# Patient Record
Sex: FEMALE | Race: WHITE | Employment: OTHER | URBAN - METROPOLITAN AREA
[De-identification: names, ages, dates, MRNs, and addresses within clinical notes are randomized per-mention and may not be internally consistent; named-entity substitution may affect disease eponyms.]

---

## 2018-05-30 ENCOUNTER — OFFICE VISIT (OUTPATIENT)
Dept: SURGERY | Facility: CLINIC | Age: 73
End: 2018-05-30

## 2018-05-30 VITALS
BODY MASS INDEX: 21.34 KG/M2 | HEIGHT: 64.25 IN | WEIGHT: 125 LBS | DIASTOLIC BLOOD PRESSURE: 83 MMHG | TEMPERATURE: 99 F | HEART RATE: 80 BPM | SYSTOLIC BLOOD PRESSURE: 156 MMHG

## 2018-05-30 DIAGNOSIS — K40.90 RIGHT INGUINAL HERNIA: Primary | ICD-10-CM

## 2018-05-30 PROCEDURE — 99204 OFFICE O/P NEW MOD 45 MIN: CPT | Performed by: SURGERY

## 2018-05-30 RX ORDER — CHLORAL HYDRATE 500 MG
1000 CAPSULE ORAL
COMMUNITY

## 2018-05-30 NOTE — PROGRESS NOTES
Follow Up Visit Note       Active Problems      No diagnosis found. Chief Complaint   No chief complaint on file.   Patient presents as consultation from Dr. Jairo Magallanes      History of Present Illness  Patient presents with right inguinal hernia which is bee bleeding, blood in stool, constipation, diarrhea, nausea and vomiting. Genitourinary: Negative for difficulty urinating, dysuria, frequency and urgency. Musculoskeletal: Negative for arthralgias and myalgias. Skin: Negative for color change and rash.

## 2018-07-25 ENCOUNTER — OFFICE VISIT (OUTPATIENT)
Dept: SURGERY | Facility: CLINIC | Age: 73
End: 2018-07-25
Payer: MEDICARE

## 2018-07-25 VITALS
SYSTOLIC BLOOD PRESSURE: 135 MMHG | HEART RATE: 97 BPM | BODY MASS INDEX: 19.16 KG/M2 | WEIGHT: 115 LBS | DIASTOLIC BLOOD PRESSURE: 81 MMHG | HEIGHT: 65 IN

## 2018-07-25 DIAGNOSIS — C19 COLORECTAL CANCER, STAGE IV (HCC): Primary | ICD-10-CM

## 2018-07-25 PROCEDURE — 99214 OFFICE O/P EST MOD 30 MIN: CPT | Performed by: SURGERY

## 2018-07-25 NOTE — PROGRESS NOTES
Follow Up Visit Note       Active Problems      No diagnosis found. Chief Complaint   Patient presents with:  Cancer: Estblished patient, new diagnosis, Colon cancer.  brought paperwork. 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 Disp:  Rfl:    omega-3 fatty acids 1000 MG Oral Cap by Does not apply route. Disp:  Rfl:         Review of Systems  The Review of Systems has been reviewed by me during today.   Review of Systems   Constitutional: Negat Normal skin turgor. No dominant lesions noted. Lymphatic. No cervical, supraclavicular, or inguinal lymphadenopathy.               Assessment   Colorectal adenocarcinoma stage IV with metastasis to the liver no obstructive symptoms other than frequent b

## 2018-07-31 ENCOUNTER — OFFICE VISIT (OUTPATIENT)
Dept: HEMATOLOGY/ONCOLOGY | Facility: HOSPITAL | Age: 73
End: 2018-07-31
Attending: INTERNAL MEDICINE
Payer: MEDICARE

## 2018-07-31 VITALS
HEIGHT: 65 IN | TEMPERATURE: 99 F | WEIGHT: 111 LBS | RESPIRATION RATE: 18 BRPM | BODY MASS INDEX: 18.49 KG/M2 | OXYGEN SATURATION: 100 % | HEART RATE: 98 BPM | DIASTOLIC BLOOD PRESSURE: 73 MMHG | SYSTOLIC BLOOD PRESSURE: 120 MMHG

## 2018-07-31 DIAGNOSIS — Z19.2 MALIGNANT NEOPLASM WITH HORMONE RESISTANT STATUS: ICD-10-CM

## 2018-07-31 DIAGNOSIS — C20 RECTAL CANCER (HCC): Primary | ICD-10-CM

## 2018-07-31 DIAGNOSIS — C78.7 SECONDARY CARCINOMA OF LIVER (HCC): ICD-10-CM

## 2018-07-31 PROBLEM — D50.9 IRON DEFICIENCY ANEMIA: Status: ACTIVE | Noted: 2018-07-31

## 2018-07-31 LAB
ALBUMIN SERPL-MCNC: 2.2 G/DL (ref 3.5–4.8)
ALBUMIN/GLOB SERPL: 0.4 {RATIO} (ref 1–2)
ALP LIVER SERPL-CCNC: 368 U/L (ref 55–142)
ALT SERPL-CCNC: 33 U/L (ref 14–54)
ANION GAP SERPL CALC-SCNC: 9 MMOL/L (ref 0–18)
AST SERPL-CCNC: 67 U/L (ref 15–41)
BASOPHILS # BLD AUTO: 0.03 X10(3) UL (ref 0–0.1)
BASOPHILS NFR BLD AUTO: 0.3 %
BILIRUB SERPL-MCNC: 0.2 MG/DL (ref 0.1–2)
BUN BLD-MCNC: 14 MG/DL (ref 8–20)
BUN/CREAT SERPL: 25 (ref 10–20)
CALCIUM BLD-MCNC: 8.9 MG/DL (ref 8.3–10.3)
CEA SERPL-MCNC: 85.5 NG/ML (ref 0.5–5)
CHLORIDE SERPL-SCNC: 98 MMOL/L (ref 101–111)
CO2 SERPL-SCNC: 28 MMOL/L (ref 22–32)
CREAT BLD-MCNC: 0.56 MG/DL (ref 0.55–1.02)
EOSINOPHIL # BLD AUTO: 0.11 X10(3) UL (ref 0–0.3)
EOSINOPHIL NFR BLD AUTO: 1.2 %
ERYTHROCYTE [DISTWIDTH] IN BLOOD BY AUTOMATED COUNT: 23.2 % (ref 11.5–16)
GLOBULIN PLAS-MCNC: 5.9 G/DL (ref 2.5–3.7)
GLUCOSE BLD-MCNC: 95 MG/DL (ref 70–99)
HCT VFR BLD AUTO: 29.9 % (ref 34–50)
HGB BLD-MCNC: 8.7 G/DL (ref 12–16)
IMMATURE GRANULOCYTE COUNT: 0.03 X10(3) UL (ref 0–1)
IMMATURE GRANULOCYTE RATIO %: 0.3 %
INR BLD: 1.05 (ref 0.9–1.1)
LARGE PLATELETS: PRESENT
LYMPHOCYTES # BLD AUTO: 1.11 X10(3) UL (ref 0.9–4)
LYMPHOCYTES NFR BLD AUTO: 11.8 %
M PROTEIN MFR SERPL ELPH: 8.1 G/DL (ref 6.1–8.3)
MCH RBC QN AUTO: 20.3 PG (ref 27–33.2)
MCHC RBC AUTO-ENTMCNC: 29.1 G/DL (ref 31–37)
MCV RBC AUTO: 69.7 FL (ref 81–100)
MONOCYTES # BLD AUTO: 1 X10(3) UL (ref 0.1–1)
MONOCYTES NFR BLD AUTO: 10.6 %
NEUTROPHIL ABS PRELIM: 7.11 X10 (3) UL (ref 1.3–6.7)
NEUTROPHILS # BLD AUTO: 7.11 X10(3) UL (ref 1.3–6.7)
NEUTROPHILS NFR BLD AUTO: 75.8 %
OSMOLALITY SERPL CALC.SUM OF ELEC: 280 MOSM/KG (ref 275–295)
PLATELET # BLD AUTO: 562 10(3)UL (ref 150–450)
POTASSIUM SERPL-SCNC: 4.3 MMOL/L (ref 3.6–5.1)
PSA SERPL DL<=0.01 NG/ML-MCNC: 14.1 SECONDS (ref 12.4–14.7)
RBC # BLD AUTO: 4.29 X10(6)UL (ref 3.8–5.1)
RED CELL DISTRIBUTION WIDTH-SD: 54.5 FL (ref 35.1–46.3)
SODIUM SERPL-SCNC: 135 MMOL/L (ref 136–144)
WBC # BLD AUTO: 9.4 X10(3) UL (ref 4–13)

## 2018-07-31 PROCEDURE — 99205 OFFICE O/P NEW HI 60 MIN: CPT | Performed by: INTERNAL MEDICINE

## 2018-07-31 NOTE — PROGRESS NOTES
Patient is here for MD consult for colon cancer. Patient was vacationing in [place] with family. Originally from there. Saw a family physician to discuss frequent stools after eating. She was sent for labs, an ultrasound and Ct scan.  Patient was admitted t

## 2018-08-01 ENCOUNTER — TELEPHONE (OUTPATIENT)
Dept: HEMATOLOGY/ONCOLOGY | Facility: HOSPITAL | Age: 73
End: 2018-08-01

## 2018-08-01 PROBLEM — C78.7 SECONDARY CARCINOMA OF LIVER (HCC): Status: ACTIVE | Noted: 2018-08-01

## 2018-08-01 NOTE — PROGRESS NOTES
Patient scheduled for chemo education in [facility] on 8/7 @ 1 pm, FOLFOX/Avastin 8/9 @ 10 am in [facility]. IR called for port placement to be scheduled the week of chemo. Patient will get a dose of Feraheme tomorrow in [facility].

## 2018-08-01 NOTE — TELEPHONE ENCOUNTER
Patient was informed that hgb was 8.7 and is better and to continue iron infusion per MD Hernandez.  Patient verbalizes understanding

## 2018-08-02 ENCOUNTER — OFFICE VISIT (OUTPATIENT)
Dept: HEMATOLOGY/ONCOLOGY | Age: 73
End: 2018-08-02
Attending: INTERNAL MEDICINE
Payer: MEDICARE

## 2018-08-02 VITALS
RESPIRATION RATE: 16 BRPM | HEART RATE: 93 BPM | SYSTOLIC BLOOD PRESSURE: 121 MMHG | TEMPERATURE: 101 F | OXYGEN SATURATION: 98 % | DIASTOLIC BLOOD PRESSURE: 68 MMHG

## 2018-08-02 DIAGNOSIS — D50.9 IRON DEFICIENCY ANEMIA, UNSPECIFIED IRON DEFICIENCY ANEMIA TYPE: Primary | ICD-10-CM

## 2018-08-02 DIAGNOSIS — C20 RECTAL CANCER (HCC): ICD-10-CM

## 2018-08-02 PROCEDURE — 96376 TX/PRO/DX INJ SAME DRUG ADON: CPT

## 2018-08-02 PROCEDURE — 96365 THER/PROPH/DIAG IV INF INIT: CPT

## 2018-08-02 PROCEDURE — 96375 TX/PRO/DX INJ NEW DRUG ADDON: CPT

## 2018-08-02 RX ORDER — METHYLPREDNISOLONE SODIUM SUCCINATE 125 MG/2ML
40 INJECTION, POWDER, LYOPHILIZED, FOR SOLUTION INTRAMUSCULAR; INTRAVENOUS ONCE
Status: COMPLETED | OUTPATIENT
Start: 2018-08-02 | End: 2018-08-02

## 2018-08-02 RX ADMIN — METHYLPREDNISOLONE SODIUM SUCCINATE 40 MG: 125 INJECTION, POWDER, LYOPHILIZED, FOR SOLUTION INTRAMUSCULAR; INTRAVENOUS at 13:58:00

## 2018-08-02 NOTE — PATIENT INSTRUCTIONS
Ferumoxytol injection  Brand Name: Shady Magallanes  What is this medicine? FERUMOXYTOL is an iron complex. Iron is used to make healthy red blood cells, which carry oxygen and nutrients throughout the body.  This medicine is used to treat iron deficiency anemia What should I watch for while using this medicine? Visit your doctor or healthcare professional regularly. Tell your doctor or healthcare professional if your symptoms do not start to get better or if they get worse.  You may need blood work done while you

## 2018-08-02 NOTE — PROGRESS NOTES
After receiving about 45ml of 120ml dose of faraheme, patient felt stabbing pain to back. She felt short of breath and like she was going to faint. Faraheme stopped. Placed on 2 liters of oxygen and began infusing normal saline.  Gloria Armendariz APN in to treatme

## 2018-08-02 NOTE — CONSULTS
[facility]    PATIENT'S NAME: Erik Gold   CONSULTING PHYSICIAN: Kavya Ryan M.D.    PATIENT ACCOUNT #: [de-identified] LOCATION: [address] [place] [address] RECORD #: [id number] YOB: 1945   CONSULTATION DATE: 07/31/2018       CANCER CE thickening of the distal rectum but they could not define the presence of the mass. Her CT of the chest showed no clearcut evidence of lung metastases. She had labs drawn there, which included a high CRP and a hemoglobin was 7.9.   She received an iron in glasses. She has no cough, asthma. She has mild allergies. She has no symptoms of GERD. She denies any urinary tract infections now; she has had them in the past.  She denies any renal calculi. She has no thyroid or endocrine disorders.   She has no ar condition. I explained that in general, unless the person has an isolated metastasis, we do not consider stage IV disease as curable and our primary goal of treatment should be extension of life and maintenance of quality of life.   At present, she does no choice of therapy in the meantime. Her questions were answered. An hour was spent with the patient and her daughter reviewing the above.     Dictated By Nida Avila M.D.  d: 08/02/2018 07:07:48  t: 08/02/2018 07:34:42  Job [id number]  NB/

## 2018-08-03 ENCOUNTER — DIETICIAN VISIT (OUTPATIENT)
Dept: HEMATOLOGY/ONCOLOGY | Facility: HOSPITAL | Age: 73
End: 2018-08-03

## 2018-08-03 ENCOUNTER — MED REC SCAN ONLY (OUTPATIENT)
Dept: HEMATOLOGY/ONCOLOGY | Facility: HOSPITAL | Age: 73
End: 2018-08-03

## 2018-08-07 ENCOUNTER — OFFICE VISIT (OUTPATIENT)
Dept: HEMATOLOGY/ONCOLOGY | Age: 73
End: 2018-08-07
Attending: INTERNAL MEDICINE
Payer: MEDICARE

## 2018-08-07 DIAGNOSIS — Z71.9 ENCOUNTER FOR HEALTH EDUCATION: ICD-10-CM

## 2018-08-07 DIAGNOSIS — C20 RECTAL CANCER (HCC): Primary | ICD-10-CM

## 2018-08-07 DIAGNOSIS — C78.7 SECONDARY CARCINOMA OF LIVER (HCC): ICD-10-CM

## 2018-08-07 PROCEDURE — 99215 OFFICE O/P EST HI 40 MIN: CPT | Performed by: CLINICAL NURSE SPECIALIST

## 2018-08-07 NOTE — PROGRESS NOTES
Chemotherapy Education    Learner:  Patient and boyfriend     Barriers / Limitations:  None    Chemotherapy education goals:  · Learn the drug names  · Administration schedule  · Routes of administration  · Treatment setting    Drug names:  Fluorouracil, L irregularity and vaginal dryness:  Achieved    Notify MD/RN of any changes or problems:  Achieved    Comments:    Treatment Effects on Emotional Status:    Potential mood changes, depression, nervousness, difficulty sleeping:  Achieved    Importance of sup

## 2018-08-08 ENCOUNTER — HOSPITAL ENCOUNTER (OUTPATIENT)
Dept: INTERVENTIONAL RADIOLOGY/VASCULAR | Facility: HOSPITAL | Age: 73
Discharge: HOME | End: 2018-08-08
Attending: INTERNAL MEDICINE | Admitting: INTERNAL MEDICINE
Payer: MEDICARE

## 2018-08-08 VITALS
SYSTOLIC BLOOD PRESSURE: 122 MMHG | HEART RATE: 81 BPM | DIASTOLIC BLOOD PRESSURE: 68 MMHG | OXYGEN SATURATION: 97 % | RESPIRATION RATE: 22 BRPM | TEMPERATURE: 98 F

## 2018-08-08 DIAGNOSIS — C20 RECTAL CANCER (HCC): ICD-10-CM

## 2018-08-08 LAB — INR: 1 (ref 0.8–1.3)

## 2018-08-08 PROCEDURE — 99152 MOD SED SAME PHYS/QHP 5/>YRS: CPT

## 2018-08-08 PROCEDURE — 0JH60WZ INSERTION OF TOTALLY IMPLANTABLE VASCULAR ACCESS DEVICE INTO CHEST SUBCUTANEOUS TISSUE AND FASCIA, OPEN APPROACH: ICD-10-PCS | Performed by: RADIOLOGY

## 2018-08-08 PROCEDURE — 02HV33Z INSERTION OF INFUSION DEVICE INTO SUPERIOR VENA CAVA, PERCUTANEOUS APPROACH: ICD-10-PCS | Performed by: RADIOLOGY

## 2018-08-08 PROCEDURE — 76937 US GUIDE VASCULAR ACCESS: CPT

## 2018-08-08 PROCEDURE — 77001 FLUOROGUIDE FOR VEIN DEVICE: CPT

## 2018-08-08 PROCEDURE — 85610 PROTHROMBIN TIME: CPT

## 2018-08-08 PROCEDURE — 36561 INSERT TUNNELED CV CATH: CPT

## 2018-08-08 RX ORDER — BACITRACIN 50000 [USP'U]/1
INJECTION, POWDER, LYOPHILIZED, FOR SOLUTION INTRAMUSCULAR
Status: COMPLETED
Start: 2018-08-08 | End: 2018-08-08

## 2018-08-08 RX ORDER — HEPARIN SODIUM 5000 [USP'U]/ML
INJECTION, SOLUTION INTRAVENOUS; SUBCUTANEOUS
Status: COMPLETED
Start: 2018-08-08 | End: 2018-08-08

## 2018-08-08 RX ORDER — LIDOCAINE HYDROCHLORIDE 10 MG/ML
INJECTION, SOLUTION INFILTRATION; PERINEURAL
Status: COMPLETED
Start: 2018-08-08 | End: 2018-08-08

## 2018-08-08 RX ORDER — SODIUM CHLORIDE 9 MG/ML
INJECTION, SOLUTION INTRAVENOUS CONTINUOUS
Status: DISCONTINUED | OUTPATIENT
Start: 2018-08-08 | End: 2018-08-08

## 2018-08-08 RX ORDER — PROCHLORPERAZINE MALEATE 10 MG
10 TABLET ORAL EVERY 6 HOURS PRN
Qty: 30 TABLET | Refills: 3 | Status: SHIPPED | OUTPATIENT
Start: 2018-08-08 | End: 2019-02-28

## 2018-08-08 RX ORDER — MIDAZOLAM HYDROCHLORIDE 1 MG/ML
INJECTION INTRAMUSCULAR; INTRAVENOUS
Status: COMPLETED
Start: 2018-08-08 | End: 2018-08-08

## 2018-08-08 RX ORDER — ONDANSETRON HYDROCHLORIDE 8 MG/1
8 TABLET, FILM COATED ORAL EVERY 8 HOURS PRN
Qty: 30 TABLET | Refills: 3 | Status: SHIPPED | OUTPATIENT
Start: 2018-08-08 | End: 2019-02-28

## 2018-08-08 RX ORDER — CEFAZOLIN SODIUM 1 G/3ML
INJECTION, POWDER, FOR SOLUTION INTRAMUSCULAR; INTRAVENOUS
Status: COMPLETED
Start: 2018-08-08 | End: 2018-08-08

## 2018-08-08 RX ORDER — LIDOCAINE HYDROCHLORIDE AND EPINEPHRINE 15; 5 MG/ML; UG/ML
INJECTION, SOLUTION EPIDURAL
Status: COMPLETED
Start: 2018-08-08 | End: 2018-08-08

## 2018-08-08 RX ADMIN — SODIUM CHLORIDE: 9 INJECTION, SOLUTION INTRAVENOUS at 10:30:00

## 2018-08-08 NOTE — PROGRESS NOTES
Rc'd pt from cath lab in stable condition. VSS. Dressings to right neck are soft, clean and dry. No bleeding or hematoma. Pt denies c/o pain or discomfort. Pt sleepy, easily arousable. 14:30: Pt fully awake.  Reviewed d/c instructions with pt and daught

## 2018-08-09 ENCOUNTER — OFFICE VISIT (OUTPATIENT)
Dept: HEMATOLOGY/ONCOLOGY | Age: 73
End: 2018-08-09
Attending: INTERNAL MEDICINE
Payer: MEDICARE

## 2018-08-09 ENCOUNTER — NURSE ONLY (OUTPATIENT)
Dept: HEMATOLOGY/ONCOLOGY | Age: 73
End: 2018-08-09
Attending: INTERNAL MEDICINE
Payer: MEDICARE

## 2018-08-09 VITALS
OXYGEN SATURATION: 98 % | HEART RATE: 61 BPM | BODY MASS INDEX: 18 KG/M2 | RESPIRATION RATE: 16 BRPM | WEIGHT: 111 LBS | SYSTOLIC BLOOD PRESSURE: 124 MMHG | DIASTOLIC BLOOD PRESSURE: 72 MMHG | TEMPERATURE: 99 F

## 2018-08-09 DIAGNOSIS — C20 RECTAL CANCER (HCC): ICD-10-CM

## 2018-08-09 DIAGNOSIS — C78.7 SECONDARY CARCINOMA OF LIVER (HCC): Primary | ICD-10-CM

## 2018-08-09 LAB
ALBUMIN SERPL-MCNC: 2 G/DL (ref 3.5–4.8)
ALBUMIN/GLOB SERPL: 0.4 {RATIO} (ref 1–2)
ALP LIVER SERPL-CCNC: 318 U/L (ref 55–142)
ALT SERPL-CCNC: 30 U/L (ref 14–54)
ANION GAP SERPL CALC-SCNC: 10 MMOL/L (ref 0–18)
AST SERPL-CCNC: 82 U/L (ref 15–41)
BASOPHILS # BLD AUTO: 0.03 X10(3) UL (ref 0–0.1)
BASOPHILS NFR BLD AUTO: 0.3 %
BILIRUB SERPL-MCNC: 0.5 MG/DL (ref 0.1–2)
BUN BLD-MCNC: 14 MG/DL (ref 8–20)
BUN/CREAT SERPL: 18.2 (ref 10–20)
CALCIUM BLD-MCNC: 8.7 MG/DL (ref 8.3–10.3)
CEA SERPL-MCNC: 86.5 NG/ML (ref 0.5–5)
CHLORIDE SERPL-SCNC: 98 MMOL/L (ref 101–111)
CO2 SERPL-SCNC: 24 MMOL/L (ref 22–32)
CREAT BLD-MCNC: 0.77 MG/DL (ref 0.55–1.02)
EOSINOPHIL # BLD AUTO: 0.05 X10(3) UL (ref 0–0.3)
EOSINOPHIL NFR BLD AUTO: 0.4 %
ERYTHROCYTE [DISTWIDTH] IN BLOOD BY AUTOMATED COUNT: 25.1 % (ref 11.5–16)
GLOBULIN PLAS-MCNC: 5.6 G/DL (ref 2.5–3.7)
GLUCOSE BLD-MCNC: 185 MG/DL (ref 70–99)
HCT VFR BLD AUTO: 30.5 % (ref 34–50)
HGB BLD-MCNC: 8.9 G/DL (ref 12–16)
IMMATURE GRANULOCYTE COUNT: 0.04 X10(3) UL (ref 0–1)
IMMATURE GRANULOCYTE RATIO %: 0.4 %
LYMPHOCYTES # BLD AUTO: 0.91 X10(3) UL (ref 0.9–4)
LYMPHOCYTES NFR BLD AUTO: 8.1 %
M PROTEIN MFR SERPL ELPH: 7.6 G/DL (ref 6.1–8.3)
MCH RBC QN AUTO: 20.7 PG (ref 27–33.2)
MCHC RBC AUTO-ENTMCNC: 29.2 G/DL (ref 31–37)
MCV RBC AUTO: 71.1 FL (ref 81–100)
MONOCYTES # BLD AUTO: 1.17 X10(3) UL (ref 0.1–1)
MONOCYTES NFR BLD AUTO: 10.4 %
NEUTROPHIL ABS PRELIM: 9.02 X10 (3) UL (ref 1.3–6.7)
NEUTROPHILS # BLD AUTO: 9.02 X10(3) UL (ref 1.3–6.7)
NEUTROPHILS NFR BLD AUTO: 80.4 %
OSMOLALITY SERPL CALC.SUM OF ELEC: 279 MOSM/KG (ref 275–295)
PLATELET # BLD AUTO: 401 10(3)UL (ref 150–450)
PLATELET MORPHOLOGY: NORMAL
POTASSIUM SERPL-SCNC: 3.7 MMOL/L (ref 3.6–5.1)
RBC # BLD AUTO: 4.29 X10(6)UL (ref 3.8–5.1)
RED CELL DISTRIBUTION WIDTH-SD: 62.4 FL (ref 35.1–46.3)
ROULEAUX: PRESENT
SODIUM SERPL-SCNC: 132 MMOL/L (ref 136–144)
WBC # BLD AUTO: 11.2 X10(3) UL (ref 4–13)

## 2018-08-09 PROCEDURE — 96411 CHEMO IV PUSH ADDL DRUG: CPT

## 2018-08-09 PROCEDURE — 96413 CHEMO IV INFUSION 1 HR: CPT

## 2018-08-09 PROCEDURE — 96417 CHEMO IV INFUS EACH ADDL SEQ: CPT

## 2018-08-09 PROCEDURE — 96368 THER/DIAG CONCURRENT INF: CPT

## 2018-08-09 PROCEDURE — 99214 OFFICE O/P EST MOD 30 MIN: CPT | Performed by: INTERNAL MEDICINE

## 2018-08-09 PROCEDURE — 85025 COMPLETE CBC W/AUTO DIFF WBC: CPT

## 2018-08-09 PROCEDURE — 96416 CHEMO PROLONG INFUSE W/PUMP: CPT

## 2018-08-09 PROCEDURE — 82378 CARCINOEMBRYONIC ANTIGEN: CPT

## 2018-08-09 PROCEDURE — 80053 COMPREHEN METABOLIC PANEL: CPT

## 2018-08-09 PROCEDURE — 96415 CHEMO IV INFUSION ADDL HR: CPT

## 2018-08-09 PROCEDURE — 96375 TX/PRO/DX INJ NEW DRUG ADDON: CPT

## 2018-08-09 RX ORDER — MULTIVIT-MIN/IRON FUM/FOLIC AC 7.5 MG-4
1 TABLET ORAL DAILY
COMMUNITY
End: 2019-01-01

## 2018-08-09 RX ORDER — METOCLOPRAMIDE HYDROCHLORIDE 5 MG/ML
10 INJECTION INTRAMUSCULAR; INTRAVENOUS EVERY 6 HOURS PRN
Status: CANCELLED | OUTPATIENT
Start: 2018-08-09

## 2018-08-09 RX ORDER — LORAZEPAM 2 MG/ML
INJECTION INTRAMUSCULAR EVERY 4 HOURS PRN
Status: CANCELLED | OUTPATIENT
Start: 2018-08-09

## 2018-08-09 RX ORDER — FLUOROURACIL 50 MG/ML
2400 INJECTION, SOLUTION INTRAVENOUS CONTINUOUS
Status: CANCELLED | OUTPATIENT
Start: 2018-08-09

## 2018-08-09 RX ORDER — PROCHLORPERAZINE MALEATE 10 MG
10 TABLET ORAL EVERY 6 HOURS PRN
Status: CANCELLED | OUTPATIENT
Start: 2018-08-09

## 2018-08-09 RX ORDER — FLUOROURACIL 50 MG/ML
400 INJECTION, SOLUTION INTRAVENOUS ONCE
Status: COMPLETED | OUTPATIENT
Start: 2018-08-09 | End: 2018-08-09

## 2018-08-09 RX ORDER — FLUOROURACIL 50 MG/ML
2400 INJECTION, SOLUTION INTRAVENOUS CONTINUOUS
Status: DISCONTINUED | OUTPATIENT
Start: 2018-08-09 | End: 2018-08-09

## 2018-08-09 RX ORDER — LORAZEPAM 0.5 MG/1
TABLET ORAL EVERY 4 HOURS PRN
Status: CANCELLED | OUTPATIENT
Start: 2018-08-09

## 2018-08-09 RX ORDER — ONDANSETRON 2 MG/ML
8 INJECTION INTRAMUSCULAR; INTRAVENOUS EVERY 6 HOURS PRN
Status: CANCELLED | OUTPATIENT
Start: 2018-08-09

## 2018-08-09 RX ORDER — FLUOROURACIL 50 MG/ML
400 INJECTION, SOLUTION INTRAVENOUS ONCE
Status: CANCELLED | OUTPATIENT
Start: 2018-08-09

## 2018-08-09 RX ADMIN — FLUOROURACIL 3700 MG: 50 INJECTION, SOLUTION INTRAVENOUS at 15:18:00

## 2018-08-09 RX ADMIN — FLUOROURACIL 600 MG: 50 INJECTION, SOLUTION INTRAVENOUS at 15:12:00

## 2018-08-09 NOTE — PATIENT INSTRUCTIONS
To reach Dr Teresa Ferreira nurse during business hours, please call [phone]. After hours, including weekends, evenings, and holidays, please call the main number [phone] for emergent needs.

## 2018-08-09 NOTE — PROGRESS NOTES
Nutrition Consultation    Patient Name: Johnathan Lima  YOB: 1945  Medical Record Number: [id number]   Account Number: [de-identified]  Dietitian: Tarah Youssef RD    Date of visit: 8/9/2018    Diet Rx: high protein, calorie as tolerated    P Britton who was nominated for [employer]" for her work in oncology; this particular physician touts the benefits of a vegetarian diet w/ additional protein-like substance.  Pt states she's trying to follow for \"6 weeks\" to see if her testing im

## 2018-08-10 ENCOUNTER — TELEPHONE (OUTPATIENT)
Dept: HEMATOLOGY/ONCOLOGY | Facility: HOSPITAL | Age: 73
End: 2018-08-10

## 2018-08-10 NOTE — PROGRESS NOTES
[facility]    PATIENT'S NAME: Lizette Bull   ATTENDING PHYSICIAN: Effie Blanton M.D.    PATIENT ACCOUNT #: [de-identified] LOCATION: [address] [place] [address] RECORD #: [id number] YOB: 1945   DATE OF SERVICE: 08/09/2018       CANCER TAHIR when we jovanni it previously. She has been able to maintain her weight. She states that she is having a little bit of discomfort in her lower abdomen occasionally. She is eating a relatively high-fiber diet. She has no fevers or chills.   She does have a stop.  I cautioned her against taking too many extra supplements while on chemotherapy due to uncertain interaction between these agents and the chemotherapy. She is unlikely to experience major hair loss. I will see her again in 2 weeks.   She knows to c

## 2018-08-11 ENCOUNTER — NURSE ONLY (OUTPATIENT)
Dept: HEMATOLOGY/ONCOLOGY | Facility: HOSPITAL | Age: 73
End: 2018-08-11
Attending: INTERNAL MEDICINE
Payer: MEDICARE

## 2018-08-11 PROCEDURE — 96523 IRRIG DRUG DELIVERY DEVICE: CPT

## 2018-08-13 ENCOUNTER — TELEPHONE (OUTPATIENT)
Dept: HEMATOLOGY/ONCOLOGY | Facility: HOSPITAL | Age: 73
End: 2018-08-13

## 2018-08-13 NOTE — TELEPHONE ENCOUNTER
Date of Treatment: 8/9/18                               Type of Chemo: FOLFOX/AVASTIN    Comments: LM for patient on listed cell number. Recommendations: general message for patient to call with any questions or concerns after treatment.  Verified next a

## 2018-08-15 NOTE — PROGRESS NOTES
[facility]  Progress Note      Gianfranco Dean Patient Status:  Outpatient in a Bed    1945 MRN [id number]   [facility] No att[facility] providers found   [facility] Day # 0 PCP Roann Schirmer     Patient came to I

## 2018-08-16 ENCOUNTER — OFFICE VISIT (OUTPATIENT)
Dept: HEMATOLOGY/ONCOLOGY | Age: 73
End: 2018-08-16
Attending: CLINICAL NURSE SPECIALIST
Payer: MEDICARE

## 2018-08-16 VITALS
DIASTOLIC BLOOD PRESSURE: 78 MMHG | BODY MASS INDEX: 19 KG/M2 | SYSTOLIC BLOOD PRESSURE: 122 MMHG | HEART RATE: 93 BPM | OXYGEN SATURATION: 100 % | TEMPERATURE: 96 F | WEIGHT: 108 LBS | RESPIRATION RATE: 16 BRPM

## 2018-08-16 DIAGNOSIS — C20 RECTAL CANCER (HCC): ICD-10-CM

## 2018-08-16 DIAGNOSIS — D50.9 IRON DEFICIENCY ANEMIA, UNSPECIFIED IRON DEFICIENCY ANEMIA TYPE: ICD-10-CM

## 2018-08-16 DIAGNOSIS — Z51.11 ENCOUNTER FOR CHEMOTHERAPY MANAGEMENT: ICD-10-CM

## 2018-08-16 DIAGNOSIS — K13.79 MOUTH SORE SECONDARY TO CHEMOTHERAPY: ICD-10-CM

## 2018-08-16 DIAGNOSIS — C78.7 SECONDARY CARCINOMA OF LIVER (HCC): Primary | ICD-10-CM

## 2018-08-16 DIAGNOSIS — T45.1X5A MOUTH SORE SECONDARY TO CHEMOTHERAPY: ICD-10-CM

## 2018-08-16 DIAGNOSIS — R53.0 NEOPLASTIC MALIGNANT RELATED FATIGUE: ICD-10-CM

## 2018-08-16 DIAGNOSIS — R63.4 WEIGHT LOSS: ICD-10-CM

## 2018-08-16 PROCEDURE — 99214 OFFICE O/P EST MOD 30 MIN: CPT | Performed by: CLINICAL NURSE SPECIALIST

## 2018-08-16 NOTE — PROGRESS NOTES
ANP Visit Note    Patient Name: Roberto Minaya   YOB: 1945   Medical Record Number: [id number]   CSN: [id number]   Date of visit: 8/16/2018       Chief Complaint/Reason for Visit:  Metastatic Rectal Cancer, Cycle 1 Day 8 evaluation    Oncolog Years of education: N/A  Number of children: N/A     Occupational History  None on file     Social History Main Topics   Smoking status: Former Smoker  0.50 Packs/day  For 18.00 Years     Quit date: 8/1/1970    Smokeless tobacco: Never Used    Alcohol use Grossly intact. Lymphatics: There is no palpable lymphadenopathy throughout in the cervical,supraclavicular, axillary, or inguinal regions. Psych/Depression: mood and affect are appropriate. Impression/Plan:  1.  Metastatic Rectal Cancer - liver mets

## 2018-08-17 PROCEDURE — 81210 BRAF GENE: CPT | Performed by: INTERNAL MEDICINE

## 2018-08-17 PROCEDURE — 81404 MOPATH PROCEDURE LEVEL 5: CPT | Performed by: INTERNAL MEDICINE

## 2018-08-17 PROCEDURE — 81301 MICROSATELLITE INSTABILITY: CPT | Performed by: INTERNAL MEDICINE

## 2018-08-17 PROCEDURE — 81276 KRAS GENE ADDL VARIANTS: CPT | Performed by: INTERNAL MEDICINE

## 2018-08-17 PROCEDURE — 88305 TISSUE EXAM BY PATHOLOGIST: CPT | Performed by: INTERNAL MEDICINE

## 2018-08-17 PROCEDURE — 81275 KRAS GENE VARIANTS EXON 2: CPT | Performed by: INTERNAL MEDICINE

## 2018-08-17 PROCEDURE — 88381 MICRODISSECTION MANUAL: CPT | Performed by: INTERNAL MEDICINE

## 2018-08-17 PROCEDURE — 88360 TUMOR IMMUNOHISTOCHEM/MANUAL: CPT | Performed by: INTERNAL MEDICINE

## 2018-08-17 PROCEDURE — 81311 NRAS GENE VARIANTS EXON 2&3: CPT | Performed by: INTERNAL MEDICINE

## 2018-08-18 ENCOUNTER — LAB REQUISITION (OUTPATIENT)
Dept: LAB | Facility: HOSPITAL | Age: 73
End: 2018-08-18
Payer: MEDICARE

## 2018-08-18 DIAGNOSIS — Z85.048 PERSONAL HISTORY OF OTHER MALIGNANT NEOPLASM OF RECTUM, RECTOSIGMOID JUNCTION, AND ANUS: ICD-10-CM

## 2018-08-21 ENCOUNTER — NURSE NAVIGATOR ENCOUNTER (OUTPATIENT)
Dept: HEMATOLOGY/ONCOLOGY | Facility: HOSPITAL | Age: 73
End: 2018-08-21

## 2018-08-23 ENCOUNTER — OFFICE VISIT (OUTPATIENT)
Dept: HEMATOLOGY/ONCOLOGY | Age: 73
End: 2018-08-23
Attending: INTERNAL MEDICINE
Payer: MEDICARE

## 2018-08-23 ENCOUNTER — NURSE ONLY (OUTPATIENT)
Dept: HEMATOLOGY/ONCOLOGY | Age: 73
End: 2018-08-23
Attending: INTERNAL MEDICINE
Payer: MEDICARE

## 2018-08-23 VITALS
RESPIRATION RATE: 16 BRPM | HEART RATE: 76 BPM | SYSTOLIC BLOOD PRESSURE: 151 MMHG | WEIGHT: 112.13 LBS | OXYGEN SATURATION: 100 % | TEMPERATURE: 99 F | BODY MASS INDEX: 19 KG/M2 | DIASTOLIC BLOOD PRESSURE: 90 MMHG

## 2018-08-23 DIAGNOSIS — C20 RECTAL CANCER (HCC): Primary | ICD-10-CM

## 2018-08-23 DIAGNOSIS — C78.7 SECONDARY CARCINOMA OF LIVER (HCC): Primary | ICD-10-CM

## 2018-08-23 DIAGNOSIS — C20 RECTAL CANCER (HCC): ICD-10-CM

## 2018-08-23 DIAGNOSIS — C78.7 SECONDARY CARCINOMA OF LIVER (HCC): ICD-10-CM

## 2018-08-23 DIAGNOSIS — D50.9 IRON DEFICIENCY ANEMIA, UNSPECIFIED IRON DEFICIENCY ANEMIA TYPE: ICD-10-CM

## 2018-08-23 LAB
ALBUMIN SERPL-MCNC: 2.3 G/DL (ref 3.5–4.8)
ALBUMIN/GLOB SERPL: 0.5 {RATIO} (ref 1–2)
ALP LIVER SERPL-CCNC: 254 U/L (ref 55–142)
ALT SERPL-CCNC: 29 U/L (ref 14–54)
ANION GAP SERPL CALC-SCNC: 9 MMOL/L (ref 0–18)
AST SERPL-CCNC: 42 U/L (ref 15–41)
BASOPHILS # BLD AUTO: 0.03 X10(3) UL (ref 0–0.1)
BASOPHILS NFR BLD AUTO: 0.5 %
BILIRUB SERPL-MCNC: 0.2 MG/DL (ref 0.1–2)
BUN BLD-MCNC: 11 MG/DL (ref 8–20)
BUN/CREAT SERPL: 18.6 (ref 10–20)
CALCIUM BLD-MCNC: 8.9 MG/DL (ref 8.3–10.3)
CEA SERPL-MCNC: 107.7 NG/ML (ref 0.5–5)
CHLORIDE SERPL-SCNC: 103 MMOL/L (ref 101–111)
CO2 SERPL-SCNC: 26 MMOL/L (ref 22–32)
CONTROL RUN WITHIN 24 HOURS?: YES
CREAT BLD-MCNC: 0.59 MG/DL (ref 0.55–1.02)
EOSINOPHIL # BLD AUTO: 0.12 X10(3) UL (ref 0–0.3)
EOSINOPHIL NFR BLD AUTO: 2 %
ERYTHROCYTE [DISTWIDTH] IN BLOOD BY AUTOMATED COUNT: 27.6 % (ref 11.5–16)
GLOBULIN PLAS-MCNC: 4.9 G/DL (ref 2.5–4)
GLUCOSE BLD-MCNC: 104 MG/DL (ref 70–99)
GLUCOSE URINE: NEGATIVE
HCT VFR BLD AUTO: 34.2 % (ref 34–50)
HGB BLD-MCNC: 9.9 G/DL (ref 12–16)
IMMATURE GRANULOCYTE COUNT: 0.02 X10(3) UL (ref 0–1)
IMMATURE GRANULOCYTE RATIO %: 0.3 %
LEUKOCYTE ESTERASE URINE: NEGATIVE
LYMPHOCYTES # BLD AUTO: 1.04 X10(3) UL (ref 0.9–4)
LYMPHOCYTES NFR BLD AUTO: 17.2 %
M PROTEIN MFR SERPL ELPH: 7.2 G/DL (ref 6.1–8.3)
MCH RBC QN AUTO: 21.9 PG (ref 27–33.2)
MCHC RBC AUTO-ENTMCNC: 28.9 G/DL (ref 31–37)
MCV RBC AUTO: 75.7 FL (ref 81–100)
MONOCYTES # BLD AUTO: 0.69 X10(3) UL (ref 0.1–1)
MONOCYTES NFR BLD AUTO: 11.4 %
NEUTROPHIL ABS PRELIM: 4.13 X10 (3) UL (ref 1.3–6.7)
NEUTROPHILS # BLD AUTO: 4.13 X10(3) UL (ref 1.3–6.7)
NEUTROPHILS NFR BLD AUTO: 68.6 %
NITRITE URINE: NEGATIVE
OSMOLALITY SERPL CALC.SUM OF ELEC: 286 MOSM/KG (ref 275–295)
PLATELET # BLD AUTO: 345 10(3)UL (ref 150–450)
PLATELET MORPHOLOGY: NORMAL
POTASSIUM SERPL-SCNC: 4.3 MMOL/L (ref 3.6–5.1)
PROTEIN URINE: NEGATIVE
RBC # BLD AUTO: 4.52 X10(6)UL (ref 3.8–5.1)
RED CELL DISTRIBUTION WIDTH-SD: 72.6 FL (ref 35.1–46.3)
SODIUM SERPL-SCNC: 138 MMOL/L (ref 136–144)
WBC # BLD AUTO: 6 X10(3) UL (ref 4–13)

## 2018-08-23 PROCEDURE — 85025 COMPLETE CBC W/AUTO DIFF WBC: CPT

## 2018-08-23 PROCEDURE — 96417 CHEMO IV INFUS EACH ADDL SEQ: CPT

## 2018-08-23 PROCEDURE — 82378 CARCINOEMBRYONIC ANTIGEN: CPT

## 2018-08-23 PROCEDURE — 96413 CHEMO IV INFUSION 1 HR: CPT

## 2018-08-23 PROCEDURE — 96368 THER/DIAG CONCURRENT INF: CPT

## 2018-08-23 PROCEDURE — 96375 TX/PRO/DX INJ NEW DRUG ADDON: CPT

## 2018-08-23 PROCEDURE — 80053 COMPREHEN METABOLIC PANEL: CPT

## 2018-08-23 PROCEDURE — 96415 CHEMO IV INFUSION ADDL HR: CPT

## 2018-08-23 PROCEDURE — 96411 CHEMO IV PUSH ADDL DRUG: CPT

## 2018-08-23 PROCEDURE — 99214 OFFICE O/P EST MOD 30 MIN: CPT | Performed by: INTERNAL MEDICINE

## 2018-08-23 RX ORDER — FLUOROURACIL 50 MG/ML
400 INJECTION, SOLUTION INTRAVENOUS ONCE
Status: CANCELLED | OUTPATIENT
Start: 2018-08-23

## 2018-08-23 RX ORDER — LORAZEPAM 2 MG/ML
INJECTION INTRAMUSCULAR EVERY 4 HOURS PRN
Status: CANCELLED | OUTPATIENT
Start: 2018-08-23

## 2018-08-23 RX ORDER — ONDANSETRON 2 MG/ML
8 INJECTION INTRAMUSCULAR; INTRAVENOUS EVERY 6 HOURS PRN
Status: CANCELLED | OUTPATIENT
Start: 2018-08-23

## 2018-08-23 RX ORDER — LORAZEPAM 0.5 MG/1
TABLET ORAL EVERY 4 HOURS PRN
Status: CANCELLED | OUTPATIENT
Start: 2018-08-23

## 2018-08-23 RX ORDER — METOCLOPRAMIDE HYDROCHLORIDE 5 MG/ML
10 INJECTION INTRAMUSCULAR; INTRAVENOUS EVERY 6 HOURS PRN
Status: CANCELLED | OUTPATIENT
Start: 2018-08-23

## 2018-08-23 RX ORDER — FLUOROURACIL 50 MG/ML
400 INJECTION, SOLUTION INTRAVENOUS ONCE
Status: COMPLETED | OUTPATIENT
Start: 2018-08-23 | End: 2018-08-23

## 2018-08-23 RX ORDER — FLUOROURACIL 50 MG/ML
2400 INJECTION, SOLUTION INTRAVENOUS CONTINUOUS
Status: DISCONTINUED | OUTPATIENT
Start: 2018-08-23 | End: 2018-08-23

## 2018-08-23 RX ORDER — PROCHLORPERAZINE MALEATE 10 MG
10 TABLET ORAL EVERY 6 HOURS PRN
Status: CANCELLED | OUTPATIENT
Start: 2018-08-23

## 2018-08-23 RX ORDER — FLUOROURACIL 50 MG/ML
2400 INJECTION, SOLUTION INTRAVENOUS CONTINUOUS
Status: CANCELLED | OUTPATIENT
Start: 2018-08-23

## 2018-08-23 RX ADMIN — FLUOROURACIL 600 MG: 50 INJECTION, SOLUTION INTRAVENOUS at 14:42:00

## 2018-08-23 RX ADMIN — FLUOROURACIL 3700 MG: 50 INJECTION, SOLUTION INTRAVENOUS at 14:34:00

## 2018-08-23 NOTE — PROGRESS NOTES
Pt here for C2D1.   Arrives Ambulating independently, accompanied by Other self           Modifications in dose or schedule: No     Frequency of blood return and site check throughout administration: Prior to administration and Every 2-3 ml IVP   Discharged

## 2018-08-25 ENCOUNTER — NURSE ONLY (OUTPATIENT)
Dept: HEMATOLOGY/ONCOLOGY | Facility: HOSPITAL | Age: 73
End: 2018-08-25
Attending: INTERNAL MEDICINE
Payer: MEDICARE

## 2018-08-25 PROCEDURE — 96523 IRRIG DRUG DELIVERY DEVICE: CPT

## 2018-08-27 NOTE — PROGRESS NOTES
Nutrition Consultation     Patient Name: Shaan Zazueta  YOB: 1945  Medical Record Number: [id number]      Account Number: [de-identified]  Dietitian: Lay Ayon RD     Date of visit: 8/23/2018     Diet Rx: high protein, calorie as tolerated

## 2018-08-28 NOTE — PROGRESS NOTES
[facility]    PATIENT'S NAME: Makayla Boss   ATTENDING PHYSICIAN: Akhil Ball M.D.    PATIENT ACCOUNT #: [de-identified] LOCATION: [address] [place] [address] RECORD #: [id number] YOB: 1945   DATE OF SERVICE: 08/23/2018       CANCER CENT on.    PHYSICAL EXAMINATION:    GENERAL:  She is well-developed, well-nourished female in no acute distress. VITAL SIGNS:  Her performance status is 0. Her weight is 112 pounds, which is up.   Blood pressure is 151/90, pulse 76, respiratory rate is 20, te

## 2018-08-29 LAB
BRAF CRC: NOT DETECTED
KRAS CRC: NOT DETECTED
NRAS CRC: NOT DETECTED
PIK3CA CRC: NOT DETECTED

## 2018-09-06 ENCOUNTER — OFFICE VISIT (OUTPATIENT)
Dept: HEMATOLOGY/ONCOLOGY | Age: 73
End: 2018-09-06
Attending: INTERNAL MEDICINE
Payer: MEDICARE

## 2018-09-06 VITALS
BODY MASS INDEX: 20 KG/M2 | TEMPERATURE: 98 F | DIASTOLIC BLOOD PRESSURE: 88 MMHG | WEIGHT: 114.5 LBS | RESPIRATION RATE: 16 BRPM | HEART RATE: 62 BPM | SYSTOLIC BLOOD PRESSURE: 160 MMHG | OXYGEN SATURATION: 100 %

## 2018-09-06 DIAGNOSIS — C20 RECTAL CANCER (HCC): Primary | ICD-10-CM

## 2018-09-06 DIAGNOSIS — D50.9 IRON DEFICIENCY ANEMIA, UNSPECIFIED IRON DEFICIENCY ANEMIA TYPE: ICD-10-CM

## 2018-09-06 DIAGNOSIS — C20 RECTAL CANCER (HCC): ICD-10-CM

## 2018-09-06 DIAGNOSIS — C78.7 SECONDARY CARCINOMA OF LIVER (HCC): ICD-10-CM

## 2018-09-06 DIAGNOSIS — C78.7 SECONDARY CARCINOMA OF LIVER (HCC): Primary | ICD-10-CM

## 2018-09-06 LAB
ALBUMIN SERPL-MCNC: 2.6 G/DL (ref 3.5–4.8)
ALBUMIN/GLOB SERPL: 0.6 {RATIO} (ref 1–2)
ALP LIVER SERPL-CCNC: 199 U/L (ref 55–142)
ALT SERPL-CCNC: 31 U/L (ref 14–54)
ANION GAP SERPL CALC-SCNC: 6 MMOL/L (ref 0–18)
AST SERPL-CCNC: 38 U/L (ref 15–41)
BASOPHILS # BLD AUTO: 0.02 X10(3) UL (ref 0–0.1)
BASOPHILS NFR BLD AUTO: 0.4 %
BILIRUB SERPL-MCNC: 0.2 MG/DL (ref 0.1–2)
BUN BLD-MCNC: 11 MG/DL (ref 8–20)
BUN/CREAT SERPL: 20.4 (ref 10–20)
CALCIUM BLD-MCNC: 9 MG/DL (ref 8.3–10.3)
CEA SERPL-MCNC: 122 NG/ML (ref 0.5–5)
CHLORIDE SERPL-SCNC: 103 MMOL/L (ref 101–111)
CO2 SERPL-SCNC: 27 MMOL/L (ref 22–32)
CREAT BLD-MCNC: 0.54 MG/DL (ref 0.55–1.02)
EOSINOPHIL # BLD AUTO: 0.1 X10(3) UL (ref 0–0.3)
EOSINOPHIL NFR BLD AUTO: 2.2 %
ERYTHROCYTE [DISTWIDTH] IN BLOOD BY AUTOMATED COUNT: 29.4 % (ref 11.5–16)
GLOBULIN PLAS-MCNC: 4.3 G/DL (ref 2.5–4)
GLUCOSE BLD-MCNC: 95 MG/DL (ref 70–99)
HCT VFR BLD AUTO: 35.7 % (ref 34–50)
HGB BLD-MCNC: 10.6 G/DL (ref 12–16)
IMMATURE GRANULOCYTE COUNT: 0.01 X10(3) UL (ref 0–1)
IMMATURE GRANULOCYTE RATIO %: 0.2 %
LYMPHOCYTES # BLD AUTO: 1.05 X10(3) UL (ref 0.9–4)
LYMPHOCYTES NFR BLD AUTO: 23.4 %
M PROTEIN MFR SERPL ELPH: 6.9 G/DL (ref 6.1–8.3)
MCH RBC QN AUTO: 23.1 PG (ref 27–33.2)
MCHC RBC AUTO-ENTMCNC: 29.7 G/DL (ref 31–37)
MCV RBC AUTO: 77.9 FL (ref 81–100)
MONOCYTES # BLD AUTO: 0.75 X10(3) UL (ref 0.1–1)
MONOCYTES NFR BLD AUTO: 16.7 %
NEUTROPHIL ABS PRELIM: 2.56 X10 (3) UL (ref 1.3–6.7)
NEUTROPHILS # BLD AUTO: 2.56 X10(3) UL (ref 1.3–6.7)
NEUTROPHILS NFR BLD AUTO: 57.1 %
OSMOLALITY SERPL CALC.SUM OF ELEC: 281 MOSM/KG (ref 275–295)
PLATELET # BLD AUTO: 184 10(3)UL (ref 150–450)
PLATELET MORPHOLOGY: NORMAL
POTASSIUM SERPL-SCNC: 4.1 MMOL/L (ref 3.6–5.1)
RBC # BLD AUTO: 4.58 X10(6)UL (ref 3.8–5.1)
RED CELL DISTRIBUTION WIDTH-SD: 80.5 FL (ref 35.1–46.3)
SODIUM SERPL-SCNC: 136 MMOL/L (ref 136–144)
WBC # BLD AUTO: 4.5 X10(3) UL (ref 4–13)

## 2018-09-06 PROCEDURE — 96415 CHEMO IV INFUSION ADDL HR: CPT

## 2018-09-06 PROCEDURE — 96411 CHEMO IV PUSH ADDL DRUG: CPT

## 2018-09-06 PROCEDURE — 82378 CARCINOEMBRYONIC ANTIGEN: CPT

## 2018-09-06 PROCEDURE — 80053 COMPREHEN METABOLIC PANEL: CPT

## 2018-09-06 PROCEDURE — 99214 OFFICE O/P EST MOD 30 MIN: CPT | Performed by: INTERNAL MEDICINE

## 2018-09-06 PROCEDURE — 85025 COMPLETE CBC W/AUTO DIFF WBC: CPT

## 2018-09-06 PROCEDURE — 96413 CHEMO IV INFUSION 1 HR: CPT

## 2018-09-06 PROCEDURE — 96375 TX/PRO/DX INJ NEW DRUG ADDON: CPT

## 2018-09-06 PROCEDURE — 96368 THER/DIAG CONCURRENT INF: CPT

## 2018-09-06 PROCEDURE — 96417 CHEMO IV INFUS EACH ADDL SEQ: CPT

## 2018-09-06 RX ORDER — PROCHLORPERAZINE MALEATE 10 MG
10 TABLET ORAL EVERY 6 HOURS PRN
Status: CANCELLED | OUTPATIENT
Start: 2018-09-06

## 2018-09-06 RX ORDER — LORAZEPAM 0.5 MG/1
TABLET ORAL EVERY 4 HOURS PRN
Status: CANCELLED | OUTPATIENT
Start: 2018-09-06

## 2018-09-06 RX ORDER — ONDANSETRON 2 MG/ML
8 INJECTION INTRAMUSCULAR; INTRAVENOUS EVERY 6 HOURS PRN
Status: CANCELLED | OUTPATIENT
Start: 2018-09-06

## 2018-09-06 RX ORDER — FLUOROURACIL 50 MG/ML
400 INJECTION, SOLUTION INTRAVENOUS ONCE
Status: CANCELLED | OUTPATIENT
Start: 2018-09-06

## 2018-09-06 RX ORDER — METOCLOPRAMIDE HYDROCHLORIDE 5 MG/ML
10 INJECTION INTRAMUSCULAR; INTRAVENOUS EVERY 6 HOURS PRN
Status: CANCELLED | OUTPATIENT
Start: 2018-09-06

## 2018-09-06 RX ORDER — FLUOROURACIL 50 MG/ML
2400 INJECTION, SOLUTION INTRAVENOUS CONTINUOUS
Status: DISCONTINUED | OUTPATIENT
Start: 2018-09-06 | End: 2018-09-06

## 2018-09-06 RX ORDER — FLUOROURACIL 50 MG/ML
400 INJECTION, SOLUTION INTRAVENOUS ONCE
Status: COMPLETED | OUTPATIENT
Start: 2018-09-06 | End: 2018-09-06

## 2018-09-06 RX ORDER — LORAZEPAM 2 MG/ML
INJECTION INTRAMUSCULAR EVERY 4 HOURS PRN
Status: CANCELLED | OUTPATIENT
Start: 2018-09-06

## 2018-09-06 RX ORDER — FLUOROURACIL 50 MG/ML
2400 INJECTION, SOLUTION INTRAVENOUS CONTINUOUS
Status: CANCELLED | OUTPATIENT
Start: 2018-09-06

## 2018-09-06 RX ADMIN — FLUOROURACIL 3700 MG: 50 INJECTION, SOLUTION INTRAVENOUS at 14:05:00

## 2018-09-06 RX ADMIN — FLUOROURACIL 600 MG: 50 INJECTION, SOLUTION INTRAVENOUS at 14:00:00

## 2018-09-06 NOTE — PROGRESS NOTES
Pt here for C3D1.   Arrives Ambulating independently, accompanied by Other self           Modifications in dose or schedule: No     Frequency of blood return and site check throughout administration: Prior to administration, Prior to each drug and Every 2-3

## 2018-09-06 NOTE — PROGRESS NOTES
Patient is here for MD f/u and cycle 3 of chemo. Patient reports she has a lot of energy and feels great. Appetite is good. Pt did have cold sensitivity in fingertips and it lasted 5 days. No other complaints.

## 2018-09-07 RX ORDER — SODIUM CHLORIDE 0.9 % (FLUSH) 0.9 %
10 SYRINGE (ML) INJECTION ONCE
Status: CANCELLED | OUTPATIENT
Start: 2018-09-07

## 2018-09-07 RX ORDER — SODIUM CHLORIDE 0.9 % (FLUSH) 0.9 %
10 SYRINGE (ML) INJECTION ONCE
Status: DISCONTINUED | OUTPATIENT
Start: 2018-09-08 | End: 2018-09-08

## 2018-09-08 ENCOUNTER — NURSE ONLY (OUTPATIENT)
Dept: HEMATOLOGY/ONCOLOGY | Facility: HOSPITAL | Age: 73
End: 2018-09-08
Attending: INTERNAL MEDICINE
Payer: MEDICARE

## 2018-09-08 DIAGNOSIS — C20 RECTAL CANCER (HCC): Primary | ICD-10-CM

## 2018-09-08 DIAGNOSIS — D50.9 IRON DEFICIENCY ANEMIA, UNSPECIFIED IRON DEFICIENCY ANEMIA TYPE: ICD-10-CM

## 2018-09-08 PROCEDURE — 96523 IRRIG DRUG DELIVERY DEVICE: CPT

## 2018-09-10 PROBLEM — I15.9 SECONDARY HYPERTENSION: Status: ACTIVE | Noted: 2018-09-10

## 2018-09-11 NOTE — PROGRESS NOTES
[facility]    PATIENT'S NAME: Gonzalo Costello   ATTENDING PHYSICIAN: Richard Aburto M.D.    PATIENT ACCOUNT #: [de-identified] LOCATION: [address] [place] [address] RECORD #: [id number] YOB: 1945   DATE OF SERVICE: 09/06/2018       CANCER [facility] well-developed, thin female in no acute distress. VITAL SIGNS:  Her performance status is 0. Her weight is 114 pounds which is up 2 more pounds. Blood pressure is 160/88, pulse 62, respiratory rate is 20, temperature is 98. 3. HEENT:  Unremarkable.   She

## 2018-09-20 ENCOUNTER — OFFICE VISIT (OUTPATIENT)
Dept: HEMATOLOGY/ONCOLOGY | Age: 73
End: 2018-09-20
Attending: INTERNAL MEDICINE
Payer: MEDICARE

## 2018-09-20 ENCOUNTER — NURSE ONLY (OUTPATIENT)
Dept: HEMATOLOGY/ONCOLOGY | Age: 73
End: 2018-09-20
Attending: INTERNAL MEDICINE
Payer: MEDICARE

## 2018-09-20 VITALS
WEIGHT: 115 LBS | OXYGEN SATURATION: 100 % | TEMPERATURE: 98 F | BODY MASS INDEX: 20 KG/M2 | SYSTOLIC BLOOD PRESSURE: 155 MMHG | HEART RATE: 64 BPM | DIASTOLIC BLOOD PRESSURE: 92 MMHG

## 2018-09-20 DIAGNOSIS — C78.7 SECONDARY CARCINOMA OF LIVER (HCC): ICD-10-CM

## 2018-09-20 DIAGNOSIS — D50.9 IRON DEFICIENCY ANEMIA, UNSPECIFIED IRON DEFICIENCY ANEMIA TYPE: ICD-10-CM

## 2018-09-20 DIAGNOSIS — C20 RECTAL CANCER (HCC): Primary | ICD-10-CM

## 2018-09-20 DIAGNOSIS — C78.7 SECONDARY CARCINOMA OF LIVER (HCC): Primary | ICD-10-CM

## 2018-09-20 DIAGNOSIS — C20 RECTAL CANCER (HCC): ICD-10-CM

## 2018-09-20 LAB
ALBUMIN SERPL-MCNC: 2.5 G/DL (ref 3.5–4.8)
ALBUMIN/GLOB SERPL: 0.6 {RATIO} (ref 1–2)
ALP LIVER SERPL-CCNC: 241 U/L (ref 55–142)
ALT SERPL-CCNC: 59 U/L (ref 14–54)
ANION GAP SERPL CALC-SCNC: 7 MMOL/L (ref 0–18)
AST SERPL-CCNC: 55 U/L (ref 15–41)
BASOPHILS # BLD AUTO: 0.01 X10(3) UL (ref 0–0.1)
BASOPHILS NFR BLD AUTO: 0.2 %
BILIRUB SERPL-MCNC: 0.4 MG/DL (ref 0.1–2)
BUN BLD-MCNC: 11 MG/DL (ref 8–20)
BUN/CREAT SERPL: 19.3 (ref 10–20)
CALCIUM BLD-MCNC: 8.8 MG/DL (ref 8.3–10.3)
CEA SERPL-MCNC: 98.7 NG/ML (ref 0.5–5)
CHLORIDE SERPL-SCNC: 103 MMOL/L (ref 101–111)
CO2 SERPL-SCNC: 27 MMOL/L (ref 22–32)
CREAT BLD-MCNC: 0.57 MG/DL (ref 0.55–1.02)
EOSINOPHIL # BLD AUTO: 0.1 X10(3) UL (ref 0–0.3)
EOSINOPHIL NFR BLD AUTO: 2.5 %
ERYTHROCYTE [DISTWIDTH] IN BLOOD BY AUTOMATED COUNT: 28.9 % (ref 11.5–16)
GLOBULIN PLAS-MCNC: 4.3 G/DL (ref 2.5–4)
GLUCOSE BLD-MCNC: 110 MG/DL (ref 70–99)
HCT VFR BLD AUTO: 38.2 % (ref 34–50)
HGB BLD-MCNC: 11.7 G/DL (ref 12–16)
IMMATURE GRANULOCYTE COUNT: 0.01 X10(3) UL (ref 0–1)
IMMATURE GRANULOCYTE RATIO %: 0.2 %
LYMPHOCYTES # BLD AUTO: 0.93 X10(3) UL (ref 0.9–4)
LYMPHOCYTES NFR BLD AUTO: 23 %
M PROTEIN MFR SERPL ELPH: 6.8 G/DL (ref 6.1–8.3)
MCH RBC QN AUTO: 24.8 PG (ref 27–33.2)
MCHC RBC AUTO-ENTMCNC: 30.6 G/DL (ref 31–37)
MCV RBC AUTO: 81.1 FL (ref 81–100)
MONOCYTES # BLD AUTO: 0.68 X10(3) UL (ref 0.1–1)
MONOCYTES NFR BLD AUTO: 16.8 %
NEUTROPHIL ABS PRELIM: 2.32 X10 (3) UL (ref 1.3–6.7)
NEUTROPHILS # BLD AUTO: 2.32 X10(3) UL (ref 1.3–6.7)
NEUTROPHILS NFR BLD AUTO: 57.3 %
OSMOLALITY SERPL CALC.SUM OF ELEC: 284 MOSM/KG (ref 275–295)
PLATELET # BLD AUTO: 110 10(3)UL (ref 150–450)
PLATELET MORPHOLOGY: NORMAL
POTASSIUM SERPL-SCNC: 4 MMOL/L (ref 3.6–5.1)
RBC # BLD AUTO: 4.71 X10(6)UL (ref 3.8–5.1)
RED CELL DISTRIBUTION WIDTH-SD: 82 FL (ref 35.1–46.3)
SODIUM SERPL-SCNC: 137 MMOL/L (ref 136–144)
WBC # BLD AUTO: 4.1 X10(3) UL (ref 4–13)

## 2018-09-20 PROCEDURE — 96367 TX/PROPH/DG ADDL SEQ IV INF: CPT

## 2018-09-20 PROCEDURE — 85025 COMPLETE CBC W/AUTO DIFF WBC: CPT

## 2018-09-20 PROCEDURE — 82378 CARCINOEMBRYONIC ANTIGEN: CPT

## 2018-09-20 PROCEDURE — 96375 TX/PRO/DX INJ NEW DRUG ADDON: CPT

## 2018-09-20 PROCEDURE — 96415 CHEMO IV INFUSION ADDL HR: CPT

## 2018-09-20 PROCEDURE — 96368 THER/DIAG CONCURRENT INF: CPT

## 2018-09-20 PROCEDURE — 96411 CHEMO IV PUSH ADDL DRUG: CPT

## 2018-09-20 PROCEDURE — 96416 CHEMO PROLONG INFUSE W/PUMP: CPT

## 2018-09-20 PROCEDURE — 96413 CHEMO IV INFUSION 1 HR: CPT

## 2018-09-20 PROCEDURE — 80053 COMPREHEN METABOLIC PANEL: CPT

## 2018-09-20 PROCEDURE — 96417 CHEMO IV INFUS EACH ADDL SEQ: CPT

## 2018-09-20 PROCEDURE — 99214 OFFICE O/P EST MOD 30 MIN: CPT | Performed by: INTERNAL MEDICINE

## 2018-09-20 RX ORDER — FLUOROURACIL 50 MG/ML
400 INJECTION, SOLUTION INTRAVENOUS ONCE
Status: CANCELLED | OUTPATIENT
Start: 2018-09-20

## 2018-09-20 RX ORDER — FLUOROURACIL 50 MG/ML
2400 INJECTION, SOLUTION INTRAVENOUS CONTINUOUS
Status: CANCELLED | OUTPATIENT
Start: 2018-09-20

## 2018-09-20 RX ORDER — METOCLOPRAMIDE HYDROCHLORIDE 5 MG/ML
10 INJECTION INTRAMUSCULAR; INTRAVENOUS EVERY 6 HOURS PRN
Status: CANCELLED | OUTPATIENT
Start: 2018-09-20

## 2018-09-20 RX ORDER — FLUOROURACIL 50 MG/ML
2400 INJECTION, SOLUTION INTRAVENOUS CONTINUOUS
Status: DISCONTINUED | OUTPATIENT
Start: 2018-09-20 | End: 2018-09-20

## 2018-09-20 RX ORDER — PROCHLORPERAZINE MALEATE 10 MG
10 TABLET ORAL EVERY 6 HOURS PRN
Status: CANCELLED | OUTPATIENT
Start: 2018-09-20

## 2018-09-20 RX ORDER — ONDANSETRON 2 MG/ML
8 INJECTION INTRAMUSCULAR; INTRAVENOUS EVERY 6 HOURS PRN
Status: CANCELLED | OUTPATIENT
Start: 2018-09-20

## 2018-09-20 RX ORDER — FLUOROURACIL 50 MG/ML
400 INJECTION, SOLUTION INTRAVENOUS ONCE
Status: COMPLETED | OUTPATIENT
Start: 2018-09-20 | End: 2018-09-20

## 2018-09-20 RX ORDER — LORAZEPAM 2 MG/ML
INJECTION INTRAMUSCULAR EVERY 4 HOURS PRN
Status: CANCELLED | OUTPATIENT
Start: 2018-09-20

## 2018-09-20 RX ORDER — LORAZEPAM 0.5 MG/1
TABLET ORAL EVERY 4 HOURS PRN
Status: CANCELLED | OUTPATIENT
Start: 2018-09-20

## 2018-09-20 RX ADMIN — FLUOROURACIL 3700 MG: 50 INJECTION, SOLUTION INTRAVENOUS at 13:30:00

## 2018-09-20 RX ADMIN — FLUOROURACIL 600 MG: 50 INJECTION, SOLUTION INTRAVENOUS at 13:35:00

## 2018-09-20 NOTE — PROGRESS NOTES
Patient is here for MD f/u and cycle 4 of FOLFOX/Avastin. Appetite is good. Patient c/o tingling in fingertips x one week. Patient had developed spasms on the left side of her mouth that lasted a few days. Intermittent fatigue but otherwise feeling well.  O

## 2018-09-20 NOTE — PATIENT INSTRUCTIONS
To reach Dr Era Jacobo nurse during business hours, please call [phone]. After hours, including weekends, evenings, and holidays, please call the main number [phone] for emergent needs.     Have a CT scan done before your next exam

## 2018-09-21 PROBLEM — D69.6 THROMBOCYTOPENIA (HCC): Status: ACTIVE | Noted: 2018-09-21

## 2018-09-21 NOTE — PROGRESS NOTES
Nutrition F/U Note     Patient Name: Justin Barroso  YOB: 1945  Medical Record Number: [id number]      Account Number: [de-identified]  Dietitian: Noemi Vargas RD     Date of visit: 09/20/2018     Diet Rx: high protein, calorie as tolerated

## 2018-09-22 ENCOUNTER — NURSE ONLY (OUTPATIENT)
Dept: HEMATOLOGY/ONCOLOGY | Facility: HOSPITAL | Age: 73
End: 2018-09-22
Attending: INTERNAL MEDICINE
Payer: MEDICARE

## 2018-09-22 PROCEDURE — 96523 IRRIG DRUG DELIVERY DEVICE: CPT

## 2018-09-24 NOTE — PROGRESS NOTES
[facility]    PATIENT'S NAME: Robert Vazquez   ATTENDING PHYSICIAN: Dara Lezama M.D.    PATIENT ACCOUNT #: [de-identified] LOCATION: [address] [place] [address] RECORD #: [id number] YOB: 1945   DATE OF SERVICE: 09/20/2018       CANCER CENT percussion and clear to auscultation. No wheezing, rales, or rhonchi. HEART:  Normal.  ABDOMEN:  No hepatosplenomegaly or tenderness. Her previously enlarged liver is much smaller and right at the right costal margin. She has no ascites.   She has no in

## 2018-10-01 ENCOUNTER — HOSPITAL ENCOUNTER (OUTPATIENT)
Dept: CT IMAGING | Age: 73
Discharge: HOME | End: 2018-10-01
Attending: INTERNAL MEDICINE
Payer: MEDICARE

## 2018-10-01 DIAGNOSIS — C20 RECTAL CANCER (HCC): ICD-10-CM

## 2018-10-01 PROCEDURE — 82565 ASSAY OF CREATININE: CPT

## 2018-10-01 PROCEDURE — 74177 CT ABD & PELVIS W/CONTRAST: CPT | Performed by: INTERNAL MEDICINE

## 2018-10-01 PROCEDURE — 71260 CT THORAX DX C+: CPT | Performed by: INTERNAL MEDICINE

## 2018-10-04 ENCOUNTER — OFFICE VISIT (OUTPATIENT)
Dept: HEMATOLOGY/ONCOLOGY | Age: 73
End: 2018-10-04
Attending: INTERNAL MEDICINE
Payer: MEDICARE

## 2018-10-04 ENCOUNTER — NURSE ONLY (OUTPATIENT)
Dept: HEMATOLOGY/ONCOLOGY | Age: 73
End: 2018-10-04
Attending: INTERNAL MEDICINE
Payer: MEDICARE

## 2018-10-04 VITALS
HEART RATE: 67 BPM | RESPIRATION RATE: 16 BRPM | OXYGEN SATURATION: 100 % | BODY MASS INDEX: 20 KG/M2 | WEIGHT: 116.5 LBS | DIASTOLIC BLOOD PRESSURE: 89 MMHG | SYSTOLIC BLOOD PRESSURE: 165 MMHG | TEMPERATURE: 98 F

## 2018-10-04 DIAGNOSIS — C20 RECTAL CANCER (HCC): Primary | ICD-10-CM

## 2018-10-04 DIAGNOSIS — C78.7 SECONDARY CARCINOMA OF LIVER (HCC): Primary | ICD-10-CM

## 2018-10-04 DIAGNOSIS — C20 RECTAL CANCER (HCC): ICD-10-CM

## 2018-10-04 DIAGNOSIS — C78.7 SECONDARY CARCINOMA OF LIVER (HCC): ICD-10-CM

## 2018-10-04 DIAGNOSIS — I15.9 SECONDARY HYPERTENSION: ICD-10-CM

## 2018-10-04 DIAGNOSIS — D50.9 IRON DEFICIENCY ANEMIA, UNSPECIFIED IRON DEFICIENCY ANEMIA TYPE: ICD-10-CM

## 2018-10-04 PROCEDURE — 80053 COMPREHEN METABOLIC PANEL: CPT

## 2018-10-04 PROCEDURE — 96368 THER/DIAG CONCURRENT INF: CPT

## 2018-10-04 PROCEDURE — 96416 CHEMO PROLONG INFUSE W/PUMP: CPT

## 2018-10-04 PROCEDURE — 99214 OFFICE O/P EST MOD 30 MIN: CPT | Performed by: INTERNAL MEDICINE

## 2018-10-04 PROCEDURE — 96375 TX/PRO/DX INJ NEW DRUG ADDON: CPT

## 2018-10-04 PROCEDURE — 96417 CHEMO IV INFUS EACH ADDL SEQ: CPT

## 2018-10-04 PROCEDURE — 82378 CARCINOEMBRYONIC ANTIGEN: CPT

## 2018-10-04 PROCEDURE — 96415 CHEMO IV INFUSION ADDL HR: CPT

## 2018-10-04 PROCEDURE — 85025 COMPLETE CBC W/AUTO DIFF WBC: CPT

## 2018-10-04 PROCEDURE — 96411 CHEMO IV PUSH ADDL DRUG: CPT

## 2018-10-04 PROCEDURE — 96413 CHEMO IV INFUSION 1 HR: CPT

## 2018-10-04 RX ORDER — METOCLOPRAMIDE HYDROCHLORIDE 5 MG/ML
10 INJECTION INTRAMUSCULAR; INTRAVENOUS EVERY 6 HOURS PRN
Status: CANCELLED | OUTPATIENT
Start: 2018-10-04

## 2018-10-04 RX ORDER — FLUOROURACIL 50 MG/ML
2400 INJECTION, SOLUTION INTRAVENOUS CONTINUOUS
Status: DISCONTINUED | OUTPATIENT
Start: 2018-10-04 | End: 2018-10-04

## 2018-10-04 RX ORDER — FLUOROURACIL 50 MG/ML
400 INJECTION, SOLUTION INTRAVENOUS ONCE
Status: COMPLETED | OUTPATIENT
Start: 2018-10-04 | End: 2018-10-04

## 2018-10-04 RX ORDER — FLUOROURACIL 50 MG/ML
400 INJECTION, SOLUTION INTRAVENOUS ONCE
Status: CANCELLED | OUTPATIENT
Start: 2018-10-04

## 2018-10-04 RX ORDER — LORAZEPAM 2 MG/ML
INJECTION INTRAMUSCULAR EVERY 4 HOURS PRN
Status: CANCELLED | OUTPATIENT
Start: 2018-10-04

## 2018-10-04 RX ORDER — LORAZEPAM 0.5 MG/1
TABLET ORAL EVERY 4 HOURS PRN
Status: CANCELLED | OUTPATIENT
Start: 2018-10-04

## 2018-10-04 RX ORDER — ONDANSETRON 2 MG/ML
8 INJECTION INTRAMUSCULAR; INTRAVENOUS EVERY 6 HOURS PRN
Status: CANCELLED | OUTPATIENT
Start: 2018-10-04

## 2018-10-04 RX ORDER — PROCHLORPERAZINE MALEATE 10 MG
10 TABLET ORAL EVERY 6 HOURS PRN
Status: CANCELLED | OUTPATIENT
Start: 2018-10-04

## 2018-10-04 RX ORDER — FLUOROURACIL 50 MG/ML
2400 INJECTION, SOLUTION INTRAVENOUS CONTINUOUS
Status: CANCELLED | OUTPATIENT
Start: 2018-10-04

## 2018-10-04 RX ADMIN — FLUOROURACIL 3700 MG: 50 INJECTION, SOLUTION INTRAVENOUS at 14:46:00

## 2018-10-04 RX ADMIN — FLUOROURACIL 600 MG: 50 INJECTION, SOLUTION INTRAVENOUS at 14:51:00

## 2018-10-04 NOTE — PROGRESS NOTES
Pt here for C5D1.   Arrives Ambulating independently, accompanied by            Modifications in dose or schedule: No     Frequency of blood return and site check throughout administration: Prior to administration   Discharged to Home, Ambulating independen

## 2018-10-04 NOTE — PROGRESS NOTES
Patient is here for MD f/u and cycle 5 of chemo. C/o stomach cramping and pain in abdominal hernia. Patient c/o hearing loss and needs bilateral hearing aids. Intermittent nose bleeding in the mornings which last only a few minutes.  Mild fatigue for a few

## 2018-10-04 NOTE — PROGRESS NOTES
Nutrition F/U Note     Patient Name: Shannon Reyes  YOB: 1945  Medical Record Number: [id number]      Account Number: [de-identified]  Dietitian: Candace Regalado RD     Date of visit: 10/04/2018     Diet Rx: high protein, calorie as tolerated

## 2018-10-06 ENCOUNTER — NURSE ONLY (OUTPATIENT)
Dept: HEMATOLOGY/ONCOLOGY | Facility: HOSPITAL | Age: 73
End: 2018-10-06
Attending: INTERNAL MEDICINE
Payer: MEDICARE

## 2018-10-06 DIAGNOSIS — D50.9 IRON DEFICIENCY ANEMIA, UNSPECIFIED IRON DEFICIENCY ANEMIA TYPE: ICD-10-CM

## 2018-10-06 DIAGNOSIS — C20 RECTAL CANCER (HCC): Primary | ICD-10-CM

## 2018-10-06 PROCEDURE — 96523 IRRIG DRUG DELIVERY DEVICE: CPT

## 2018-10-06 RX ORDER — SODIUM CHLORIDE 0.9 % (FLUSH) 0.9 %
10 SYRINGE (ML) INJECTION ONCE
Status: COMPLETED | OUTPATIENT
Start: 2018-10-06 | End: 2018-10-06

## 2018-10-06 RX ORDER — SODIUM CHLORIDE 0.9 % (FLUSH) 0.9 %
10 SYRINGE (ML) INJECTION ONCE
Status: CANCELLED | OUTPATIENT
Start: 2018-10-06

## 2018-10-06 RX ADMIN — SODIUM CHLORIDE 0.9 % (FLUSH) 10 ML: 0.9 % SYRINGE (ML) INJECTION at 10:38:00

## 2018-10-06 NOTE — PROGRESS NOTES
Education Record    Learner:  Patient    Disease / Diagnosis: pump d/c    Barriers / Limitations:  None   Comments:    Method:  Brief focused   Comments:    General Topics:  Medication   Comments:    Outcome:  Shows understanding   Comments:

## 2018-10-08 NOTE — PROGRESS NOTES
[facility]    PATIENT'S NAME: Mela Jean Baptiste   ATTENDING PHYSICIAN: Damaris Dan M.D.    PATIENT ACCOUNT #: [de-identified] LOCATION: [address] [place] [address] RECORD #: [id number] YOB: 1945   DATE OF SERVICE: 10/04/2018       CANCER CENT clubbing, cyanosis, or edema. NEUROLOGIC:  She is intact. LABORATORY DATA:  Her CEA continues to drop. It is down to 72. Her white count is 3.9, hemoglobin 12.2, platelets 60,854. Thrombocytopenia is almost certainly due to the oxaliplatin.   Her clark

## 2018-10-18 ENCOUNTER — OFFICE VISIT (OUTPATIENT)
Dept: HEMATOLOGY/ONCOLOGY | Age: 73
End: 2018-10-18
Attending: INTERNAL MEDICINE
Payer: MEDICARE

## 2018-10-18 ENCOUNTER — HOSPITAL ENCOUNTER (OUTPATIENT)
Dept: MRI IMAGING | Facility: HOSPITAL | Age: 73
Discharge: HOME | End: 2018-10-18
Attending: CLINICAL NURSE SPECIALIST
Payer: MEDICARE

## 2018-10-18 VITALS
WEIGHT: 114 LBS | BODY MASS INDEX: 20 KG/M2 | SYSTOLIC BLOOD PRESSURE: 166 MMHG | RESPIRATION RATE: 16 BRPM | DIASTOLIC BLOOD PRESSURE: 83 MMHG | TEMPERATURE: 99 F | HEART RATE: 71 BPM | OXYGEN SATURATION: 100 %

## 2018-10-18 DIAGNOSIS — T45.1X5A CHEMOTHERAPY-INDUCED NAUSEA: ICD-10-CM

## 2018-10-18 DIAGNOSIS — R47.89 OTHER SPEECH DISTURBANCE: ICD-10-CM

## 2018-10-18 DIAGNOSIS — C78.7 SECONDARY CARCINOMA OF LIVER (HCC): ICD-10-CM

## 2018-10-18 DIAGNOSIS — C20 RECTAL CANCER (HCC): ICD-10-CM

## 2018-10-18 DIAGNOSIS — D72.820: ICD-10-CM

## 2018-10-18 DIAGNOSIS — I15.9 SECONDARY HYPERTENSION: ICD-10-CM

## 2018-10-18 DIAGNOSIS — R11.0 CHEMOTHERAPY-INDUCED NAUSEA: ICD-10-CM

## 2018-10-18 DIAGNOSIS — D50.9 IRON DEFICIENCY ANEMIA, UNSPECIFIED IRON DEFICIENCY ANEMIA TYPE: ICD-10-CM

## 2018-10-18 DIAGNOSIS — C20 RECTAL CANCER (HCC): Primary | ICD-10-CM

## 2018-10-18 DIAGNOSIS — R47.89 OTHER SPEECH DISTURBANCE: Primary | ICD-10-CM

## 2018-10-18 DIAGNOSIS — R29.818: ICD-10-CM

## 2018-10-18 DIAGNOSIS — K52.1 CHEMOTHERAPY INDUCED DIARRHEA: ICD-10-CM

## 2018-10-18 DIAGNOSIS — G62.0 NEUROPATHY DUE TO CHEMOTHERAPEUTIC DRUG (HCC): ICD-10-CM

## 2018-10-18 DIAGNOSIS — Z51.11 ENCOUNTER FOR CHEMOTHERAPY MANAGEMENT: ICD-10-CM

## 2018-10-18 DIAGNOSIS — R13.0 APHAGIA: ICD-10-CM

## 2018-10-18 DIAGNOSIS — R51.9: ICD-10-CM

## 2018-10-18 DIAGNOSIS — T45.1X5A NEUROPATHY DUE TO CHEMOTHERAPEUTIC DRUG (HCC): ICD-10-CM

## 2018-10-18 DIAGNOSIS — T45.1X5A CHEMOTHERAPY INDUCED DIARRHEA: ICD-10-CM

## 2018-10-18 PROCEDURE — 36593 DECLOT VASCULAR DEVICE: CPT

## 2018-10-18 PROCEDURE — A9575 INJ GADOTERATE MEGLUMI 0.1ML: HCPCS

## 2018-10-18 PROCEDURE — 82378 CARCINOEMBRYONIC ANTIGEN: CPT

## 2018-10-18 PROCEDURE — 85025 COMPLETE CBC W/AUTO DIFF WBC: CPT

## 2018-10-18 PROCEDURE — 70553 MRI BRAIN STEM W/O & W/DYE: CPT | Performed by: CLINICAL NURSE SPECIALIST

## 2018-10-18 PROCEDURE — 36591 DRAW BLOOD OFF VENOUS DEVICE: CPT

## 2018-10-18 PROCEDURE — 80053 COMPREHEN METABOLIC PANEL: CPT

## 2018-10-18 PROCEDURE — 99215 OFFICE O/P EST HI 40 MIN: CPT | Performed by: CLINICAL NURSE SPECIALIST

## 2018-10-18 RX ORDER — METOCLOPRAMIDE HYDROCHLORIDE 5 MG/ML
10 INJECTION INTRAMUSCULAR; INTRAVENOUS EVERY 6 HOURS PRN
Status: CANCELLED | OUTPATIENT
Start: 2018-10-18

## 2018-10-18 RX ORDER — LORAZEPAM 2 MG/ML
INJECTION INTRAMUSCULAR EVERY 4 HOURS PRN
Status: CANCELLED | OUTPATIENT
Start: 2018-10-18

## 2018-10-18 RX ORDER — PROCHLORPERAZINE MALEATE 10 MG
10 TABLET ORAL EVERY 6 HOURS PRN
Status: CANCELLED | OUTPATIENT
Start: 2018-10-18

## 2018-10-18 RX ORDER — LORAZEPAM 0.5 MG/1
TABLET ORAL EVERY 4 HOURS PRN
Status: CANCELLED | OUTPATIENT
Start: 2018-10-18

## 2018-10-18 RX ORDER — FLUOROURACIL 50 MG/ML
2400 INJECTION, SOLUTION INTRAVENOUS CONTINUOUS
Status: CANCELLED | OUTPATIENT
Start: 2018-10-18

## 2018-10-18 RX ORDER — ONDANSETRON 2 MG/ML
8 INJECTION INTRAMUSCULAR; INTRAVENOUS EVERY 6 HOURS PRN
Status: CANCELLED | OUTPATIENT
Start: 2018-10-18

## 2018-10-18 RX ORDER — SODIUM CHLORIDE 0.9 % (FLUSH) 0.9 %
10 SYRINGE (ML) INJECTION ONCE
Status: CANCELLED | OUTPATIENT
Start: 2018-10-18

## 2018-10-18 RX ORDER — FLUOROURACIL 50 MG/ML
400 INJECTION, SOLUTION INTRAVENOUS ONCE
Status: CANCELLED | OUTPATIENT
Start: 2018-10-18

## 2018-10-18 NOTE — PROGRESS NOTES
Patient is here for APN assessment and cycle 6 of chemo. Patient had a severe headache the day after last cycle. B/p has been elevated at home. This only lasted one day. Mild cold sensitivity in fingers even up until last night. Appetite is fair.  Rabia Plant

## 2018-10-18 NOTE — PROGRESS NOTES
ANP Visit Note    Patient Name: America Musa   YOB: 1945   Medical Record Number: [id number]   CSN: [id number]   Date of visit: 10/18/2018       Chief Complaint/Reason for Visit:  Rectal Cancer with mets to liver, on chemotherapy      [facility]isto ([facility]     Secondary hypertension     Thrombocytopenia ([facility]       Medical History:  Past Medical History:   Diagnosis Date   • Abdominal hernia    • Belching    • Cancer [facility])    • Hemorrhoids    • Night sweats    • Rectal cancer (HCC)    • Wears glasses Take 1 tablet (10 mg total) by mouth every 6 (six) hours as needed for Nausea., Disp: 30 tablet, Rfl: 3  •  Ondansetron HCl (ZOFRAN) 8 MG tablet, Take 1 tablet (8 mg total) by mouth every 8 (eight) hours as needed for Nausea., Disp: 30 tablet, Rfl: 3  •  Z - 111 mmol/L    CO2 26.0 22.0 - 32.0 mmol/L    Anion Gap 8 0 - 18 mmol/L    BUN 9 8 - 20 mg/dL    Creatinine 0.52 (L) 0.55 - 1.02 mg/dL    BUN/CREA Ratio 17.3 10.0 - 20.0    Calcium, Total 8.8 8.3 - 10.3 mg/dL    Calculated Osmolality 283 275 - 295 mOsm/kg headache that were not reported until today; now both resolved. -Will hold chemotherapy today in order to obtain MRI to evaluate symptoms above  -Cycle 6 tentatively rescheduled for tomorrow 10/19/18 pending MRI results.   If MRI negative for bleed or stro chemo    Electronically Signed by:    David Vasquez ANP-BC  Nurse Practitioner  THE [facility]

## 2018-10-18 NOTE — PROGRESS NOTES
Avastin on hold d/t concern for possible stroke, patient with no symptoms at this time. MRI ordered for this evening, so unable to connect patient to 5FU pump for the evening. Patient will return to cancer center tomorrow for chemo.

## 2018-10-19 ENCOUNTER — OFFICE VISIT (OUTPATIENT)
Dept: HEMATOLOGY/ONCOLOGY | Age: 73
End: 2018-10-19
Attending: INTERNAL MEDICINE
Payer: MEDICARE

## 2018-10-19 VITALS
TEMPERATURE: 99 F | RESPIRATION RATE: 16 BRPM | WEIGHT: 115.5 LBS | DIASTOLIC BLOOD PRESSURE: 89 MMHG | HEART RATE: 81 BPM | OXYGEN SATURATION: 100 % | BODY MASS INDEX: 20 KG/M2 | SYSTOLIC BLOOD PRESSURE: 137 MMHG

## 2018-10-19 DIAGNOSIS — C20 RECTAL CANCER (HCC): ICD-10-CM

## 2018-10-19 DIAGNOSIS — C78.7 SECONDARY CARCINOMA OF LIVER (HCC): Primary | ICD-10-CM

## 2018-10-19 PROCEDURE — 96417 CHEMO IV INFUS EACH ADDL SEQ: CPT

## 2018-10-19 PROCEDURE — 96368 THER/DIAG CONCURRENT INF: CPT

## 2018-10-19 PROCEDURE — 96411 CHEMO IV PUSH ADDL DRUG: CPT

## 2018-10-19 PROCEDURE — 96375 TX/PRO/DX INJ NEW DRUG ADDON: CPT

## 2018-10-19 PROCEDURE — 96413 CHEMO IV INFUSION 1 HR: CPT

## 2018-10-19 PROCEDURE — 96415 CHEMO IV INFUSION ADDL HR: CPT

## 2018-10-19 RX ORDER — FLUOROURACIL 50 MG/ML
2400 INJECTION, SOLUTION INTRAVENOUS CONTINUOUS
Status: DISCONTINUED | OUTPATIENT
Start: 2018-10-19 | End: 2018-10-19

## 2018-10-19 RX ORDER — FLUOROURACIL 50 MG/ML
400 INJECTION, SOLUTION INTRAVENOUS ONCE
Status: COMPLETED | OUTPATIENT
Start: 2018-10-19 | End: 2018-10-19

## 2018-10-19 RX ADMIN — FLUOROURACIL 600 MG: 50 INJECTION, SOLUTION INTRAVENOUS at 12:54:00

## 2018-10-19 RX ADMIN — FLUOROURACIL 3700 MG: 50 INJECTION, SOLUTION INTRAVENOUS at 13:00:00

## 2018-10-19 NOTE — PROGRESS NOTES
Pt here for C6D1.   Arrives Ambulating independently, accompanied by Other self           Modifications in dose or schedule: No, MRI ok, ok to proceed with chemotherapy and give avastin     Frequency of blood return and site check throughout administration:

## 2018-10-21 ENCOUNTER — NURSE ONLY (OUTPATIENT)
Dept: HEMATOLOGY/ONCOLOGY | Facility: HOSPITAL | Age: 73
End: 2018-10-21
Attending: INTERNAL MEDICINE
Payer: MEDICARE

## 2018-10-21 PROCEDURE — 96523 IRRIG DRUG DELIVERY DEVICE: CPT

## 2018-10-22 ENCOUNTER — TELEPHONE (OUTPATIENT)
Dept: HEMATOLOGY/ONCOLOGY | Facility: HOSPITAL | Age: 73
End: 2018-10-22

## 2018-10-22 RX ORDER — DILTIAZEM HYDROCHLORIDE 120 MG/1
120 CAPSULE, EXTENDED RELEASE ORAL DAILY
Qty: 30 CAPSULE | Refills: 5 | Status: SHIPPED | OUTPATIENT
Start: 2018-10-22 | End: 2018-12-27

## 2018-10-22 NOTE — TELEPHONE ENCOUNTER
Spoke to patient - BP consistently running over 160. Will start 120 mg diltiazem. Sent Rx to [facility]

## 2018-10-22 NOTE — TELEPHONE ENCOUNTER
Spoke with pt again before leaving for the day. She is aware that a message was sent to MD. She states she is doing fine and is taking it easy now. Instructed pt to call back office tomorrow if she does not hear back.

## 2018-10-22 NOTE — TELEPHONE ENCOUNTER
/98 this morning, now 167/90. Was told to call if systolic got above 264. Currently c/o HA and facial flushing and generally not feeling great after pump D/C yesterday.  Also has intermittent diarrhea, but feels she is drinking plenty of water and te

## 2018-10-30 ENCOUNTER — DIETICIAN VISIT (OUTPATIENT)
Dept: HEMATOLOGY/ONCOLOGY | Facility: HOSPITAL | Age: 73
End: 2018-10-30

## 2018-10-30 NOTE — PROGRESS NOTES
Nutrition F/U Note: as per chart review     Patient Name: Dominic Santos  YOB: 1945  Medical Record Number: [id number]      Account Number: [de-identified]  Dietitian: Jaden López RD     Date of visit: 10/30/2018     Diet Rx: high protein, jose l

## 2018-11-01 ENCOUNTER — OFFICE VISIT (OUTPATIENT)
Dept: HEMATOLOGY/ONCOLOGY | Age: 73
End: 2018-11-01
Attending: INTERNAL MEDICINE
Payer: MEDICARE

## 2018-11-01 VITALS
OXYGEN SATURATION: 100 % | RESPIRATION RATE: 16 BRPM | BODY MASS INDEX: 20 KG/M2 | HEART RATE: 78 BPM | SYSTOLIC BLOOD PRESSURE: 136 MMHG | TEMPERATURE: 98 F | DIASTOLIC BLOOD PRESSURE: 90 MMHG | WEIGHT: 115.5 LBS

## 2018-11-01 DIAGNOSIS — I15.9 SECONDARY HYPERTENSION: ICD-10-CM

## 2018-11-01 DIAGNOSIS — C20 RECTAL CANCER (HCC): ICD-10-CM

## 2018-11-01 DIAGNOSIS — C78.7 SECONDARY CARCINOMA OF LIVER (HCC): ICD-10-CM

## 2018-11-01 DIAGNOSIS — C78.7 SECONDARY CARCINOMA OF LIVER (HCC): Primary | ICD-10-CM

## 2018-11-01 DIAGNOSIS — C20 RECTAL CANCER (HCC): Primary | ICD-10-CM

## 2018-11-01 PROCEDURE — 96416 CHEMO PROLONG INFUSE W/PUMP: CPT

## 2018-11-01 PROCEDURE — 96413 CHEMO IV INFUSION 1 HR: CPT

## 2018-11-01 PROCEDURE — 82378 CARCINOEMBRYONIC ANTIGEN: CPT

## 2018-11-01 PROCEDURE — 96375 TX/PRO/DX INJ NEW DRUG ADDON: CPT

## 2018-11-01 PROCEDURE — 96368 THER/DIAG CONCURRENT INF: CPT

## 2018-11-01 PROCEDURE — 96417 CHEMO IV INFUS EACH ADDL SEQ: CPT

## 2018-11-01 PROCEDURE — 99214 OFFICE O/P EST MOD 30 MIN: CPT | Performed by: INTERNAL MEDICINE

## 2018-11-01 PROCEDURE — 96415 CHEMO IV INFUSION ADDL HR: CPT

## 2018-11-01 PROCEDURE — 85025 COMPLETE CBC W/AUTO DIFF WBC: CPT

## 2018-11-01 PROCEDURE — 80053 COMPREHEN METABOLIC PANEL: CPT

## 2018-11-01 PROCEDURE — 96411 CHEMO IV PUSH ADDL DRUG: CPT

## 2018-11-01 RX ORDER — FLUOROURACIL 50 MG/ML
2400 INJECTION, SOLUTION INTRAVENOUS CONTINUOUS
Status: CANCELLED | OUTPATIENT
Start: 2018-11-01

## 2018-11-01 RX ORDER — PROCHLORPERAZINE MALEATE 10 MG
10 TABLET ORAL EVERY 6 HOURS PRN
Status: CANCELLED | OUTPATIENT
Start: 2018-11-01

## 2018-11-01 RX ORDER — FLUOROURACIL 50 MG/ML
400 INJECTION, SOLUTION INTRAVENOUS ONCE
Status: COMPLETED | OUTPATIENT
Start: 2018-11-01 | End: 2018-11-01

## 2018-11-01 RX ORDER — ONDANSETRON 2 MG/ML
8 INJECTION INTRAMUSCULAR; INTRAVENOUS EVERY 6 HOURS PRN
Status: CANCELLED | OUTPATIENT
Start: 2018-11-01

## 2018-11-01 RX ORDER — LORAZEPAM 0.5 MG/1
TABLET ORAL EVERY 4 HOURS PRN
Status: CANCELLED | OUTPATIENT
Start: 2018-11-01

## 2018-11-01 RX ORDER — FLUOROURACIL 50 MG/ML
2400 INJECTION, SOLUTION INTRAVENOUS CONTINUOUS
Status: DISCONTINUED | OUTPATIENT
Start: 2018-11-01 | End: 2018-11-01

## 2018-11-01 RX ORDER — METOCLOPRAMIDE HYDROCHLORIDE 5 MG/ML
10 INJECTION INTRAMUSCULAR; INTRAVENOUS EVERY 6 HOURS PRN
Status: CANCELLED | OUTPATIENT
Start: 2018-11-01

## 2018-11-01 RX ORDER — FLUOROURACIL 50 MG/ML
400 INJECTION, SOLUTION INTRAVENOUS ONCE
Status: CANCELLED | OUTPATIENT
Start: 2018-11-01

## 2018-11-01 RX ORDER — LORAZEPAM 2 MG/ML
INJECTION INTRAMUSCULAR EVERY 4 HOURS PRN
Status: CANCELLED | OUTPATIENT
Start: 2018-11-01

## 2018-11-01 RX ADMIN — FLUOROURACIL 600 MG: 50 INJECTION, SOLUTION INTRAVENOUS at 13:18:00

## 2018-11-01 RX ADMIN — FLUOROURACIL 3700 MG: 50 INJECTION, SOLUTION INTRAVENOUS at 13:23:00

## 2018-11-01 NOTE — PATIENT INSTRUCTIONS
To reach Dr Denae Nettles nurse during business hours, please call [phone]. After hours, including weekends, evenings, and holidays, please call the main number [phone] for emergent needs.

## 2018-11-01 NOTE — PROGRESS NOTES
Pt here for C7D1.   Arrives Ambulating independently, accompanied by Spouse           Modifications in dose or schedule: No     Frequency of blood return and site check throughout administration: Prior to administration   Discharged to Home, Ambulating inde

## 2018-11-01 NOTE — PROGRESS NOTES
Patient is here for cycle 7 of chemo. Patient c/o mild cold sensitivity in hands and feet but manageable. Eating well. Denies pain. Energy level is good. Blood pressure is better controlled now that patient is on Diltiazem. No complaints.        Education R

## 2018-11-03 ENCOUNTER — NURSE ONLY (OUTPATIENT)
Dept: HEMATOLOGY/ONCOLOGY | Facility: HOSPITAL | Age: 73
End: 2018-11-03
Attending: INTERNAL MEDICINE
Payer: MEDICARE

## 2018-11-03 DIAGNOSIS — D50.9 IRON DEFICIENCY ANEMIA, UNSPECIFIED IRON DEFICIENCY ANEMIA TYPE: Primary | ICD-10-CM

## 2018-11-03 DIAGNOSIS — C20 RECTAL CANCER (HCC): ICD-10-CM

## 2018-11-03 PROCEDURE — 96523 IRRIG DRUG DELIVERY DEVICE: CPT

## 2018-11-03 RX ORDER — SODIUM CHLORIDE 0.9 % (FLUSH) 0.9 %
10 SYRINGE (ML) INJECTION ONCE
Status: CANCELLED | OUTPATIENT
Start: 2018-11-03

## 2018-11-03 RX ORDER — SODIUM CHLORIDE 0.9 % (FLUSH) 0.9 %
10 SYRINGE (ML) INJECTION ONCE
Status: COMPLETED | OUTPATIENT
Start: 2018-11-03 | End: 2018-11-03

## 2018-11-03 RX ADMIN — SODIUM CHLORIDE 0.9 % (FLUSH) 10 ML: 0.9 % SYRINGE (ML) INJECTION at 10:10:00

## 2018-11-06 NOTE — PROGRESS NOTES
[facility]    PATIENT'S NAME: Consuelo Barkercorbin   ATTENDING PHYSICIAN: Pepper Castrejon M.D.    PATIENT ACCOUNT #: [de-identified] LOCATION: [address] [place] [address] RECORD #: [id number] YOB: 1945   DATE OF SERVICE: 11/01/2018       CANCER TAHIR good.  She has been able to work at least part time. She denies any other new complaints at present.   Her current medicines include diltiazem extended release 120 mg daily, iron supplement, B12 supplement, multivitamin daily, omega-3 fatty acids, ondanset then plan on arranging for her scan and then maintenance thereafter. She is not having any local symptoms from her rectal tumor at present.     Dictated By Ginger Delgado M.D.  d: 11/05/2018 12:37:03  t: 11/06/2018 00:28:17  Hedy Krabbe [id number]  /OM0

## 2018-11-15 ENCOUNTER — OFFICE VISIT (OUTPATIENT)
Dept: HEMATOLOGY/ONCOLOGY | Age: 73
End: 2018-11-15
Attending: INTERNAL MEDICINE
Payer: MEDICARE

## 2018-11-15 VITALS
DIASTOLIC BLOOD PRESSURE: 93 MMHG | TEMPERATURE: 98 F | SYSTOLIC BLOOD PRESSURE: 160 MMHG | RESPIRATION RATE: 16 BRPM | BODY MASS INDEX: 20 KG/M2 | WEIGHT: 119 LBS | HEART RATE: 86 BPM | OXYGEN SATURATION: 98 %

## 2018-11-15 DIAGNOSIS — C78.7 SECONDARY CARCINOMA OF LIVER (HCC): ICD-10-CM

## 2018-11-15 DIAGNOSIS — I15.9 SECONDARY HYPERTENSION: ICD-10-CM

## 2018-11-15 DIAGNOSIS — C20 RECTAL CANCER (HCC): Primary | ICD-10-CM

## 2018-11-15 DIAGNOSIS — C78.7 SECONDARY CARCINOMA OF LIVER (HCC): Primary | ICD-10-CM

## 2018-11-15 DIAGNOSIS — C20 RECTAL CANCER (HCC): ICD-10-CM

## 2018-11-15 PROCEDURE — 96415 CHEMO IV INFUSION ADDL HR: CPT

## 2018-11-15 PROCEDURE — 96411 CHEMO IV PUSH ADDL DRUG: CPT

## 2018-11-15 PROCEDURE — 96375 TX/PRO/DX INJ NEW DRUG ADDON: CPT

## 2018-11-15 PROCEDURE — 96417 CHEMO IV INFUS EACH ADDL SEQ: CPT

## 2018-11-15 PROCEDURE — 85025 COMPLETE CBC W/AUTO DIFF WBC: CPT

## 2018-11-15 PROCEDURE — 82378 CARCINOEMBRYONIC ANTIGEN: CPT

## 2018-11-15 PROCEDURE — 96416 CHEMO PROLONG INFUSE W/PUMP: CPT

## 2018-11-15 PROCEDURE — 96413 CHEMO IV INFUSION 1 HR: CPT

## 2018-11-15 PROCEDURE — 96367 TX/PROPH/DG ADDL SEQ IV INF: CPT

## 2018-11-15 PROCEDURE — 96368 THER/DIAG CONCURRENT INF: CPT

## 2018-11-15 PROCEDURE — 99214 OFFICE O/P EST MOD 30 MIN: CPT | Performed by: INTERNAL MEDICINE

## 2018-11-15 PROCEDURE — 80053 COMPREHEN METABOLIC PANEL: CPT

## 2018-11-15 RX ORDER — LORAZEPAM 2 MG/ML
INJECTION INTRAMUSCULAR EVERY 4 HOURS PRN
Status: CANCELLED | OUTPATIENT
Start: 2018-11-15

## 2018-11-15 RX ORDER — ONDANSETRON 2 MG/ML
8 INJECTION INTRAMUSCULAR; INTRAVENOUS EVERY 6 HOURS PRN
Status: CANCELLED | OUTPATIENT
Start: 2018-11-15

## 2018-11-15 RX ORDER — METOCLOPRAMIDE HYDROCHLORIDE 5 MG/ML
10 INJECTION INTRAMUSCULAR; INTRAVENOUS EVERY 6 HOURS PRN
Status: CANCELLED | OUTPATIENT
Start: 2018-11-15

## 2018-11-15 RX ORDER — PROCHLORPERAZINE MALEATE 10 MG
10 TABLET ORAL EVERY 6 HOURS PRN
Status: CANCELLED | OUTPATIENT
Start: 2018-11-15

## 2018-11-15 RX ORDER — FLUOROURACIL 50 MG/ML
400 INJECTION, SOLUTION INTRAVENOUS ONCE
Status: CANCELLED | OUTPATIENT
Start: 2018-11-15

## 2018-11-15 RX ORDER — FLUOROURACIL 50 MG/ML
2400 INJECTION, SOLUTION INTRAVENOUS CONTINUOUS
Status: CANCELLED | OUTPATIENT
Start: 2018-11-15

## 2018-11-15 RX ORDER — FLUOROURACIL 50 MG/ML
400 INJECTION, SOLUTION INTRAVENOUS ONCE
Status: COMPLETED | OUTPATIENT
Start: 2018-11-15 | End: 2018-11-15

## 2018-11-15 RX ORDER — FLUOROURACIL 50 MG/ML
2400 INJECTION, SOLUTION INTRAVENOUS CONTINUOUS
Status: DISCONTINUED | OUTPATIENT
Start: 2018-11-15 | End: 2018-11-15

## 2018-11-15 RX ORDER — LORAZEPAM 0.5 MG/1
TABLET ORAL EVERY 4 HOURS PRN
Status: CANCELLED | OUTPATIENT
Start: 2018-11-15

## 2018-11-15 RX ADMIN — FLUOROURACIL 600 MG: 50 INJECTION, SOLUTION INTRAVENOUS at 13:34:00

## 2018-11-15 RX ADMIN — FLUOROURACIL 3700 MG: 50 INJECTION, SOLUTION INTRAVENOUS at 13:34:00

## 2018-11-15 NOTE — PATIENT INSTRUCTIONS
For Dr. Shravan Baptiste nurse line, call [phone] with any questions or concerns,  Monday through Friday 8:00 to 4:30.      After hours or weekends for urgent needs: [phone].   Central [phone]  Medical Records:   [phone]  Cancer [facility]

## 2018-11-15 NOTE — PROGRESS NOTES
Patient is here for MD f/u visit and cycle 8 of chemo. Patient c/o bloody nose in the mornings but it doesn't last very long. Increase fatigue the first week after chemo. Mild cold sensitivity in hands.        Education Record    Learner:  Patient    Jasen

## 2018-11-16 NOTE — PROGRESS NOTES
[facility] Progress Note    Patient Name: Priti Leon   YOB: 1945   Medical Record Number: [id number]   CSN: [id number]   Attending Physician: Federico Bansal M.D.      Date of Visit: 11/16/2018     Chief Complaint: metastatic rectal c part time. She denies any other new complaints at present.   She feels she can proceed with this eighth cycle    Performance Status:  1    Current Medications:    Current Outpatient Medications:   •  DilTIAZem HCl  MG Oral Capsule SR 24 Hr, Take 1 ca palpable lymphadenopathy. Neck is supple. Lymphatics: There is no palpable lymphadenopathy throughout in the cervical, supraclavicular, axillary, or inguinal regions. Chest: Clear to auscultation. Heart: Regular rate and rhythm.  S1S2 normal.  Abdomen: S face to face with the patient. More than 50% of that time was spent counseling the patient and/or on coordination of care. The diagnosis, prognosis, and general treatment was explained to the patient and the family.     MD John Dobson

## 2018-11-17 ENCOUNTER — NURSE ONLY (OUTPATIENT)
Dept: HEMATOLOGY/ONCOLOGY | Facility: HOSPITAL | Age: 73
End: 2018-11-17
Attending: INTERNAL MEDICINE
Payer: MEDICARE

## 2018-11-17 DIAGNOSIS — D50.9 IRON DEFICIENCY ANEMIA, UNSPECIFIED IRON DEFICIENCY ANEMIA TYPE: Primary | ICD-10-CM

## 2018-11-17 DIAGNOSIS — C20 RECTAL CANCER (HCC): ICD-10-CM

## 2018-11-17 PROCEDURE — 96523 IRRIG DRUG DELIVERY DEVICE: CPT

## 2018-11-17 RX ORDER — SODIUM CHLORIDE 0.9 % (FLUSH) 0.9 %
10 SYRINGE (ML) INJECTION ONCE
Status: CANCELLED | OUTPATIENT
Start: 2018-11-17

## 2018-11-17 RX ORDER — SODIUM CHLORIDE 0.9 % (FLUSH) 0.9 %
10 SYRINGE (ML) INJECTION ONCE
Status: COMPLETED | OUTPATIENT
Start: 2018-11-17 | End: 2018-11-17

## 2018-11-17 RX ADMIN — SODIUM CHLORIDE 0.9 % (FLUSH) 10 ML: 0.9 % SYRINGE (ML) INJECTION at 10:00:00

## 2018-11-17 NOTE — PROGRESS NOTES
Education Record    Learner:  Patient    Disease / Diagnosis:    Barriers / Limitations:  None   Comments:    Method:  Brief focused   Comments:    General Topics:  Side effects and symptom management, Plan of care reviewed and Fall risk and prevention   C

## 2018-11-29 ENCOUNTER — APPOINTMENT (OUTPATIENT)
Dept: HEMATOLOGY/ONCOLOGY | Age: 73
End: 2018-11-29
Attending: INTERNAL MEDICINE
Payer: MEDICARE

## 2018-12-06 ENCOUNTER — OFFICE VISIT (OUTPATIENT)
Dept: HEMATOLOGY/ONCOLOGY | Age: 73
End: 2018-12-06
Attending: INTERNAL MEDICINE
Payer: MEDICARE

## 2018-12-06 VITALS
TEMPERATURE: 99 F | HEIGHT: 65 IN | SYSTOLIC BLOOD PRESSURE: 154 MMHG | DIASTOLIC BLOOD PRESSURE: 98 MMHG | HEART RATE: 82 BPM | BODY MASS INDEX: 19.49 KG/M2 | OXYGEN SATURATION: 100 % | RESPIRATION RATE: 17 BRPM | WEIGHT: 117 LBS

## 2018-12-06 DIAGNOSIS — C20 RECTAL CANCER (HCC): Primary | ICD-10-CM

## 2018-12-06 DIAGNOSIS — C78.7 SECONDARY CARCINOMA OF LIVER (HCC): Primary | ICD-10-CM

## 2018-12-06 DIAGNOSIS — C78.7 SECONDARY CARCINOMA OF LIVER (HCC): ICD-10-CM

## 2018-12-06 DIAGNOSIS — I15.9 SECONDARY HYPERTENSION: ICD-10-CM

## 2018-12-06 DIAGNOSIS — C20 RECTAL CANCER (HCC): ICD-10-CM

## 2018-12-06 PROCEDURE — 82378 CARCINOEMBRYONIC ANTIGEN: CPT

## 2018-12-06 PROCEDURE — 85025 COMPLETE CBC W/AUTO DIFF WBC: CPT

## 2018-12-06 PROCEDURE — 96411 CHEMO IV PUSH ADDL DRUG: CPT

## 2018-12-06 PROCEDURE — 96367 TX/PROPH/DG ADDL SEQ IV INF: CPT

## 2018-12-06 PROCEDURE — 99214 OFFICE O/P EST MOD 30 MIN: CPT | Performed by: INTERNAL MEDICINE

## 2018-12-06 PROCEDURE — 96375 TX/PRO/DX INJ NEW DRUG ADDON: CPT

## 2018-12-06 PROCEDURE — 80053 COMPREHEN METABOLIC PANEL: CPT

## 2018-12-06 PROCEDURE — 96413 CHEMO IV INFUSION 1 HR: CPT

## 2018-12-06 PROCEDURE — 96365 THER/PROPH/DIAG IV INF INIT: CPT

## 2018-12-06 RX ORDER — FLUOROURACIL 50 MG/ML
400 INJECTION, SOLUTION INTRAVENOUS ONCE
Status: CANCELLED | OUTPATIENT
Start: 2018-12-06

## 2018-12-06 RX ORDER — METOCLOPRAMIDE HYDROCHLORIDE 5 MG/ML
10 INJECTION INTRAMUSCULAR; INTRAVENOUS EVERY 6 HOURS PRN
Status: CANCELLED | OUTPATIENT
Start: 2018-12-06

## 2018-12-06 RX ORDER — ONDANSETRON 2 MG/ML
8 INJECTION INTRAMUSCULAR; INTRAVENOUS EVERY 6 HOURS PRN
Status: CANCELLED | OUTPATIENT
Start: 2018-12-06

## 2018-12-06 RX ORDER — FLUOROURACIL 50 MG/ML
2400 INJECTION, SOLUTION INTRAVENOUS CONTINUOUS
Status: DISCONTINUED | OUTPATIENT
Start: 2018-12-06 | End: 2018-12-06

## 2018-12-06 RX ORDER — LORAZEPAM 2 MG/ML
INJECTION INTRAMUSCULAR EVERY 4 HOURS PRN
Status: CANCELLED | OUTPATIENT
Start: 2018-12-06

## 2018-12-06 RX ORDER — PROCHLORPERAZINE MALEATE 10 MG
10 TABLET ORAL EVERY 6 HOURS PRN
Status: CANCELLED | OUTPATIENT
Start: 2018-12-06

## 2018-12-06 RX ORDER — LORAZEPAM 0.5 MG/1
TABLET ORAL EVERY 4 HOURS PRN
Status: CANCELLED | OUTPATIENT
Start: 2018-12-06

## 2018-12-06 RX ORDER — FLUOROURACIL 50 MG/ML
2400 INJECTION, SOLUTION INTRAVENOUS CONTINUOUS
Status: CANCELLED | OUTPATIENT
Start: 2018-12-06

## 2018-12-06 RX ORDER — FLUOROURACIL 50 MG/ML
400 INJECTION, SOLUTION INTRAVENOUS ONCE
Status: COMPLETED | OUTPATIENT
Start: 2018-12-06 | End: 2018-12-06

## 2018-12-06 RX ADMIN — FLUOROURACIL 600 MG: 50 INJECTION, SOLUTION INTRAVENOUS at 13:34:00

## 2018-12-06 RX ADMIN — FLUOROURACIL 3700 MG: 50 INJECTION, SOLUTION INTRAVENOUS at 13:44:00

## 2018-12-06 NOTE — PROGRESS NOTES
Patient is here for MD f/u and cycle 9 of chemo. Patient c/o intermittent loose stools and urgency. Appetite is fair. Patient has a BM after every meal. Energy level is good, occasional fatigue but manageable.          Education Record    Learner:  Patient

## 2018-12-08 ENCOUNTER — NURSE ONLY (OUTPATIENT)
Dept: HEMATOLOGY/ONCOLOGY | Facility: HOSPITAL | Age: 73
End: 2018-12-08
Attending: INTERNAL MEDICINE
Payer: MEDICARE

## 2018-12-08 DIAGNOSIS — C20 RECTAL CANCER (HCC): ICD-10-CM

## 2018-12-08 DIAGNOSIS — D50.9 IRON DEFICIENCY ANEMIA, UNSPECIFIED IRON DEFICIENCY ANEMIA TYPE: Primary | ICD-10-CM

## 2018-12-08 PROCEDURE — 96523 IRRIG DRUG DELIVERY DEVICE: CPT

## 2018-12-08 RX ORDER — SODIUM CHLORIDE 0.9 % (FLUSH) 0.9 %
10 SYRINGE (ML) INJECTION ONCE
Status: CANCELLED | OUTPATIENT
Start: 2018-12-08

## 2018-12-08 RX ORDER — SODIUM CHLORIDE 0.9 % (FLUSH) 0.9 %
10 SYRINGE (ML) INJECTION ONCE
Status: COMPLETED | OUTPATIENT
Start: 2018-12-08 | End: 2018-12-08

## 2018-12-08 RX ADMIN — SODIUM CHLORIDE 0.9 % (FLUSH) 10 ML: 0.9 % SYRINGE (ML) INJECTION at 09:20:00

## 2018-12-11 ENCOUNTER — HOSPITAL ENCOUNTER (OUTPATIENT)
Facility: HOSPITAL | Age: 73
Setting detail: HOSPITAL OUTPATIENT SURGERY
Discharge: HOME | End: 2018-12-11
Attending: INTERNAL MEDICINE | Admitting: INTERNAL MEDICINE
Payer: MEDICARE

## 2018-12-11 VITALS
HEIGHT: 65 IN | DIASTOLIC BLOOD PRESSURE: 88 MMHG | RESPIRATION RATE: 18 BRPM | BODY MASS INDEX: 19.83 KG/M2 | WEIGHT: 119 LBS | OXYGEN SATURATION: 100 % | SYSTOLIC BLOOD PRESSURE: 154 MMHG | TEMPERATURE: 98 F | HEART RATE: 75 BPM

## 2018-12-11 DIAGNOSIS — C20 RECTAL CANCER (HCC): ICD-10-CM

## 2018-12-11 PROCEDURE — 0DJD8ZZ INSPECTION OF LOWER INTESTINAL TRACT, VIA NATURAL OR ARTIFICIAL OPENING ENDOSCOPIC: ICD-10-PCS | Performed by: INTERNAL MEDICINE

## 2018-12-11 RX ORDER — SODIUM CHLORIDE, SODIUM LACTATE, POTASSIUM CHLORIDE, CALCIUM CHLORIDE 600; 310; 30; 20 MG/100ML; MG/100ML; MG/100ML; MG/100ML
INJECTION, SOLUTION INTRAVENOUS CONTINUOUS
Status: DISCONTINUED | OUTPATIENT
Start: 2018-12-11 | End: 2018-12-11

## 2018-12-11 RX ORDER — SODIUM PHOSPHATE, DIBASIC AND SODIUM PHOSPHATE, MONOBASIC 7; 19 G/133ML; G/133ML
1 ENEMA RECTAL ONCE
Status: COMPLETED | OUTPATIENT
Start: 2018-12-11 | End: 2018-12-11

## 2018-12-11 NOTE — OPERATIVE REPORT
Selena Baker Patient Status:  Hospital Outpatient Surgery    1945 MRN [id number]   [facility] ENDOSCOPY Attending Dariel Scott MD   Hosp Day # 0 PCP Nyla Lyon     PREOPERATIVE DIAGNOSIS/INDICATION: H/o rectal cancer  POS

## 2018-12-11 NOTE — H&P
[address] Patient Status:  Hospital Outpatient Surgery    1945 MRN [id number]   [facility] ENDOSCOPY Attending Elsa Stokes MD   Hosp Day # 0 PCP Harley Gunderson     CC: H/o rectal adenocarc dry.    Assessment/Plan/Procedure:  Patient Active Problem List:     Iron deficiency anemia     Rectal cancer ([facility]     Secondary carcinoma of liver ([facility]     Secondary carcinoma of liver ([facility]     Secondary hypertension     Thrombocytopenia ([facility]      H/o

## 2018-12-12 NOTE — PROGRESS NOTES
[facility]    PATIENT'S NAME: Anabella Becky   ATTENDING PHYSICIAN: Robert Rivas M.D.    PATIENT ACCOUNT #: [de-identified] LOCATION: [address] [place] [address] RECORD #: [id number] YOB: 1945   DATE OF SERVICE: 12/06/2018       CANCER [facility] or gallop. ABDOMEN:  No hepatosplenomegaly or tenderness. EXTREMITIES:  She has no clubbing, cyanosis, or edema. LABORATORY DATA:  Her white count is 4.4, hemoglobin 13.4, platelets are up to 091,941. CEA dropped slightly from 31 to 29.   Her liver fu

## 2018-12-27 ENCOUNTER — OFFICE VISIT (OUTPATIENT)
Dept: HEMATOLOGY/ONCOLOGY | Age: 73
End: 2018-12-27
Attending: INTERNAL MEDICINE
Payer: MEDICARE

## 2018-12-27 VITALS
RESPIRATION RATE: 16 BRPM | BODY MASS INDEX: 20 KG/M2 | DIASTOLIC BLOOD PRESSURE: 91 MMHG | SYSTOLIC BLOOD PRESSURE: 169 MMHG | TEMPERATURE: 98 F | OXYGEN SATURATION: 100 % | WEIGHT: 120.5 LBS | HEART RATE: 78 BPM

## 2018-12-27 DIAGNOSIS — I15.8 OTHER SECONDARY HYPERTENSION: ICD-10-CM

## 2018-12-27 DIAGNOSIS — R10.9 ABDOMINAL CRAMPING: ICD-10-CM

## 2018-12-27 DIAGNOSIS — C20 RECTAL CANCER (HCC): ICD-10-CM

## 2018-12-27 DIAGNOSIS — Z51.11 ENCOUNTER FOR CHEMOTHERAPY MANAGEMENT: ICD-10-CM

## 2018-12-27 DIAGNOSIS — C78.7 SECONDARY CARCINOMA OF LIVER (HCC): Primary | ICD-10-CM

## 2018-12-27 DIAGNOSIS — T45.1X5A PERIPHERAL NEUROPATHY DUE TO CHEMOTHERAPY (HCC): ICD-10-CM

## 2018-12-27 DIAGNOSIS — I15.9 SECONDARY HYPERTENSION: ICD-10-CM

## 2018-12-27 DIAGNOSIS — D69.6 THROMBOCYTOPENIA (HCC): ICD-10-CM

## 2018-12-27 DIAGNOSIS — C20 RECTAL CANCER (HCC): Primary | ICD-10-CM

## 2018-12-27 DIAGNOSIS — C78.7 SECONDARY CARCINOMA OF LIVER (HCC): ICD-10-CM

## 2018-12-27 DIAGNOSIS — D50.9 IRON DEFICIENCY ANEMIA, UNSPECIFIED IRON DEFICIENCY ANEMIA TYPE: ICD-10-CM

## 2018-12-27 DIAGNOSIS — G62.0 PERIPHERAL NEUROPATHY DUE TO CHEMOTHERAPY (HCC): ICD-10-CM

## 2018-12-27 DIAGNOSIS — K59.1 FUNCTIONAL DIARRHEA: ICD-10-CM

## 2018-12-27 PROCEDURE — 96367 TX/PROPH/DG ADDL SEQ IV INF: CPT

## 2018-12-27 PROCEDURE — 96411 CHEMO IV PUSH ADDL DRUG: CPT

## 2018-12-27 PROCEDURE — 82378 CARCINOEMBRYONIC ANTIGEN: CPT

## 2018-12-27 PROCEDURE — 99215 OFFICE O/P EST HI 40 MIN: CPT | Performed by: CLINICAL NURSE SPECIALIST

## 2018-12-27 PROCEDURE — 96375 TX/PRO/DX INJ NEW DRUG ADDON: CPT

## 2018-12-27 PROCEDURE — 85025 COMPLETE CBC W/AUTO DIFF WBC: CPT

## 2018-12-27 PROCEDURE — 80053 COMPREHEN METABOLIC PANEL: CPT

## 2018-12-27 PROCEDURE — 96413 CHEMO IV INFUSION 1 HR: CPT

## 2018-12-27 RX ORDER — PROCHLORPERAZINE MALEATE 10 MG
10 TABLET ORAL EVERY 6 HOURS PRN
Status: CANCELLED | OUTPATIENT
Start: 2018-12-27

## 2018-12-27 RX ORDER — FLUOROURACIL 50 MG/ML
2400 INJECTION, SOLUTION INTRAVENOUS CONTINUOUS
Status: DISCONTINUED | OUTPATIENT
Start: 2018-12-27 | End: 2018-12-27

## 2018-12-27 RX ORDER — FLUOROURACIL 50 MG/ML
400 INJECTION, SOLUTION INTRAVENOUS ONCE
Status: COMPLETED | OUTPATIENT
Start: 2018-12-27 | End: 2018-12-27

## 2018-12-27 RX ORDER — DILTIAZEM HYDROCHLORIDE 120 MG/1
120 CAPSULE, EXTENDED RELEASE ORAL DAILY
Qty: 30 CAPSULE | Refills: 5 | Status: SHIPPED | OUTPATIENT
Start: 2018-12-27 | End: 2019-01-17

## 2018-12-27 RX ORDER — METOCLOPRAMIDE HYDROCHLORIDE 5 MG/ML
10 INJECTION INTRAMUSCULAR; INTRAVENOUS EVERY 6 HOURS PRN
Status: CANCELLED | OUTPATIENT
Start: 2018-12-27

## 2018-12-27 RX ORDER — ONDANSETRON 2 MG/ML
8 INJECTION INTRAMUSCULAR; INTRAVENOUS EVERY 6 HOURS PRN
Status: CANCELLED | OUTPATIENT
Start: 2018-12-27

## 2018-12-27 RX ORDER — LORAZEPAM 0.5 MG/1
TABLET ORAL EVERY 4 HOURS PRN
Status: CANCELLED | OUTPATIENT
Start: 2018-12-27

## 2018-12-27 RX ORDER — LORAZEPAM 2 MG/ML
INJECTION INTRAMUSCULAR EVERY 4 HOURS PRN
Status: CANCELLED | OUTPATIENT
Start: 2018-12-27

## 2018-12-27 RX ORDER — FLUOROURACIL 50 MG/ML
2400 INJECTION, SOLUTION INTRAVENOUS CONTINUOUS
Status: CANCELLED | OUTPATIENT
Start: 2018-12-27

## 2018-12-27 RX ORDER — FLUOROURACIL 50 MG/ML
400 INJECTION, SOLUTION INTRAVENOUS ONCE
Status: CANCELLED | OUTPATIENT
Start: 2018-12-27

## 2018-12-27 RX ADMIN — FLUOROURACIL 3700 MG: 50 INJECTION, SOLUTION INTRAVENOUS at 13:14:00

## 2018-12-27 RX ADMIN — FLUOROURACIL 600 MG: 50 INJECTION, SOLUTION INTRAVENOUS at 13:08:00

## 2018-12-27 NOTE — PROGRESS NOTES
Pt here for C10D1. Arrives Ambulating independently, accompanied by Other self           Modifications in dose or schedule: No, patient will have next treatment in 3 weeks per Dylan DUMONT.      Frequency of blood return and site check throughout administ

## 2018-12-27 NOTE — PROGRESS NOTES
ANP Visit Note    Patient Name: Windy Rolon   YOB: 1945   Medical Record Number: [id number]   CSN: [id number]   Date of visit: 12/27/2018       Chief Complaint/Reason for Visit:  Patient presents with:  Chemotherapy  Metastatic Rectal Canc children: Not on file      Years of education: Not on file      Highest education level: Not on file    Social Needs      Financial resource strain: Not on file      Food insecurity - worry: Not on file      Food insecurity - inability: Not on file      Tr in acute distress. Vital Signs: BP (!) 169/91 (BP Location: Left arm, Patient Position: Sitting, Cuff Size: adult)   Pulse 78   Temp 98.2 °F (36.8 °C) (Tympanic)   Resp 16   Wt 54.7 kg (120 lb 8 oz)   SpO2 100%   BMI 20.05 kg/m²   HEENT: EOMs intact.  PERR 100.0 fL    MCH 30.7 27.0 - 33.2 pg    MCHC 32.0 31.0 - 37.0 g/dL    RDW 14.7 11.5 - 16.0 %    RDW-SD 51.5 (H) 35.1 - 46.3 fL    Neutrophil Absolute Prelim 2.49 1.30 - 6.70 x10 (3) uL    Neutrophil Absolute 2.49 1.30 - 6.70 x10(3) uL    Lymphocyte Absolute Metastatic Rectal Cancer on chemo    Electronically Signed by:    Joshua Fu ANP-BC  Nurse Practitioner  [facility]

## 2018-12-28 ENCOUNTER — NURSE ONLY (OUTPATIENT)
Dept: HEMATOLOGY/ONCOLOGY | Age: 73
End: 2018-12-28
Attending: INTERNAL MEDICINE
Payer: MEDICARE

## 2018-12-28 PROCEDURE — 99211 OFF/OP EST MAY X REQ PHY/QHP: CPT

## 2018-12-28 NOTE — PROGRESS NOTES
Pt states when dressing this morning, she accidentally pulled on the pump and was concerned something loosened. Connections tightened and confirmed that everything was in working order.

## 2018-12-29 ENCOUNTER — NURSE ONLY (OUTPATIENT)
Dept: HEMATOLOGY/ONCOLOGY | Facility: HOSPITAL | Age: 73
End: 2018-12-29
Attending: INTERNAL MEDICINE
Payer: MEDICARE

## 2018-12-29 DIAGNOSIS — D50.9 IRON DEFICIENCY ANEMIA, UNSPECIFIED IRON DEFICIENCY ANEMIA TYPE: ICD-10-CM

## 2018-12-29 DIAGNOSIS — C20 RECTAL CANCER (HCC): Primary | ICD-10-CM

## 2018-12-29 PROCEDURE — 96523 IRRIG DRUG DELIVERY DEVICE: CPT

## 2018-12-29 RX ORDER — SODIUM CHLORIDE 0.9 % (FLUSH) 0.9 %
10 SYRINGE (ML) INJECTION ONCE
Status: COMPLETED | OUTPATIENT
Start: 2018-12-29 | End: 2018-12-29

## 2018-12-29 RX ORDER — SODIUM CHLORIDE 0.9 % (FLUSH) 0.9 %
10 SYRINGE (ML) INJECTION ONCE
Status: CANCELLED | OUTPATIENT
Start: 2018-12-29

## 2018-12-29 RX ADMIN — SODIUM CHLORIDE 0.9 % (FLUSH) 10 ML: 0.9 % SYRINGE (ML) INJECTION at 10:49:00

## 2019-01-01 ENCOUNTER — OFFICE VISIT (OUTPATIENT)
Dept: HEMATOLOGY/ONCOLOGY | Age: 74
End: 2019-01-01
Attending: INTERNAL MEDICINE
Payer: MEDICARE

## 2019-01-01 ENCOUNTER — SNF/IP PROF CHARGE ONLY (OUTPATIENT)
Dept: HEMATOLOGY/ONCOLOGY | Facility: HOSPITAL | Age: 74
End: 2019-01-01

## 2019-01-01 ENCOUNTER — TELEPHONE (OUTPATIENT)
Dept: HEMATOLOGY/ONCOLOGY | Facility: HOSPITAL | Age: 74
End: 2019-01-01

## 2019-01-01 ENCOUNTER — OFFICE VISIT (OUTPATIENT)
Dept: FAMILY MEDICINE CLINIC | Facility: CLINIC | Age: 74
End: 2019-01-01
Payer: MEDICARE

## 2019-01-01 ENCOUNTER — TELEPHONE (OUTPATIENT)
Dept: CASE MANAGEMENT | Age: 74
End: 2019-01-01

## 2019-01-01 ENCOUNTER — NURSE ONLY (OUTPATIENT)
Dept: HEMATOLOGY/ONCOLOGY | Age: 74
End: 2019-01-01
Attending: INTERNAL MEDICINE
Payer: MEDICARE

## 2019-01-01 ENCOUNTER — APPOINTMENT (OUTPATIENT)
Dept: MRI IMAGING | Facility: HOSPITAL | Age: 74
End: 2019-01-01
Attending: NURSE PRACTITIONER
Payer: MEDICARE

## 2019-01-01 ENCOUNTER — NURSE ONLY (OUTPATIENT)
Dept: HEMATOLOGY/ONCOLOGY | Facility: HOSPITAL | Age: 74
End: 2019-01-01
Attending: INTERNAL MEDICINE
Payer: MEDICARE

## 2019-01-01 ENCOUNTER — APPOINTMENT (OUTPATIENT)
Dept: CV DIAGNOSTICS | Facility: HOSPITAL | Age: 74
DRG: 194 | End: 2019-01-01
Attending: HOSPITALIST
Payer: MEDICARE

## 2019-01-01 ENCOUNTER — HOSPITAL ENCOUNTER (OUTPATIENT)
Dept: CT IMAGING | Age: 74
Discharge: HOME | End: 2019-01-01
Attending: INTERNAL MEDICINE
Payer: MEDICARE

## 2019-01-01 ENCOUNTER — APPOINTMENT (OUTPATIENT)
Dept: HEMATOLOGY/ONCOLOGY | Age: 74
End: 2019-01-01
Attending: INTERNAL MEDICINE
Payer: MEDICARE

## 2019-01-01 ENCOUNTER — APPOINTMENT (OUTPATIENT)
Dept: CT IMAGING | Facility: HOSPITAL | Age: 74
End: 2019-01-01
Attending: EMERGENCY MEDICINE
Payer: MEDICARE

## 2019-01-01 ENCOUNTER — TELEPHONE (OUTPATIENT)
Dept: FAMILY MEDICINE CLINIC | Facility: CLINIC | Age: 74
End: 2019-01-01

## 2019-01-01 ENCOUNTER — SOCIAL WORK SERVICES (OUTPATIENT)
Dept: HEMATOLOGY/ONCOLOGY | Facility: HOSPITAL | Age: 74
End: 2019-01-01

## 2019-01-01 ENCOUNTER — HOSPITAL ENCOUNTER (OUTPATIENT)
Dept: GENERAL RADIOLOGY | Facility: HOSPITAL | Age: 74
Discharge: HOME | End: 2019-01-01
Attending: FAMILY MEDICINE
Payer: MEDICARE

## 2019-01-01 ENCOUNTER — HOSPITAL ENCOUNTER (OUTPATIENT)
Facility: HOSPITAL | Age: 74
Setting detail: OBSERVATION
Discharge: HOME | End: 2019-01-01
Attending: EMERGENCY MEDICINE | Admitting: HOSPITALIST
Payer: MEDICARE

## 2019-01-01 ENCOUNTER — APPOINTMENT (OUTPATIENT)
Dept: ULTRASOUND IMAGING | Facility: HOSPITAL | Age: 74
End: 2019-01-01
Attending: Other
Payer: MEDICARE

## 2019-01-01 ENCOUNTER — TELEPHONE (OUTPATIENT)
Dept: HEMATOLOGY/ONCOLOGY | Age: 74
End: 2019-01-01

## 2019-01-01 ENCOUNTER — HOSPITAL ENCOUNTER (EMERGENCY)
Facility: HOSPITAL | Age: 74
Discharge: HOME | End: 2019-01-01
Attending: EMERGENCY MEDICINE
Payer: MEDICARE

## 2019-01-01 ENCOUNTER — APPOINTMENT (OUTPATIENT)
Dept: GENERAL RADIOLOGY | Facility: HOSPITAL | Age: 74
DRG: 194 | End: 2019-01-01
Attending: INTERNAL MEDICINE
Payer: MEDICARE

## 2019-01-01 ENCOUNTER — TELEPHONE (OUTPATIENT)
Dept: NEUROLOGY | Facility: CLINIC | Age: 74
End: 2019-01-01

## 2019-01-01 ENCOUNTER — OFFICE VISIT (OUTPATIENT)
Dept: NEUROLOGY | Facility: CLINIC | Age: 74
End: 2019-01-01
Payer: MEDICARE

## 2019-01-01 ENCOUNTER — APPOINTMENT (OUTPATIENT)
Dept: GENERAL RADIOLOGY | Age: 74
End: 2019-01-01
Attending: EMERGENCY MEDICINE
Payer: MEDICARE

## 2019-01-01 ENCOUNTER — HOSPITAL ENCOUNTER (EMERGENCY)
Age: 74
Discharge: HOME | End: 2019-01-01
Attending: EMERGENCY MEDICINE
Payer: MEDICARE

## 2019-01-01 ENCOUNTER — APPOINTMENT (OUTPATIENT)
Dept: CV DIAGNOSTICS | Facility: HOSPITAL | Age: 74
End: 2019-01-01
Attending: NURSE PRACTITIONER
Payer: MEDICARE

## 2019-01-01 ENCOUNTER — PATIENT MESSAGE (OUTPATIENT)
Dept: HEMATOLOGY/ONCOLOGY | Age: 74
End: 2019-01-01

## 2019-01-01 VITALS
HEART RATE: 85 BPM | DIASTOLIC BLOOD PRESSURE: 76 MMHG | WEIGHT: 113.5 LBS | OXYGEN SATURATION: 100 % | SYSTOLIC BLOOD PRESSURE: 130 MMHG | RESPIRATION RATE: 16 BRPM | TEMPERATURE: 100 F | BODY MASS INDEX: 25 KG/M2

## 2019-01-01 VITALS
DIASTOLIC BLOOD PRESSURE: 76 MMHG | HEART RATE: 83 BPM | BODY MASS INDEX: 20.45 KG/M2 | SYSTOLIC BLOOD PRESSURE: 129 MMHG | WEIGHT: 114 LBS | HEIGHT: 62.76 IN | RESPIRATION RATE: 18 BRPM | OXYGEN SATURATION: 98 % | TEMPERATURE: 99 F

## 2019-01-01 VITALS
HEART RATE: 82 BPM | DIASTOLIC BLOOD PRESSURE: 79 MMHG | OXYGEN SATURATION: 100 % | TEMPERATURE: 100 F | HEIGHT: 62.99 IN | SYSTOLIC BLOOD PRESSURE: 152 MMHG | WEIGHT: 116 LBS | BODY MASS INDEX: 20.55 KG/M2 | RESPIRATION RATE: 16 BRPM

## 2019-01-01 VITALS
SYSTOLIC BLOOD PRESSURE: 135 MMHG | HEART RATE: 69 BPM | OXYGEN SATURATION: 100 % | WEIGHT: 116.5 LBS | TEMPERATURE: 98 F | DIASTOLIC BLOOD PRESSURE: 75 MMHG | RESPIRATION RATE: 16 BRPM | BODY MASS INDEX: 21 KG/M2

## 2019-01-01 VITALS
BODY MASS INDEX: 20.55 KG/M2 | SYSTOLIC BLOOD PRESSURE: 140 MMHG | DIASTOLIC BLOOD PRESSURE: 76 MMHG | OXYGEN SATURATION: 100 % | RESPIRATION RATE: 16 BRPM | TEMPERATURE: 99 F | HEIGHT: 62.99 IN | WEIGHT: 116 LBS | HEART RATE: 94 BPM

## 2019-01-01 VITALS
RESPIRATION RATE: 18 BRPM | SYSTOLIC BLOOD PRESSURE: 103 MMHG | HEART RATE: 80 BPM | DIASTOLIC BLOOD PRESSURE: 61 MMHG | OXYGEN SATURATION: 99 % | TEMPERATURE: 99 F

## 2019-01-01 VITALS
HEART RATE: 93 BPM | WEIGHT: 116 LBS | OXYGEN SATURATION: 95 % | DIASTOLIC BLOOD PRESSURE: 89 MMHG | TEMPERATURE: 98 F | HEIGHT: 57 IN | RESPIRATION RATE: 16 BRPM | SYSTOLIC BLOOD PRESSURE: 138 MMHG | BODY MASS INDEX: 25.03 KG/M2

## 2019-01-01 VITALS
DIASTOLIC BLOOD PRESSURE: 80 MMHG | HEART RATE: 66 BPM | BODY MASS INDEX: 20.46 KG/M2 | WEIGHT: 115.5 LBS | OXYGEN SATURATION: 99 % | TEMPERATURE: 98 F | RESPIRATION RATE: 16 BRPM | SYSTOLIC BLOOD PRESSURE: 120 MMHG | HEIGHT: 62.99 IN

## 2019-01-01 VITALS
HEART RATE: 81 BPM | RESPIRATION RATE: 22 BRPM | BODY MASS INDEX: 20 KG/M2 | WEIGHT: 114 LBS | OXYGEN SATURATION: 98 % | SYSTOLIC BLOOD PRESSURE: 139 MMHG | TEMPERATURE: 98 F | DIASTOLIC BLOOD PRESSURE: 94 MMHG

## 2019-01-01 VITALS
RESPIRATION RATE: 16 BRPM | TEMPERATURE: 103 F | SYSTOLIC BLOOD PRESSURE: 152 MMHG | OXYGEN SATURATION: 100 % | DIASTOLIC BLOOD PRESSURE: 81 MMHG | HEART RATE: 114 BPM

## 2019-01-01 VITALS
DIASTOLIC BLOOD PRESSURE: 70 MMHG | SYSTOLIC BLOOD PRESSURE: 122 MMHG | WEIGHT: 107.81 LBS | OXYGEN SATURATION: 100 % | HEIGHT: 62.76 IN | HEART RATE: 87 BPM | TEMPERATURE: 99 F | RESPIRATION RATE: 20 BRPM | BODY MASS INDEX: 19.34 KG/M2

## 2019-01-01 VITALS
HEIGHT: 65 IN | DIASTOLIC BLOOD PRESSURE: 91 MMHG | RESPIRATION RATE: 16 BRPM | TEMPERATURE: 99 F | WEIGHT: 140.5 LBS | HEART RATE: 103 BPM | OXYGEN SATURATION: 99 % | SYSTOLIC BLOOD PRESSURE: 137 MMHG | BODY MASS INDEX: 23.41 KG/M2

## 2019-01-01 VITALS
RESPIRATION RATE: 16 BRPM | WEIGHT: 116 LBS | SYSTOLIC BLOOD PRESSURE: 115 MMHG | HEART RATE: 78 BPM | OXYGEN SATURATION: 99 % | DIASTOLIC BLOOD PRESSURE: 74 MMHG | BODY MASS INDEX: 21 KG/M2 | TEMPERATURE: 99 F

## 2019-01-01 VITALS
SYSTOLIC BLOOD PRESSURE: 130 MMHG | BODY MASS INDEX: 21 KG/M2 | TEMPERATURE: 98 F | WEIGHT: 116.5 LBS | RESPIRATION RATE: 16 BRPM | HEART RATE: 68 BPM | OXYGEN SATURATION: 100 % | DIASTOLIC BLOOD PRESSURE: 75 MMHG

## 2019-01-01 VITALS
RESPIRATION RATE: 16 BRPM | SYSTOLIC BLOOD PRESSURE: 138 MMHG | TEMPERATURE: 99 F | SYSTOLIC BLOOD PRESSURE: 133 MMHG | HEART RATE: 73 BPM | RESPIRATION RATE: 18 BRPM | WEIGHT: 112.5 LBS | OXYGEN SATURATION: 99 % | DIASTOLIC BLOOD PRESSURE: 79 MMHG | OXYGEN SATURATION: 99 % | HEART RATE: 82 BPM | BODY MASS INDEX: 20 KG/M2 | DIASTOLIC BLOOD PRESSURE: 85 MMHG | BODY MASS INDEX: 17 KG/M2 | WEIGHT: 104.5 LBS | TEMPERATURE: 99 F

## 2019-01-01 VITALS
TEMPERATURE: 98 F | HEART RATE: 80 BPM | OXYGEN SATURATION: 99 % | SYSTOLIC BLOOD PRESSURE: 128 MMHG | RESPIRATION RATE: 16 BRPM | BODY MASS INDEX: 21 KG/M2 | DIASTOLIC BLOOD PRESSURE: 76 MMHG | WEIGHT: 116.5 LBS

## 2019-01-01 VITALS
DIASTOLIC BLOOD PRESSURE: 74 MMHG | OXYGEN SATURATION: 98 % | HEART RATE: 96 BPM | RESPIRATION RATE: 16 BRPM | BODY MASS INDEX: 20.73 KG/M2 | SYSTOLIC BLOOD PRESSURE: 120 MMHG | TEMPERATURE: 98 F | WEIGHT: 117 LBS | HEIGHT: 62.99 IN

## 2019-01-01 VITALS
RESPIRATION RATE: 18 BRPM | DIASTOLIC BLOOD PRESSURE: 70 MMHG | TEMPERATURE: 100 F | BODY MASS INDEX: 21 KG/M2 | SYSTOLIC BLOOD PRESSURE: 142 MMHG | HEART RATE: 108 BPM | WEIGHT: 116.88 LBS | OXYGEN SATURATION: 97 %

## 2019-01-01 VITALS
DIASTOLIC BLOOD PRESSURE: 63 MMHG | SYSTOLIC BLOOD PRESSURE: 98 MMHG | OXYGEN SATURATION: 100 % | RESPIRATION RATE: 16 BRPM | HEART RATE: 94 BPM | TEMPERATURE: 100 F

## 2019-01-01 VITALS
SYSTOLIC BLOOD PRESSURE: 131 MMHG | WEIGHT: 102 LBS | DIASTOLIC BLOOD PRESSURE: 87 MMHG | HEIGHT: 65 IN | HEART RATE: 88 BPM | BODY MASS INDEX: 17 KG/M2 | TEMPERATURE: 98 F | OXYGEN SATURATION: 100 % | RESPIRATION RATE: 16 BRPM

## 2019-01-01 VITALS
SYSTOLIC BLOOD PRESSURE: 130 MMHG | BODY MASS INDEX: 22 KG/M2 | TEMPERATURE: 98 F | DIASTOLIC BLOOD PRESSURE: 89 MMHG | HEART RATE: 103 BPM | RESPIRATION RATE: 20 BRPM | WEIGHT: 134.5 LBS | OXYGEN SATURATION: 95 %

## 2019-01-01 VITALS
DIASTOLIC BLOOD PRESSURE: 85 MMHG | SYSTOLIC BLOOD PRESSURE: 138 MMHG | BODY MASS INDEX: 20 KG/M2 | WEIGHT: 113 LBS | TEMPERATURE: 99 F | OXYGEN SATURATION: 100 % | RESPIRATION RATE: 18 BRPM | HEART RATE: 73 BPM

## 2019-01-01 VITALS
HEART RATE: 80 BPM | BODY MASS INDEX: 19.46 KG/M2 | SYSTOLIC BLOOD PRESSURE: 130 MMHG | HEIGHT: 64 IN | TEMPERATURE: 97 F | WEIGHT: 114 LBS | DIASTOLIC BLOOD PRESSURE: 70 MMHG | RESPIRATION RATE: 14 BRPM

## 2019-01-01 VITALS
OXYGEN SATURATION: 98 % | RESPIRATION RATE: 16 BRPM | DIASTOLIC BLOOD PRESSURE: 77 MMHG | HEART RATE: 87 BPM | SYSTOLIC BLOOD PRESSURE: 115 MMHG | TEMPERATURE: 99 F

## 2019-01-01 VITALS
HEART RATE: 74 BPM | WEIGHT: 113.5 LBS | HEIGHT: 62.76 IN | OXYGEN SATURATION: 98 % | RESPIRATION RATE: 16 BRPM | DIASTOLIC BLOOD PRESSURE: 74 MMHG | SYSTOLIC BLOOD PRESSURE: 135 MMHG | TEMPERATURE: 99 F | BODY MASS INDEX: 20.36 KG/M2

## 2019-01-01 VITALS
DIASTOLIC BLOOD PRESSURE: 72 MMHG | SYSTOLIC BLOOD PRESSURE: 110 MMHG | WEIGHT: 114 LBS | HEART RATE: 76 BPM | BODY MASS INDEX: 20 KG/M2 | RESPIRATION RATE: 16 BRPM

## 2019-01-01 VITALS — BODY MASS INDEX: 20 KG/M2 | HEIGHT: 63 IN

## 2019-01-01 DIAGNOSIS — C78.7 SECONDARY CARCINOMA OF LIVER (HCC): ICD-10-CM

## 2019-01-01 DIAGNOSIS — D50.0 IRON DEFICIENCY ANEMIA DUE TO CHRONIC BLOOD LOSS: ICD-10-CM

## 2019-01-01 DIAGNOSIS — C78.7 LIVER METASTASES (HCC): ICD-10-CM

## 2019-01-01 DIAGNOSIS — C20 RECTAL CANCER (HCC): Primary | ICD-10-CM

## 2019-01-01 DIAGNOSIS — C78.7 METASTATIC COLON CANCER TO LIVER (HCC): ICD-10-CM

## 2019-01-01 DIAGNOSIS — Z86.73 HISTORY OF CVA (CEREBROVASCULAR ACCIDENT): ICD-10-CM

## 2019-01-01 DIAGNOSIS — D50.9 IRON DEFICIENCY ANEMIA, UNSPECIFIED IRON DEFICIENCY ANEMIA TYPE: ICD-10-CM

## 2019-01-01 DIAGNOSIS — R63.0 DECREASED APPETITE: ICD-10-CM

## 2019-01-01 DIAGNOSIS — C20 RECTAL CANCER (HCC): ICD-10-CM

## 2019-01-01 DIAGNOSIS — E87.1 HYPONATREMIA: ICD-10-CM

## 2019-01-01 DIAGNOSIS — K62.5 RECTAL BLEEDING: Primary | ICD-10-CM

## 2019-01-01 DIAGNOSIS — Z51.11 ENCOUNTER FOR CHEMOTHERAPY MANAGEMENT: ICD-10-CM

## 2019-01-01 DIAGNOSIS — G62.0 PERIPHERAL NEUROPATHY DUE TO CHEMOTHERAPY (HCC): ICD-10-CM

## 2019-01-01 DIAGNOSIS — G45.9 TIA (TRANSIENT ISCHEMIC ATTACK): Primary | ICD-10-CM

## 2019-01-01 DIAGNOSIS — D50.0 IRON DEFICIENCY ANEMIA DUE TO CHRONIC BLOOD LOSS: Primary | ICD-10-CM

## 2019-01-01 DIAGNOSIS — T45.1X5A PERIPHERAL NEUROPATHY DUE TO CHEMOTHERAPY (HCC): ICD-10-CM

## 2019-01-01 DIAGNOSIS — J18.9 COMMUNITY ACQUIRED PNEUMONIA, UNSPECIFIED LATERALITY: ICD-10-CM

## 2019-01-01 DIAGNOSIS — R05.9 COUGH: ICD-10-CM

## 2019-01-01 DIAGNOSIS — G89.3 CHRONIC PAIN DUE TO MALIGNANT NEOPLASTIC DISEASE: ICD-10-CM

## 2019-01-01 DIAGNOSIS — R51.9 HEADACHE IN BACK OF HEAD: ICD-10-CM

## 2019-01-01 DIAGNOSIS — K52.1 DIARRHEA DUE TO DRUG: ICD-10-CM

## 2019-01-01 DIAGNOSIS — R05.9 COUGH: Primary | ICD-10-CM

## 2019-01-01 DIAGNOSIS — Z71.9 ENCOUNTER FOR HEALTH EDUCATION: ICD-10-CM

## 2019-01-01 DIAGNOSIS — J22 ACUTE LOWER RESPIRATORY INFECTION: Primary | ICD-10-CM

## 2019-01-01 DIAGNOSIS — R97.0 ELEVATED CARCINOEMBRYONIC ANTIGEN (CEA): ICD-10-CM

## 2019-01-01 DIAGNOSIS — D50.9 IRON DEFICIENCY ANEMIA, UNSPECIFIED IRON DEFICIENCY ANEMIA TYPE: Primary | ICD-10-CM

## 2019-01-01 DIAGNOSIS — C18.9 METASTATIC COLON CANCER TO LIVER (HCC): ICD-10-CM

## 2019-01-01 DIAGNOSIS — R79.89 ELEVATED LFTS: ICD-10-CM

## 2019-01-01 DIAGNOSIS — R14.0 ABDOMINAL DISTENSION: ICD-10-CM

## 2019-01-01 DIAGNOSIS — E78.2 MIXED HYPERLIPIDEMIA: ICD-10-CM

## 2019-01-01 DIAGNOSIS — R53.0 NEOPLASTIC MALIGNANT RELATED FATIGUE: ICD-10-CM

## 2019-01-01 DIAGNOSIS — I63.9 ACUTE CVA (CEREBROVASCULAR ACCIDENT) (HCC): Primary | ICD-10-CM

## 2019-01-01 DIAGNOSIS — D69.6 THROMBOCYTOPENIA (HCC): ICD-10-CM

## 2019-01-01 DIAGNOSIS — R50.9 FUO (FEVER OF UNKNOWN ORIGIN): Primary | ICD-10-CM

## 2019-01-01 DIAGNOSIS — Z78.9 STATIN INTOLERANCE: ICD-10-CM

## 2019-01-01 DIAGNOSIS — I63.9 ACUTE CVA (CEREBROVASCULAR ACCIDENT) (HCC): ICD-10-CM

## 2019-01-01 DIAGNOSIS — D63.8 ANEMIA OF CHRONIC DISEASE: ICD-10-CM

## 2019-01-01 DIAGNOSIS — R60.0 BILATERAL LEG EDEMA: ICD-10-CM

## 2019-01-01 LAB
ALBUMIN SERPL-MCNC: 1.8 G/DL (ref 3.4–5)
ALBUMIN SERPL-MCNC: 2.3 G/DL (ref 3.4–5)
ALBUMIN SERPL-MCNC: 2.3 G/DL (ref 3.4–5)
ALBUMIN SERPL-MCNC: 2.4 G/DL (ref 3.4–5)
ALBUMIN SERPL-MCNC: 2.4 G/DL (ref 3.4–5)
ALBUMIN SERPL-MCNC: 2.5 G/DL (ref 3.4–5)
ALBUMIN SERPL-MCNC: 2.5 G/DL (ref 3.4–5)
ALBUMIN SERPL-MCNC: 2.6 G/DL (ref 3.4–5)
ALBUMIN SERPL-MCNC: 2.6 G/DL (ref 3.4–5)
ALBUMIN/GLOB SERPL: 0.3 {RATIO} (ref 1–2)
ALBUMIN/GLOB SERPL: 0.5 {RATIO} (ref 1–2)
ALBUMIN/GLOB SERPL: 0.6 {RATIO} (ref 1–2)
ALP LIVER SERPL-CCNC: 356 U/L (ref 55–142)
ALP LIVER SERPL-CCNC: 363 U/L (ref 55–142)
ALP LIVER SERPL-CCNC: 376 U/L (ref 55–142)
ALP LIVER SERPL-CCNC: 382 U/L (ref 55–142)
ALP LIVER SERPL-CCNC: 400 U/L (ref 55–142)
ALP LIVER SERPL-CCNC: 406 U/L (ref 55–142)
ALP LIVER SERPL-CCNC: 407 U/L (ref 55–142)
ALP LIVER SERPL-CCNC: 428 U/L (ref 55–142)
ALP LIVER SERPL-CCNC: 443 U/L (ref 55–142)
ALT SERPL-CCNC: 35 U/L (ref 13–56)
ALT SERPL-CCNC: 37 U/L (ref 13–56)
ALT SERPL-CCNC: 39 U/L (ref 13–56)
ALT SERPL-CCNC: 40 U/L (ref 13–56)
ALT SERPL-CCNC: 52 U/L (ref 13–56)
ALT SERPL-CCNC: 62 U/L (ref 13–56)
ALT SERPL-CCNC: 62 U/L (ref 13–56)
ALT SERPL-CCNC: 67 U/L (ref 13–56)
ALT SERPL-CCNC: 71 U/L (ref 13–56)
ANION GAP SERPL CALC-SCNC: 5 MMOL/L (ref 0–18)
ANION GAP SERPL CALC-SCNC: 5 MMOL/L (ref 0–18)
ANION GAP SERPL CALC-SCNC: 6 MMOL/L (ref 0–18)
ANION GAP SERPL CALC-SCNC: 8 MMOL/L (ref 0–18)
APTT PPP: 29.7 SECONDS (ref 25.4–36.1)
APTT PPP: 31.4 SECONDS (ref 25.4–36.1)
AST SERPL-CCNC: 115 U/L (ref 15–37)
AST SERPL-CCNC: 30 U/L (ref 15–37)
AST SERPL-CCNC: 35 U/L (ref 15–37)
AST SERPL-CCNC: 42 U/L (ref 15–37)
AST SERPL-CCNC: 45 U/L (ref 15–37)
AST SERPL-CCNC: 52 U/L (ref 15–37)
AST SERPL-CCNC: 52 U/L (ref 15–37)
AST SERPL-CCNC: 58 U/L (ref 15–37)
AST SERPL-CCNC: 62 U/L (ref 15–37)
ATRIAL RATE: 70 BPM
BASOPHILS # BLD AUTO: 0.01 X10(3) UL (ref 0–0.2)
BASOPHILS # BLD AUTO: 0.02 X10(3) UL (ref 0–0.2)
BASOPHILS # BLD AUTO: 0.03 X10(3) UL (ref 0–0.2)
BASOPHILS NFR BLD AUTO: 0.1 %
BASOPHILS NFR BLD AUTO: 0.1 %
BASOPHILS NFR BLD AUTO: 0.2 %
BASOPHILS NFR BLD AUTO: 0.4 %
BASOPHILS NFR BLD AUTO: 0.4 %
BASOPHILS NFR BLD AUTO: 0.5 %
BILIRUB SERPL-MCNC: 0.3 MG/DL (ref 0.1–2)
BILIRUB SERPL-MCNC: 0.4 MG/DL (ref 0.1–2)
BILIRUB SERPL-MCNC: 0.5 MG/DL (ref 0.1–2)
BILIRUB SERPL-MCNC: 0.6 MG/DL (ref 0.1–2)
BILIRUB SERPL-MCNC: 0.6 MG/DL (ref 0.1–2)
BILIRUB SERPL-MCNC: 0.7 MG/DL (ref 0.1–2)
BILIRUB SERPL-MCNC: 0.7 MG/DL (ref 0.1–2)
BILIRUB UR QL STRIP.AUTO: NEGATIVE
BUN BLD-MCNC: 10 MG/DL (ref 7–18)
BUN BLD-MCNC: 10 MG/DL (ref 7–18)
BUN BLD-MCNC: 11 MG/DL (ref 7–18)
BUN BLD-MCNC: 12 MG/DL (ref 7–18)
BUN BLD-MCNC: 14 MG/DL (ref 7–18)
BUN BLD-MCNC: 8 MG/DL (ref 7–18)
BUN BLD-MCNC: 9 MG/DL (ref 7–18)
BUN/CREAT SERPL: 16 (ref 10–20)
BUN/CREAT SERPL: 18 (ref 10–20)
BUN/CREAT SERPL: 18.8 (ref 10–20)
BUN/CREAT SERPL: 20 (ref 10–20)
BUN/CREAT SERPL: 22 (ref 10–20)
BUN/CREAT SERPL: 22.5 (ref 10–20)
BUN/CREAT SERPL: 22.6 (ref 10–20)
BUN/CREAT SERPL: 22.7 (ref 10–20)
BUN/CREAT SERPL: 28.6 (ref 10–20)
CALCIUM BLD-MCNC: 8.2 MG/DL (ref 8.5–10.1)
CALCIUM BLD-MCNC: 8.7 MG/DL (ref 8.5–10.1)
CALCIUM BLD-MCNC: 8.8 MG/DL (ref 8.5–10.1)
CALCIUM BLD-MCNC: 8.9 MG/DL (ref 8.5–10.1)
CALCIUM BLD-MCNC: 9 MG/DL (ref 8.5–10.1)
CALCIUM BLD-MCNC: 9 MG/DL (ref 8.5–10.1)
CALCIUM BLD-MCNC: 9.1 MG/DL (ref 8.5–10.1)
CALCIUM BLD-MCNC: 9.1 MG/DL (ref 8.5–10.1)
CALCIUM BLD-MCNC: 9.3 MG/DL (ref 8.5–10.1)
CEA SERPL-MCNC: 104.9 NG/ML (ref ?–5)
CEA SERPL-MCNC: 22.5 NG/ML (ref ?–5)
CEA SERPL-MCNC: 30.4 NG/ML (ref ?–5)
CEA SERPL-MCNC: 43.6 NG/ML (ref ?–5)
CEA SERPL-MCNC: 45.5 NG/ML (ref ?–5)
CEA SERPL-MCNC: 50.2 NG/ML (ref ?–5)
CHLORIDE SERPL-SCNC: 101 MMOL/L (ref 98–112)
CHLORIDE SERPL-SCNC: 103 MMOL/L (ref 98–112)
CHLORIDE SERPL-SCNC: 105 MMOL/L (ref 98–112)
CHLORIDE SERPL-SCNC: 105 MMOL/L (ref 98–112)
CHLORIDE SERPL-SCNC: 106 MMOL/L (ref 98–112)
CHLORIDE SERPL-SCNC: 107 MMOL/L (ref 98–112)
CHLORIDE SERPL-SCNC: 107 MMOL/L (ref 98–112)
CHOLEST SMN-MCNC: 226 MG/DL (ref ?–200)
CLARITY UR REFRACT.AUTO: CLEAR
CO2 SERPL-SCNC: 23 MMOL/L (ref 21–32)
CO2 SERPL-SCNC: 24 MMOL/L (ref 21–32)
CO2 SERPL-SCNC: 26 MMOL/L (ref 21–32)
CO2 SERPL-SCNC: 26 MMOL/L (ref 21–32)
CO2 SERPL-SCNC: 27 MMOL/L (ref 21–32)
CO2 SERPL-SCNC: 28 MMOL/L (ref 21–32)
COLOR UR AUTO: YELLOW
CREAT BLD-MCNC: 0.4 MG/DL (ref 0.55–1.02)
CREAT BLD-MCNC: 0.44 MG/DL (ref 0.55–1.02)
CREAT BLD-MCNC: 0.48 MG/DL (ref 0.55–1.02)
CREAT BLD-MCNC: 0.49 MG/DL (ref 0.55–1.02)
CREAT BLD-MCNC: 0.5 MG/DL (ref 0.55–1.02)
CREAT BLD-MCNC: 0.53 MG/DL (ref 0.55–1.02)
DEPRECATED RDW RBC AUTO: 47.2 FL (ref 35.1–46.3)
DEPRECATED RDW RBC AUTO: 47.9 FL (ref 35.1–46.3)
DEPRECATED RDW RBC AUTO: 48.3 FL (ref 35.1–46.3)
DEPRECATED RDW RBC AUTO: 48.4 FL (ref 35.1–46.3)
DEPRECATED RDW RBC AUTO: 49.1 FL (ref 35.1–46.3)
DEPRECATED RDW RBC AUTO: 50.1 FL (ref 35.1–46.3)
DEPRECATED RDW RBC AUTO: 50.4 FL (ref 35.1–46.3)
DEPRECATED RDW RBC AUTO: 50.8 FL (ref 35.1–46.3)
DEPRECATED RDW RBC AUTO: 51.9 FL (ref 35.1–46.3)
EOSINOPHIL # BLD AUTO: 0.04 X10(3) UL (ref 0–0.7)
EOSINOPHIL # BLD AUTO: 0.08 X10(3) UL (ref 0–0.7)
EOSINOPHIL # BLD AUTO: 0.09 X10(3) UL (ref 0–0.7)
EOSINOPHIL # BLD AUTO: 0.11 X10(3) UL (ref 0–0.7)
EOSINOPHIL # BLD AUTO: 0.12 X10(3) UL (ref 0–0.7)
EOSINOPHIL # BLD AUTO: 0.12 X10(3) UL (ref 0–0.7)
EOSINOPHIL # BLD AUTO: 0.19 X10(3) UL (ref 0–0.7)
EOSINOPHIL NFR BLD AUTO: 0.3 %
EOSINOPHIL NFR BLD AUTO: 1.1 %
EOSINOPHIL NFR BLD AUTO: 1.5 %
EOSINOPHIL NFR BLD AUTO: 1.7 %
EOSINOPHIL NFR BLD AUTO: 2 %
EOSINOPHIL NFR BLD AUTO: 2.1 %
EOSINOPHIL NFR BLD AUTO: 2.3 %
EOSINOPHIL NFR BLD AUTO: 2.4 %
EOSINOPHIL NFR BLD AUTO: 3.3 %
ERYTHROCYTE [DISTWIDTH] IN BLOOD BY AUTOMATED COUNT: 15.3 % (ref 11–15)
ERYTHROCYTE [DISTWIDTH] IN BLOOD BY AUTOMATED COUNT: 15.6 % (ref 11–15)
ERYTHROCYTE [DISTWIDTH] IN BLOOD BY AUTOMATED COUNT: 15.7 % (ref 11–15)
ERYTHROCYTE [DISTWIDTH] IN BLOOD BY AUTOMATED COUNT: 15.7 % (ref 11–15)
ERYTHROCYTE [DISTWIDTH] IN BLOOD BY AUTOMATED COUNT: 15.9 % (ref 11–15)
ERYTHROCYTE [DISTWIDTH] IN BLOOD BY AUTOMATED COUNT: 16.1 % (ref 11–15)
ERYTHROCYTE [DISTWIDTH] IN BLOOD BY AUTOMATED COUNT: 18.2 % (ref 11–15)
EST. AVERAGE GLUCOSE BLD GHB EST-MCNC: 126 MG/DL (ref 68–126)
GLOBULIN PLAS-MCNC: 4.4 G/DL (ref 2.8–4.4)
GLOBULIN PLAS-MCNC: 4.5 G/DL (ref 2.8–4.4)
GLOBULIN PLAS-MCNC: 4.6 G/DL (ref 2.8–4.4)
GLOBULIN PLAS-MCNC: 4.6 G/DL (ref 2.8–4.4)
GLOBULIN PLAS-MCNC: 4.8 G/DL (ref 2.8–4.4)
GLOBULIN PLAS-MCNC: 5.8 G/DL (ref 2.8–4.4)
GLUCOSE BLD-MCNC: 101 MG/DL (ref 70–99)
GLUCOSE BLD-MCNC: 106 MG/DL (ref 70–99)
GLUCOSE BLD-MCNC: 108 MG/DL (ref 70–99)
GLUCOSE BLD-MCNC: 108 MG/DL (ref 70–99)
GLUCOSE BLD-MCNC: 109 MG/DL (ref 70–99)
GLUCOSE BLD-MCNC: 115 MG/DL (ref 70–99)
GLUCOSE BLD-MCNC: 116 MG/DL (ref 70–99)
GLUCOSE BLD-MCNC: 117 MG/DL (ref 70–99)
GLUCOSE BLD-MCNC: 118 MG/DL (ref 70–99)
GLUCOSE BLD-MCNC: 120 MG/DL (ref 70–99)
GLUCOSE BLD-MCNC: 121 MG/DL (ref 70–99)
GLUCOSE BLD-MCNC: 128 MG/DL (ref 70–99)
GLUCOSE BLD-MCNC: 147 MG/DL (ref 70–99)
GLUCOSE BLD-MCNC: 149 MG/DL (ref 70–99)
GLUCOSE BLD-MCNC: 163 MG/DL (ref 70–99)
GLUCOSE BLD-MCNC: 87 MG/DL (ref 70–99)
GLUCOSE UR STRIP.AUTO-MCNC: 100 MG/DL
HBA1C MFR BLD HPLC: 6 % (ref ?–5.7)
HCT VFR BLD AUTO: 27 % (ref 35–48)
HCT VFR BLD AUTO: 33.8 % (ref 35–48)
HCT VFR BLD AUTO: 34 % (ref 35–48)
HCT VFR BLD AUTO: 34.4 % (ref 35–48)
HCT VFR BLD AUTO: 36.9 % (ref 35–48)
HCT VFR BLD AUTO: 37 % (ref 35–48)
HCT VFR BLD AUTO: 37.6 % (ref 35–48)
HCT VFR BLD AUTO: 38.5 % (ref 35–48)
HCT VFR BLD AUTO: 39.5 % (ref 35–48)
HDLC SERPL-MCNC: 64 MG/DL (ref 40–59)
HGB BLD-MCNC: 10.6 G/DL (ref 12–16)
HGB BLD-MCNC: 10.8 G/DL (ref 12–16)
HGB BLD-MCNC: 10.8 G/DL (ref 12–16)
HGB BLD-MCNC: 11.7 G/DL (ref 12–16)
HGB BLD-MCNC: 11.8 G/DL (ref 12–16)
HGB BLD-MCNC: 11.8 G/DL (ref 12–16)
HGB BLD-MCNC: 12 G/DL (ref 12–16)
HGB BLD-MCNC: 12.3 G/DL (ref 12–16)
HGB BLD-MCNC: 8.3 G/DL (ref 12–16)
IMM GRANULOCYTES # BLD AUTO: 0.01 X10(3) UL (ref 0–1)
IMM GRANULOCYTES # BLD AUTO: 0.01 X10(3) UL (ref 0–1)
IMM GRANULOCYTES # BLD AUTO: 0.02 X10(3) UL (ref 0–1)
IMM GRANULOCYTES # BLD AUTO: 0.03 X10(3) UL (ref 0–1)
IMM GRANULOCYTES # BLD AUTO: 0.03 X10(3) UL (ref 0–1)
IMM GRANULOCYTES # BLD AUTO: 0.04 X10(3) UL (ref 0–1)
IMM GRANULOCYTES # BLD AUTO: 0.08 X10(3) UL (ref 0–1)
IMM GRANULOCYTES NFR BLD: 0.2 %
IMM GRANULOCYTES NFR BLD: 0.2 %
IMM GRANULOCYTES NFR BLD: 0.3 %
IMM GRANULOCYTES NFR BLD: 0.3 %
IMM GRANULOCYTES NFR BLD: 0.4 %
IMM GRANULOCYTES NFR BLD: 0.4 %
IMM GRANULOCYTES NFR BLD: 0.5 %
IMM GRANULOCYTES NFR BLD: 0.6 %
IMM GRANULOCYTES NFR BLD: 0.8 %
INR BLD: 0.95 (ref 0.9–1.1)
INR BLD: 0.95 (ref 0.9–1.1)
KETONES UR STRIP.AUTO-MCNC: NEGATIVE MG/DL
LDLC SERPL CALC-MCNC: 147 MG/DL (ref ?–100)
LYMPHOCYTES # BLD AUTO: 1.02 X10(3) UL (ref 1–4)
LYMPHOCYTES # BLD AUTO: 1.06 X10(3) UL (ref 1–4)
LYMPHOCYTES # BLD AUTO: 1.08 X10(3) UL (ref 1–4)
LYMPHOCYTES # BLD AUTO: 1.12 X10(3) UL (ref 1–4)
LYMPHOCYTES # BLD AUTO: 1.17 X10(3) UL (ref 1–4)
LYMPHOCYTES # BLD AUTO: 1.18 X10(3) UL (ref 1–4)
LYMPHOCYTES # BLD AUTO: 1.38 X10(3) UL (ref 1–4)
LYMPHOCYTES # BLD AUTO: 1.42 X10(3) UL (ref 1–4)
LYMPHOCYTES # BLD AUTO: 1.52 X10(3) UL (ref 1–4)
LYMPHOCYTES NFR BLD AUTO: 14.7 %
LYMPHOCYTES NFR BLD AUTO: 19 %
LYMPHOCYTES NFR BLD AUTO: 19.5 %
LYMPHOCYTES NFR BLD AUTO: 20.2 %
LYMPHOCYTES NFR BLD AUTO: 21.4 %
LYMPHOCYTES NFR BLD AUTO: 23.5 %
LYMPHOCYTES NFR BLD AUTO: 25.3 %
LYMPHOCYTES NFR BLD AUTO: 26.6 %
LYMPHOCYTES NFR BLD AUTO: 8.2 %
M PROTEIN MFR SERPL ELPH: 6.8 G/DL (ref 6.4–8.2)
M PROTEIN MFR SERPL ELPH: 6.9 G/DL (ref 6.4–8.2)
M PROTEIN MFR SERPL ELPH: 6.9 G/DL (ref 6.4–8.2)
M PROTEIN MFR SERPL ELPH: 7 G/DL (ref 6.4–8.2)
M PROTEIN MFR SERPL ELPH: 7.1 G/DL (ref 6.4–8.2)
M PROTEIN MFR SERPL ELPH: 7.3 G/DL (ref 6.4–8.2)
M PROTEIN MFR SERPL ELPH: 7.6 G/DL (ref 6.4–8.2)
MCH RBC QN AUTO: 23.7 PG (ref 26–34)
MCH RBC QN AUTO: 26.9 PG (ref 26–34)
MCH RBC QN AUTO: 27.1 PG (ref 26–34)
MCH RBC QN AUTO: 27.1 PG (ref 26–34)
MCH RBC QN AUTO: 27.2 PG (ref 26–34)
MCH RBC QN AUTO: 27.7 PG (ref 26–34)
MCH RBC QN AUTO: 28 PG (ref 26–34)
MCHC RBC AUTO-ENTMCNC: 30.7 G/DL (ref 31–37)
MCHC RBC AUTO-ENTMCNC: 31.1 G/DL (ref 31–37)
MCHC RBC AUTO-ENTMCNC: 31.2 G/DL (ref 31–37)
MCHC RBC AUTO-ENTMCNC: 31.4 G/DL (ref 31–37)
MCHC RBC AUTO-ENTMCNC: 31.7 G/DL (ref 31–37)
MCHC RBC AUTO-ENTMCNC: 31.8 G/DL (ref 31–37)
MCHC RBC AUTO-ENTMCNC: 31.9 G/DL (ref 31–37)
MCV RBC AUTO: 77.1 FL (ref 80–100)
MCV RBC AUTO: 85.3 FL (ref 80–100)
MCV RBC AUTO: 85.4 FL (ref 80–100)
MCV RBC AUTO: 85.8 FL (ref 80–100)
MCV RBC AUTO: 85.8 FL (ref 80–100)
MCV RBC AUTO: 86.2 FL (ref 80–100)
MCV RBC AUTO: 86.6 FL (ref 80–100)
MCV RBC AUTO: 89 FL (ref 80–100)
MCV RBC AUTO: 89.7 FL (ref 80–100)
MONOCYTES # BLD AUTO: 0.37 X10(3) UL (ref 0.1–1)
MONOCYTES # BLD AUTO: 0.46 X10(3) UL (ref 0.1–1)
MONOCYTES # BLD AUTO: 0.47 X10(3) UL (ref 0.1–1)
MONOCYTES # BLD AUTO: 0.62 X10(3) UL (ref 0.1–1)
MONOCYTES # BLD AUTO: 0.65 X10(3) UL (ref 0.1–1)
MONOCYTES # BLD AUTO: 0.74 X10(3) UL (ref 0.1–1)
MONOCYTES # BLD AUTO: 0.76 X10(3) UL (ref 0.1–1)
MONOCYTES # BLD AUTO: 0.99 X10(3) UL (ref 0.1–1)
MONOCYTES # BLD AUTO: 1.58 X10(3) UL (ref 0.1–1)
MONOCYTES NFR BLD AUTO: 10.7 %
MONOCYTES NFR BLD AUTO: 11.1 %
MONOCYTES NFR BLD AUTO: 11.5 %
MONOCYTES NFR BLD AUTO: 12.3 %
MONOCYTES NFR BLD AUTO: 13.5 %
MONOCYTES NFR BLD AUTO: 13.8 %
MONOCYTES NFR BLD AUTO: 6.5 %
MONOCYTES NFR BLD AUTO: 8.1 %
MONOCYTES NFR BLD AUTO: 9.9 %
NEUTROPHILS # BLD AUTO: 11.33 X10 (3) UL (ref 1.5–7.7)
NEUTROPHILS # BLD AUTO: 11.33 X10(3) UL (ref 1.5–7.7)
NEUTROPHILS # BLD AUTO: 3.01 X10 (3) UL (ref 1.5–7.7)
NEUTROPHILS # BLD AUTO: 3.01 X10(3) UL (ref 1.5–7.7)
NEUTROPHILS # BLD AUTO: 3.27 X10 (3) UL (ref 1.5–7.7)
NEUTROPHILS # BLD AUTO: 3.27 X10(3) UL (ref 1.5–7.7)
NEUTROPHILS # BLD AUTO: 3.31 X10 (3) UL (ref 1.5–7.7)
NEUTROPHILS # BLD AUTO: 3.31 X10(3) UL (ref 1.5–7.7)
NEUTROPHILS # BLD AUTO: 3.5 X10 (3) UL (ref 1.5–7.7)
NEUTROPHILS # BLD AUTO: 3.5 X10(3) UL (ref 1.5–7.7)
NEUTROPHILS # BLD AUTO: 4.06 X10 (3) UL (ref 1.5–7.7)
NEUTROPHILS # BLD AUTO: 4.06 X10(3) UL (ref 1.5–7.7)
NEUTROPHILS # BLD AUTO: 4.1 X10 (3) UL (ref 1.5–7.7)
NEUTROPHILS # BLD AUTO: 4.1 X10(3) UL (ref 1.5–7.7)
NEUTROPHILS # BLD AUTO: 4.18 X10 (3) UL (ref 1.5–7.7)
NEUTROPHILS # BLD AUTO: 4.18 X10(3) UL (ref 1.5–7.7)
NEUTROPHILS # BLD AUTO: 5.02 X10 (3) UL (ref 1.5–7.7)
NEUTROPHILS # BLD AUTO: 5.02 X10(3) UL (ref 1.5–7.7)
NEUTROPHILS NFR BLD AUTO: 58.8 %
NEUTROPHILS NFR BLD AUTO: 61.3 %
NEUTROPHILS NFR BLD AUTO: 63.1 %
NEUTROPHILS NFR BLD AUTO: 64.6 %
NEUTROPHILS NFR BLD AUTO: 64.7 %
NEUTROPHILS NFR BLD AUTO: 67.8 %
NEUTROPHILS NFR BLD AUTO: 69.9 %
NEUTROPHILS NFR BLD AUTO: 71.6 %
NEUTROPHILS NFR BLD AUTO: 79.7 %
NITRITE UR QL STRIP.AUTO: NEGATIVE
NONHDLC SERPL-MCNC: 162 MG/DL (ref ?–130)
OSMOLALITY SERPL CALC.SUM OF ELEC: 273 MOSM/KG (ref 275–295)
OSMOLALITY SERPL CALC.SUM OF ELEC: 283 MOSM/KG (ref 275–295)
OSMOLALITY SERPL CALC.SUM OF ELEC: 285 MOSM/KG (ref 275–295)
OSMOLALITY SERPL CALC.SUM OF ELEC: 286 MOSM/KG (ref 275–295)
OSMOLALITY SERPL CALC.SUM OF ELEC: 288 MOSM/KG (ref 275–295)
OSMOLALITY SERPL CALC.SUM OF ELEC: 288 MOSM/KG (ref 275–295)
OSMOLALITY SERPL CALC.SUM OF ELEC: 290 MOSM/KG (ref 275–295)
OSMOLALITY SERPL CALC.SUM OF ELEC: 291 MOSM/KG (ref 275–295)
OSMOLALITY SERPL CALC.SUM OF ELEC: 292 MOSM/KG (ref 275–295)
P AXIS: 43 DEGREES
P-R INTERVAL: 180 MS
PH UR STRIP.AUTO: 6 [PH] (ref 4.5–8)
PLATELET # BLD AUTO: 138 10(3)UL (ref 150–450)
PLATELET # BLD AUTO: 141 10(3)UL (ref 150–450)
PLATELET # BLD AUTO: 146 10(3)UL (ref 150–450)
PLATELET # BLD AUTO: 161 10(3)UL (ref 150–450)
PLATELET # BLD AUTO: 178 10(3)UL (ref 150–450)
PLATELET # BLD AUTO: 202 10(3)UL (ref 150–450)
PLATELET # BLD AUTO: 263 10(3)UL (ref 150–450)
PLATELET # BLD AUTO: 307 10(3)UL (ref 150–450)
PLATELET # BLD AUTO: 344 10(3)UL (ref 150–450)
POCT INFLUENZA A: NEGATIVE
POCT INFLUENZA B: NEGATIVE
POTASSIUM SERPL-SCNC: 3.5 MMOL/L (ref 3.5–5.1)
POTASSIUM SERPL-SCNC: 3.7 MMOL/L (ref 3.5–5.1)
POTASSIUM SERPL-SCNC: 3.9 MMOL/L (ref 3.5–5.1)
POTASSIUM SERPL-SCNC: 4 MMOL/L (ref 3.5–5.1)
POTASSIUM SERPL-SCNC: 4 MMOL/L (ref 3.5–5.1)
POTASSIUM SERPL-SCNC: 4.1 MMOL/L (ref 3.5–5.1)
POTASSIUM SERPL-SCNC: 4.1 MMOL/L (ref 3.5–5.1)
PROT UR STRIP.AUTO-MCNC: NEGATIVE MG/DL
PSA SERPL DL<=0.01 NG/ML-MCNC: 13 SECONDS (ref 12.5–14.7)
PSA SERPL DL<=0.01 NG/ML-MCNC: 13.1 SECONDS (ref 12.5–14.7)
Q-T INTERVAL: 422 MS
QRS DURATION: 90 MS
QTC CALCULATION (BEZET): 455 MS
R AXIS: 2 DEGREES
RBC # BLD AUTO: 3.5 X10(6)UL (ref 3.8–5.3)
RBC # BLD AUTO: 3.94 X10(6)UL (ref 3.8–5.3)
RBC # BLD AUTO: 3.98 X10(6)UL (ref 3.8–5.3)
RBC # BLD AUTO: 3.99 X10(6)UL (ref 3.8–5.3)
RBC # BLD AUTO: 4.29 X10(6)UL (ref 3.8–5.3)
RBC # BLD AUTO: 4.3 X10(6)UL (ref 3.8–5.3)
RBC # BLD AUTO: 4.34 X10(6)UL (ref 3.8–5.3)
RBC # BLD AUTO: 4.34 X10(6)UL (ref 3.8–5.3)
RBC # BLD AUTO: 4.44 X10(6)UL (ref 3.8–5.3)
RBC UR QL AUTO: NEGATIVE
SODIUM SERPL-SCNC: 132 MMOL/L (ref 136–145)
SODIUM SERPL-SCNC: 137 MMOL/L (ref 136–145)
SODIUM SERPL-SCNC: 137 MMOL/L (ref 136–145)
SODIUM SERPL-SCNC: 138 MMOL/L (ref 136–145)
SODIUM SERPL-SCNC: 138 MMOL/L (ref 136–145)
SODIUM SERPL-SCNC: 139 MMOL/L (ref 136–145)
SODIUM SERPL-SCNC: 139 MMOL/L (ref 136–145)
SODIUM SERPL-SCNC: 140 MMOL/L (ref 136–145)
SODIUM SERPL-SCNC: 141 MMOL/L (ref 136–145)
SP GR UR STRIP.AUTO: 1.02 (ref 1–1.03)
T AXIS: 29 DEGREES
TRIGL SERPL-MCNC: 76 MG/DL (ref 30–149)
UROBILINOGEN UR STRIP.AUTO-MCNC: 1 MG/DL
VENTRICULAR RATE: 70 BPM
VLDLC SERPL CALC-MCNC: 15 MG/DL (ref 0–30)
WBC # BLD AUTO: 14.2 X10(3) UL (ref 4–11)
WBC # BLD AUTO: 4.8 X10(3) UL (ref 4–11)
WBC # BLD AUTO: 5.1 X10(3) UL (ref 4–11)
WBC # BLD AUTO: 5.6 X10(3) UL (ref 4–11)
WBC # BLD AUTO: 5.7 X10(3) UL (ref 4–11)
WBC # BLD AUTO: 5.7 X10(3) UL (ref 4–11)
WBC # BLD AUTO: 6.1 X10(3) UL (ref 4–11)
WBC # BLD AUTO: 6.5 X10(3) UL (ref 4–11)
WBC # BLD AUTO: 7.2 X10(3) UL (ref 4–11)

## 2019-01-01 PROCEDURE — 87086 URINE CULTURE/COLONY COUNT: CPT | Performed by: EMERGENCY MEDICINE

## 2019-01-01 PROCEDURE — 80053 COMPREHEN METABOLIC PANEL: CPT | Performed by: EMERGENCY MEDICINE

## 2019-01-01 PROCEDURE — 96417 CHEMO IV INFUS EACH ADDL SEQ: CPT

## 2019-01-01 PROCEDURE — 81001 URINALYSIS AUTO W/SCOPE: CPT | Performed by: EMERGENCY MEDICINE

## 2019-01-01 PROCEDURE — 96360 HYDRATION IV INFUSION INIT: CPT

## 2019-01-01 PROCEDURE — 96413 CHEMO IV INFUSION 1 HR: CPT

## 2019-01-01 PROCEDURE — 99214 OFFICE O/P EST MOD 30 MIN: CPT | Performed by: OTHER

## 2019-01-01 PROCEDURE — 99214 OFFICE O/P EST MOD 30 MIN: CPT | Performed by: INTERNAL MEDICINE

## 2019-01-01 PROCEDURE — 96361 HYDRATE IV INFUSION ADD-ON: CPT

## 2019-01-01 PROCEDURE — 70549 MR ANGIOGRAPH NECK W/O&W/DYE: CPT | Performed by: NURSE PRACTITIONER

## 2019-01-01 PROCEDURE — 85025 COMPLETE CBC W/AUTO DIFF WBC: CPT

## 2019-01-01 PROCEDURE — 70450 CT HEAD/BRAIN W/O DYE: CPT | Performed by: EMERGENCY MEDICINE

## 2019-01-01 PROCEDURE — 82378 CARCINOEMBRYONIC ANTIGEN: CPT

## 2019-01-01 PROCEDURE — 80053 COMPREHEN METABOLIC PANEL: CPT

## 2019-01-01 PROCEDURE — 71045 X-RAY EXAM CHEST 1 VIEW: CPT | Performed by: INTERNAL MEDICINE

## 2019-01-01 PROCEDURE — 71260 CT THORAX DX C+: CPT | Performed by: INTERNAL MEDICINE

## 2019-01-01 PROCEDURE — 36415 COLL VENOUS BLD VENIPUNCTURE: CPT

## 2019-01-01 PROCEDURE — 36591 DRAW BLOOD OFF VENOUS DEVICE: CPT

## 2019-01-01 PROCEDURE — 96375 TX/PRO/DX INJ NEW DRUG ADDON: CPT

## 2019-01-01 PROCEDURE — 85025 COMPLETE CBC W/AUTO DIFF WBC: CPT | Performed by: EMERGENCY MEDICINE

## 2019-01-01 PROCEDURE — G9678 ONCOLOGY CARE MODEL SERVICE: HCPCS | Performed by: INTERNAL MEDICINE

## 2019-01-01 PROCEDURE — 74177 CT ABD & PELVIS W/CONTRAST: CPT | Performed by: INTERNAL MEDICINE

## 2019-01-01 PROCEDURE — 96523 IRRIG DRUG DELIVERY DEVICE: CPT

## 2019-01-01 PROCEDURE — 99232 SBSQ HOSP IP/OBS MODERATE 35: CPT | Performed by: HOSPITALIST

## 2019-01-01 PROCEDURE — 99232 SBSQ HOSP IP/OBS MODERATE 35: CPT | Performed by: INTERNAL MEDICINE

## 2019-01-01 PROCEDURE — 99203 OFFICE O/P NEW LOW 30 MIN: CPT | Performed by: FAMILY MEDICINE

## 2019-01-01 PROCEDURE — 71046 X-RAY EXAM CHEST 2 VIEWS: CPT | Performed by: EMERGENCY MEDICINE

## 2019-01-01 PROCEDURE — 83550 IRON BINDING TEST: CPT

## 2019-01-01 PROCEDURE — 71046 X-RAY EXAM CHEST 2 VIEWS: CPT | Performed by: FAMILY MEDICINE

## 2019-01-01 PROCEDURE — 85610 PROTHROMBIN TIME: CPT | Performed by: EMERGENCY MEDICINE

## 2019-01-01 PROCEDURE — 70546 MR ANGIOGRAPH HEAD W/O&W/DYE: CPT | Performed by: NURSE PRACTITIONER

## 2019-01-01 PROCEDURE — 82728 ASSAY OF FERRITIN: CPT

## 2019-01-01 PROCEDURE — 99215 OFFICE O/P EST HI 40 MIN: CPT | Performed by: CLINICAL NURSE SPECIALIST

## 2019-01-01 PROCEDURE — 99217 OBSERVATION CARE DISCHARGE: CPT | Performed by: INTERNAL MEDICINE

## 2019-01-01 PROCEDURE — 93880 EXTRACRANIAL BILAT STUDY: CPT | Performed by: OTHER

## 2019-01-01 PROCEDURE — 99284 EMERGENCY DEPT VISIT MOD MDM: CPT

## 2019-01-01 PROCEDURE — 99283 EMERGENCY DEPT VISIT LOW MDM: CPT

## 2019-01-01 PROCEDURE — 82565 ASSAY OF CREATININE: CPT

## 2019-01-01 PROCEDURE — 96367 TX/PROPH/DG ADDL SEQ IV INF: CPT

## 2019-01-01 PROCEDURE — 96365 THER/PROPH/DIAG IV INF INIT: CPT

## 2019-01-01 PROCEDURE — 70553 MRI BRAIN STEM W/O & W/DYE: CPT | Performed by: NURSE PRACTITIONER

## 2019-01-01 PROCEDURE — 96415 CHEMO IV INFUSION ADDL HR: CPT

## 2019-01-01 PROCEDURE — 99214 OFFICE O/P EST MOD 30 MIN: CPT | Performed by: NURSE PRACTITIONER

## 2019-01-01 PROCEDURE — 99239 HOSP IP/OBS DSCHRG MGMT >30: CPT | Performed by: HOSPITALIST

## 2019-01-01 PROCEDURE — 93306 TTE W/DOPPLER COMPLETE: CPT | Performed by: HOSPITALIST

## 2019-01-01 PROCEDURE — 99225 SUBSEQUENT OBSERVATION CARE: CPT | Performed by: INTERNAL MEDICINE

## 2019-01-01 PROCEDURE — 99204 OFFICE O/P NEW MOD 45 MIN: CPT | Performed by: OTHER

## 2019-01-01 PROCEDURE — 96376 TX/PRO/DX INJ SAME DRUG ADON: CPT

## 2019-01-01 PROCEDURE — 99285 EMERGENCY DEPT VISIT HI MDM: CPT

## 2019-01-01 PROCEDURE — 93306 TTE W/DOPPLER COMPLETE: CPT | Performed by: NURSE PRACTITIONER

## 2019-01-01 PROCEDURE — 85730 THROMBOPLASTIN TIME PARTIAL: CPT | Performed by: EMERGENCY MEDICINE

## 2019-01-01 PROCEDURE — 83540 ASSAY OF IRON: CPT

## 2019-01-01 PROCEDURE — 99220 INITIAL OBSERVATION CARE,LEVL III: CPT | Performed by: HOSPITALIST

## 2019-01-01 PROCEDURE — 99214 OFFICE O/P EST MOD 30 MIN: CPT | Performed by: CLINICAL NURSE SPECIALIST

## 2019-01-01 PROCEDURE — 99223 1ST HOSP IP/OBS HIGH 75: CPT | Performed by: SPECIALIST

## 2019-01-01 PROCEDURE — 99214 OFFICE O/P EST MOD 30 MIN: CPT | Performed by: FAMILY MEDICINE

## 2019-01-01 PROCEDURE — 87502 INFLUENZA DNA AMP PROBE: CPT | Performed by: EMERGENCY MEDICINE

## 2019-01-01 RX ORDER — ACETAMINOPHEN 325 MG/1
650 TABLET ORAL ONCE
Status: CANCELLED | OUTPATIENT
Start: 2019-01-01

## 2019-01-01 RX ORDER — SODIUM CHLORIDE 0.9 % (FLUSH) 0.9 %
10 SYRINGE (ML) INJECTION ONCE
Status: COMPLETED | OUTPATIENT
Start: 2019-01-01 | End: 2019-01-01

## 2019-01-01 RX ORDER — ATROPINE SULFATE 0.4 MG/ML
0.2 AMPUL (ML) INJECTION ONCE
Status: COMPLETED | OUTPATIENT
Start: 2019-01-01 | End: 2019-01-01

## 2019-01-01 RX ORDER — BENZONATATE 200 MG/1
200 CAPSULE ORAL 3 TIMES DAILY PRN
Status: DISCONTINUED | OUTPATIENT
Start: 2019-01-01 | End: 2019-01-01

## 2019-01-01 RX ORDER — ATROPINE SULFATE 0.4 MG/ML
0.2 AMPUL (ML) INJECTION ONCE
Status: CANCELLED | OUTPATIENT
Start: 2019-01-01

## 2019-01-01 RX ORDER — ACETAMINOPHEN 325 MG/1
650 TABLET ORAL ONCE
Status: COMPLETED | OUTPATIENT
Start: 2019-01-01 | End: 2019-01-01

## 2019-01-01 RX ORDER — SODIUM CHLORIDE 9 MG/ML
125 INJECTION, SOLUTION INTRAVENOUS CONTINUOUS
Status: DISCONTINUED | OUTPATIENT
Start: 2019-01-01 | End: 2019-01-01

## 2019-01-01 RX ORDER — DIPHENHYDRAMINE HCL 25 MG
50 CAPSULE ORAL ONCE
Status: DISCONTINUED | OUTPATIENT
Start: 2019-01-01 | End: 2019-01-01

## 2019-01-01 RX ORDER — ASPIRIN 81 MG/1
81 TABLET ORAL DAILY
Qty: 30 TABLET | Refills: 2 | Status: ON HOLD | OUTPATIENT
Start: 2019-01-01 | End: 2020-01-01

## 2019-01-01 RX ORDER — DIPHENHYDRAMINE HCL 50 MG
50 CAPSULE ORAL ONCE
Status: CANCELLED | OUTPATIENT
Start: 2019-01-01

## 2019-01-01 RX ORDER — FAMOTIDINE 20 MG/1
20 TABLET ORAL DAILY
Status: DISCONTINUED | OUTPATIENT
Start: 2019-01-01 | End: 2019-01-01

## 2019-01-01 RX ORDER — CLINDAMYCIN PHOSPHATE 10 MG/G
1 GEL TOPICAL 2 TIMES DAILY
Qty: 1 BOTTLE | Refills: 0 | Status: SHIPPED | OUTPATIENT
Start: 2019-01-01 | End: 2019-01-01 | Stop reason: ALTCHOICE

## 2019-01-01 RX ORDER — POLYETHYLENE GLYCOL 3350 17 G/17G
17 POWDER, FOR SOLUTION ORAL DAILY PRN
Status: DISCONTINUED | OUTPATIENT
Start: 2019-01-01 | End: 2019-01-01

## 2019-01-01 RX ORDER — SODIUM PHOSPHATE, DIBASIC AND SODIUM PHOSPHATE, MONOBASIC 7; 19 G/133ML; G/133ML
1 ENEMA RECTAL ONCE AS NEEDED
Status: DISCONTINUED | OUTPATIENT
Start: 2019-01-01 | End: 2019-01-01

## 2019-01-01 RX ORDER — CLOPIDOGREL BISULFATE 75 MG/1
75 TABLET ORAL DAILY
Status: DISCONTINUED | OUTPATIENT
Start: 2019-01-01 | End: 2019-01-01

## 2019-01-01 RX ORDER — FAMOTIDINE 10 MG/ML
20 INJECTION, SOLUTION INTRAVENOUS DAILY
Status: DISCONTINUED | OUTPATIENT
Start: 2019-01-01 | End: 2019-01-01

## 2019-01-01 RX ORDER — BENZONATATE 100 MG/1
100 CAPSULE ORAL 3 TIMES DAILY PRN
Qty: 30 CAPSULE | Refills: 0 | Status: SHIPPED | OUTPATIENT
Start: 2019-01-01 | End: 2019-01-01

## 2019-01-01 RX ORDER — ACETAMINOPHEN 325 MG/1
650 TABLET ORAL EVERY 4 HOURS PRN
Status: DISCONTINUED | OUTPATIENT
Start: 2019-01-01 | End: 2019-01-01

## 2019-01-01 RX ORDER — MORPHINE SULFATE 4 MG/ML
2 INJECTION, SOLUTION INTRAMUSCULAR; INTRAVENOUS EVERY 2 HOUR PRN
Status: DISCONTINUED | OUTPATIENT
Start: 2019-01-01 | End: 2019-01-01

## 2019-01-01 RX ORDER — MULTIVITAMIN WITH FOLIC ACID 400 MCG
1 TABLET ORAL DAILY
COMMUNITY

## 2019-01-01 RX ORDER — ASPIRIN 325 MG
325 TABLET, DELAYED RELEASE (ENTERIC COATED) ORAL ONCE
Status: COMPLETED | OUTPATIENT
Start: 2019-01-01 | End: 2019-01-01

## 2019-01-01 RX ORDER — SODIUM CHLORIDE 0.9 % (FLUSH) 0.9 %
10 SYRINGE (ML) INJECTION ONCE
Status: CANCELLED | OUTPATIENT
Start: 2019-01-01

## 2019-01-01 RX ORDER — ACETAMINOPHEN 500 MG
500 TABLET ORAL EVERY 6 HOURS PRN
Status: DISCONTINUED | OUTPATIENT
Start: 2019-01-01 | End: 2019-01-01

## 2019-01-01 RX ORDER — BISACODYL 10 MG
10 SUPPOSITORY, RECTAL RECTAL
Status: DISCONTINUED | OUTPATIENT
Start: 2019-01-01 | End: 2019-01-01

## 2019-01-01 RX ORDER — ASPIRIN 325 MG
325 TABLET ORAL DAILY
Status: DISCONTINUED | OUTPATIENT
Start: 2019-01-01 | End: 2019-01-01

## 2019-01-01 RX ORDER — SODIUM CHLORIDE 9 MG/ML
1000 INJECTION, SOLUTION INTRAVENOUS ONCE
Status: COMPLETED | OUTPATIENT
Start: 2019-01-01 | End: 2019-01-01

## 2019-01-01 RX ORDER — TORSEMIDE 10 MG/1
10 TABLET ORAL DAILY
Qty: 30 TABLET | Refills: 0 | Status: SHIPPED | OUTPATIENT
Start: 2019-01-01 | End: 2019-01-01

## 2019-01-01 RX ORDER — DIPHENHYDRAMINE HCL 25 MG
50 CAPSULE ORAL ONCE
Status: CANCELLED | OUTPATIENT
Start: 2019-01-01

## 2019-01-01 RX ORDER — POTASSIUM CHLORIDE 14.9 MG/ML
20 INJECTION INTRAVENOUS ONCE
Status: DISCONTINUED | OUTPATIENT
Start: 2019-01-01 | End: 2019-01-01

## 2019-01-01 RX ORDER — ATORVASTATIN CALCIUM 80 MG/1
80 TABLET, FILM COATED ORAL NIGHTLY
Status: DISCONTINUED | OUTPATIENT
Start: 2019-01-01 | End: 2019-01-01

## 2019-01-01 RX ORDER — ONDANSETRON 2 MG/ML
4 INJECTION INTRAMUSCULAR; INTRAVENOUS EVERY 6 HOURS PRN
Status: DISCONTINUED | OUTPATIENT
Start: 2019-01-01 | End: 2019-01-01

## 2019-01-01 RX ORDER — METOCLOPRAMIDE HYDROCHLORIDE 5 MG/ML
10 INJECTION INTRAMUSCULAR; INTRAVENOUS EVERY 8 HOURS PRN
Status: DISCONTINUED | OUTPATIENT
Start: 2019-01-01 | End: 2019-01-01

## 2019-01-01 RX ORDER — BENZONATATE 200 MG/1
200 CAPSULE ORAL 3 TIMES DAILY PRN
Qty: 90 CAPSULE | Refills: 1 | Status: SHIPPED | OUTPATIENT
Start: 2019-01-01 | End: 2019-01-01 | Stop reason: ALTCHOICE

## 2019-01-01 RX ORDER — PROCHLORPERAZINE MALEATE 10 MG
10 TABLET ORAL EVERY 6 HOURS PRN
Qty: 30 TABLET | Refills: 3 | Status: SHIPPED | OUTPATIENT
Start: 2019-01-01

## 2019-01-01 RX ORDER — LABETALOL HYDROCHLORIDE 5 MG/ML
10 INJECTION, SOLUTION INTRAVENOUS EVERY 10 MIN PRN
Status: DISCONTINUED | OUTPATIENT
Start: 2019-01-01 | End: 2019-01-01

## 2019-01-01 RX ORDER — AZITHROMYCIN 250 MG/1
TABLET, FILM COATED ORAL
Qty: 6 TABLET | Refills: 0 | Status: SHIPPED | OUTPATIENT
Start: 2019-01-01 | End: 2019-01-01

## 2019-01-01 RX ORDER — ACETAMINOPHEN 650 MG/1
650 SUPPOSITORY RECTAL EVERY 4 HOURS PRN
Status: DISCONTINUED | OUTPATIENT
Start: 2019-01-01 | End: 2019-01-01

## 2019-01-01 RX ORDER — AMOXICILLIN AND CLAVULANATE POTASSIUM 875; 125 MG/1; MG/1
1 TABLET, FILM COATED ORAL 2 TIMES DAILY
Qty: 10 TABLET | Refills: 0 | Status: SHIPPED | OUTPATIENT
Start: 2019-01-01 | End: 2019-01-01

## 2019-01-01 RX ORDER — ATROPINE SULFATE 0.4 MG/ML
0.2 AMPUL (ML) INJECTION ONCE
Status: DISCONTINUED | OUTPATIENT
Start: 2019-01-01 | End: 2019-01-01

## 2019-01-01 RX ORDER — CLOPIDOGREL BISULFATE 75 MG/1
75 TABLET ORAL DAILY
Qty: 20 TABLET | Refills: 0 | Status: SHIPPED | OUTPATIENT
Start: 2019-01-01 | End: 2019-01-01 | Stop reason: ALTCHOICE

## 2019-01-01 RX ORDER — SODIUM CHLORIDE 9 MG/ML
INJECTION, SOLUTION INTRAVENOUS CONTINUOUS
Status: DISCONTINUED | OUTPATIENT
Start: 2019-01-01 | End: 2019-01-01

## 2019-01-01 RX ORDER — SODIUM CHLORIDE 9 MG/ML
1000 INJECTION, SOLUTION INTRAVENOUS ONCE
Status: CANCELLED
Start: 2019-01-01

## 2019-01-01 RX ORDER — DOCUSATE SODIUM 100 MG/1
100 CAPSULE, LIQUID FILLED ORAL 2 TIMES DAILY
Status: DISCONTINUED | OUTPATIENT
Start: 2019-01-01 | End: 2019-01-01

## 2019-01-01 RX ORDER — DIPHENHYDRAMINE HCL 25 MG
50 CAPSULE ORAL ONCE
Status: COMPLETED | OUTPATIENT
Start: 2019-01-01 | End: 2019-01-01

## 2019-01-01 RX ORDER — ASCORBIC ACID 500 MG
500 TABLET ORAL DAILY
COMMUNITY

## 2019-01-01 RX ORDER — POTASSIUM CHLORIDE 20 MEQ/1
40 TABLET, EXTENDED RELEASE ORAL EVERY 4 HOURS
Status: COMPLETED | OUTPATIENT
Start: 2019-01-01 | End: 2019-01-01

## 2019-01-01 RX ORDER — MORPHINE SULFATE 4 MG/ML
1 INJECTION, SOLUTION INTRAMUSCULAR; INTRAVENOUS EVERY 2 HOUR PRN
Status: DISCONTINUED | OUTPATIENT
Start: 2019-01-01 | End: 2019-01-01

## 2019-01-01 RX ORDER — ACETAMINOPHEN 500 MG
1000 TABLET ORAL EVERY 6 HOURS PRN
Status: DISCONTINUED | OUTPATIENT
Start: 2019-01-01 | End: 2019-01-01

## 2019-01-01 RX ORDER — ACETAMINOPHEN 500 MG
1000 TABLET ORAL ONCE
Status: COMPLETED | OUTPATIENT
Start: 2019-01-01 | End: 2019-01-01

## 2019-01-01 RX ORDER — SENNOSIDES 8.6 MG
17.2 TABLET ORAL NIGHTLY
Status: DISCONTINUED | OUTPATIENT
Start: 2019-01-01 | End: 2019-01-01

## 2019-01-01 RX ORDER — ASPIRIN 300 MG
300 SUPPOSITORY, RECTAL RECTAL DAILY
Status: DISCONTINUED | OUTPATIENT
Start: 2019-01-01 | End: 2019-01-01

## 2019-01-01 RX ORDER — PYRIDOXINE HCL (VITAMIN B6) 50 MG
1 TABLET ORAL DAILY
COMMUNITY

## 2019-01-01 RX ORDER — BENZONATATE 100 MG/1
100 CAPSULE ORAL 3 TIMES DAILY PRN
Status: DISCONTINUED | OUTPATIENT
Start: 2019-01-01 | End: 2019-01-01

## 2019-01-01 RX ORDER — ASPIRIN 81 MG/1
81 TABLET ORAL DAILY
Status: DISCONTINUED | OUTPATIENT
Start: 2019-01-01 | End: 2019-01-01

## 2019-01-01 RX ADMIN — SODIUM CHLORIDE 0.9 % (FLUSH) 10 ML: 0.9 % SYRINGE (ML) INJECTION at 13:40:00

## 2019-01-01 RX ADMIN — ATROPINE SULFATE 0.2 MG: 0.4 MG/ML AMPUL (ML) INJECTION at 15:03:00

## 2019-01-01 RX ADMIN — DIPHENHYDRAMINE HCL 50 MG: 25 MG CAPSULE ORAL at 13:03:00

## 2019-01-01 RX ADMIN — ACETAMINOPHEN 650 MG: 325 TABLET ORAL at 09:56:00

## 2019-01-01 RX ADMIN — SODIUM CHLORIDE 0.9 % (FLUSH) 10 ML: 0.9 % SYRINGE (ML) INJECTION at 13:55:00

## 2019-01-01 RX ADMIN — ACETAMINOPHEN 650 MG: 325 TABLET ORAL at 10:59:00

## 2019-01-01 RX ADMIN — DIPHENHYDRAMINE HCL 50 MG: 25 MG CAPSULE ORAL at 12:02:00

## 2019-01-01 RX ADMIN — SODIUM CHLORIDE 0.9 % (FLUSH) 10 ML: 0.9 % SYRINGE (ML) INJECTION at 15:20:00

## 2019-01-01 RX ADMIN — SODIUM CHLORIDE 0.9 % (FLUSH) 10 ML: 0.9 % SYRINGE (ML) INJECTION at 13:10:00

## 2019-01-01 RX ADMIN — ATROPINE SULFATE 0.2 MG: 0.4 MG/ML AMPUL (ML) INJECTION at 11:43:00

## 2019-01-01 RX ADMIN — ATROPINE SULFATE 0.2 MG: 0.4 MG/ML AMPUL (ML) INJECTION at 11:30:00

## 2019-01-01 RX ADMIN — ACETAMINOPHEN 650 MG: 325 TABLET ORAL at 10:26:00

## 2019-01-01 RX ADMIN — ATROPINE SULFATE 0.2 MG: 0.4 MG/ML AMPUL (ML) INJECTION at 13:43:00

## 2019-01-01 RX ADMIN — ATROPINE SULFATE 0.2 MG: 0.4 MG/ML AMPUL (ML) INJECTION at 12:14:00

## 2019-01-01 RX ADMIN — ACETAMINOPHEN 650 MG: 325 TABLET ORAL at 11:26:00

## 2019-01-01 RX ADMIN — ACETAMINOPHEN 650 MG: 325 TABLET ORAL at 10:29:00

## 2019-01-01 RX ADMIN — ATROPINE SULFATE 0.2 MG: 0.4 MG/ML AMPUL (ML) INJECTION at 12:06:00

## 2019-01-01 RX ADMIN — ACETAMINOPHEN 650 MG: 325 TABLET ORAL at 09:44:00

## 2019-01-01 RX ADMIN — ACETAMINOPHEN 650 MG: 325 TABLET ORAL at 11:39:00

## 2019-01-01 RX ADMIN — ACETAMINOPHEN 650 MG: 325 TABLET ORAL at 10:02:00

## 2019-01-01 RX ADMIN — ATROPINE SULFATE 0.2 MG: 0.4 MG/ML AMPUL (ML) INJECTION at 15:44:00

## 2019-01-01 RX ADMIN — ACETAMINOPHEN 650 MG: 325 TABLET ORAL at 10:23:00

## 2019-01-01 RX ADMIN — SODIUM CHLORIDE 0.9 % (FLUSH) 10 ML: 0.9 % SYRINGE (ML) INJECTION at 12:00:00

## 2019-01-01 RX ADMIN — ATROPINE SULFATE 0.2 MG: 0.4 MG/ML AMPUL (ML) INJECTION at 12:28:00

## 2019-01-01 RX ADMIN — ACETAMINOPHEN 650 MG: 325 TABLET ORAL at 09:53:00

## 2019-01-01 RX ADMIN — ATROPINE SULFATE 0.2 MG: 0.4 MG/ML AMPUL (ML) INJECTION at 11:33:00

## 2019-01-01 RX ADMIN — ATROPINE SULFATE 0.2 MG: 0.4 MG/ML AMPUL (ML) INJECTION at 13:06:00

## 2019-01-01 RX ADMIN — ATROPINE SULFATE 0.2 MG: 0.4 MG/ML AMPUL (ML) INJECTION at 12:39:00

## 2019-01-01 RX ADMIN — ATROPINE SULFATE 0.2 MG: 0.4 MG/ML AMPUL (ML) INJECTION at 12:23:00

## 2019-01-01 RX ADMIN — ACETAMINOPHEN 650 MG: 325 TABLET ORAL at 10:40:00

## 2019-01-01 RX ADMIN — ACETAMINOPHEN 650 MG: 325 TABLET ORAL at 10:21:00

## 2019-01-01 RX ADMIN — ACETAMINOPHEN 650 MG: 325 TABLET ORAL at 13:03:00

## 2019-01-01 RX ADMIN — ATROPINE SULFATE 0.2 MG: 0.4 MG/ML AMPUL (ML) INJECTION at 11:48:00

## 2019-01-01 RX ADMIN — ATROPINE SULFATE 0.2 MG: 0.4 MG/ML AMPUL (ML) INJECTION at 11:37:00

## 2019-01-01 RX ADMIN — ACETAMINOPHEN 650 MG: 325 TABLET ORAL at 12:02:00

## 2019-01-01 RX ADMIN — ATROPINE SULFATE 0.2 MG: 0.4 MG/ML AMPUL (ML) INJECTION at 11:49:00

## 2019-01-01 RX ADMIN — SODIUM CHLORIDE 1000 ML: 9 INJECTION, SOLUTION INTRAVENOUS at 12:10:00

## 2019-01-14 ENCOUNTER — HOSPITAL ENCOUNTER (OUTPATIENT)
Dept: CT IMAGING | Age: 74
Discharge: HOME | End: 2019-01-14
Attending: CLINICAL NURSE SPECIALIST
Payer: MEDICARE

## 2019-01-14 ENCOUNTER — TELEPHONE (OUTPATIENT)
Dept: HEMATOLOGY/ONCOLOGY | Facility: HOSPITAL | Age: 74
End: 2019-01-14

## 2019-01-14 DIAGNOSIS — C20 RECTAL CANCER (HCC): ICD-10-CM

## 2019-01-14 DIAGNOSIS — C78.7 SECONDARY CARCINOMA OF LIVER (HCC): ICD-10-CM

## 2019-01-14 LAB — CREAT SERPL-MCNC: 0.5 MG/DL (ref 0.55–1.02)

## 2019-01-14 PROCEDURE — 74177 CT ABD & PELVIS W/CONTRAST: CPT | Performed by: CLINICAL NURSE SPECIALIST

## 2019-01-14 PROCEDURE — 71260 CT THORAX DX C+: CPT | Performed by: CLINICAL NURSE SPECIALIST

## 2019-01-14 PROCEDURE — 82565 ASSAY OF CREATININE: CPT

## 2019-01-14 NOTE — TELEPHONE ENCOUNTER
Patient calling with complaints of increased pain in her fingers feet and legs up to knees that started over the past week. No energy. Feels dopey on the chemotherapy. /80 this morning on no medicine, just changed her diet.

## 2019-01-17 ENCOUNTER — OFFICE VISIT (OUTPATIENT)
Dept: HEMATOLOGY/ONCOLOGY | Age: 74
End: 2019-01-17
Attending: INTERNAL MEDICINE
Payer: MEDICARE

## 2019-01-17 VITALS
OXYGEN SATURATION: 99 % | TEMPERATURE: 99 F | BODY MASS INDEX: 19.91 KG/M2 | HEART RATE: 87 BPM | WEIGHT: 119.5 LBS | SYSTOLIC BLOOD PRESSURE: 116 MMHG | DIASTOLIC BLOOD PRESSURE: 77 MMHG | RESPIRATION RATE: 16 BRPM | HEIGHT: 65 IN

## 2019-01-17 DIAGNOSIS — C20 RECTAL CANCER (HCC): ICD-10-CM

## 2019-01-17 DIAGNOSIS — D50.9 IRON DEFICIENCY ANEMIA, UNSPECIFIED IRON DEFICIENCY ANEMIA TYPE: ICD-10-CM

## 2019-01-17 DIAGNOSIS — Z51.11 ENCOUNTER FOR CHEMOTHERAPY MANAGEMENT: ICD-10-CM

## 2019-01-17 DIAGNOSIS — C78.7 SECONDARY CARCINOMA OF LIVER (HCC): Primary | ICD-10-CM

## 2019-01-17 DIAGNOSIS — C78.7 SECONDARY CARCINOMA OF LIVER (HCC): ICD-10-CM

## 2019-01-17 DIAGNOSIS — I15.9 SECONDARY HYPERTENSION: ICD-10-CM

## 2019-01-17 DIAGNOSIS — K52.1 DIARRHEA DUE TO DRUG: ICD-10-CM

## 2019-01-17 DIAGNOSIS — G60.8 PERIPHERAL SENSORY NEUROPATHY: ICD-10-CM

## 2019-01-17 DIAGNOSIS — D69.6 THROMBOCYTOPENIA (HCC): Primary | ICD-10-CM

## 2019-01-17 LAB
ALBUMIN SERPL-MCNC: 2.3 G/DL (ref 3.1–4.5)
ALBUMIN/GLOB SERPL: 0.5 {RATIO} (ref 1–2)
ALP LIVER SERPL-CCNC: 403 U/L (ref 55–142)
ALT SERPL-CCNC: 49 U/L (ref 14–54)
ANION GAP SERPL CALC-SCNC: 7 MMOL/L (ref 0–18)
AST SERPL-CCNC: 81 U/L (ref 15–41)
BASOPHILS # BLD AUTO: 0.02 X10(3) UL (ref 0–0.1)
BASOPHILS NFR BLD AUTO: 0.4 %
BILIRUB SERPL-MCNC: 0.5 MG/DL (ref 0.1–2)
BUN BLD-MCNC: 9 MG/DL (ref 8–20)
BUN/CREAT SERPL: 17 (ref 10–20)
CALCIUM BLD-MCNC: 9 MG/DL (ref 8.3–10.3)
CEA SERPL-MCNC: 38.5 NG/ML (ref 0.5–5)
CHLORIDE SERPL-SCNC: 102 MMOL/L (ref 101–111)
CO2 SERPL-SCNC: 26 MMOL/L (ref 22–32)
CREAT BLD-MCNC: 0.53 MG/DL (ref 0.55–1.02)
EOSINOPHIL # BLD AUTO: 0.07 X10(3) UL (ref 0–0.3)
EOSINOPHIL NFR BLD AUTO: 1.3 %
ERYTHROCYTE [DISTWIDTH] IN BLOOD BY AUTOMATED COUNT: 14.3 % (ref 11.5–16)
GLOBULIN PLAS-MCNC: 4.8 G/DL (ref 2.8–4.4)
GLUCOSE BLD-MCNC: 113 MG/DL (ref 70–99)
HCT VFR BLD AUTO: 40 % (ref 34–50)
HGB BLD-MCNC: 12.8 G/DL (ref 12–16)
IMMATURE GRANULOCYTE COUNT: 0.01 X10(3) UL (ref 0–1)
IMMATURE GRANULOCYTE RATIO %: 0.2 %
LYMPHOCYTES # BLD AUTO: 1.09 X10(3) UL (ref 0.9–4)
LYMPHOCYTES NFR BLD AUTO: 20.5 %
M PROTEIN MFR SERPL ELPH: 7.1 G/DL (ref 6.4–8.2)
MCH RBC QN AUTO: 30.5 PG (ref 27–33.2)
MCHC RBC AUTO-ENTMCNC: 32 G/DL (ref 31–37)
MCV RBC AUTO: 95.5 FL (ref 81–100)
MONOCYTES # BLD AUTO: 0.87 X10(3) UL (ref 0.1–1)
MONOCYTES NFR BLD AUTO: 16.3 %
NEUTROPHIL ABS PRELIM: 3.27 X10 (3) UL (ref 1.3–6.7)
NEUTROPHILS # BLD AUTO: 3.27 X10(3) UL (ref 1.3–6.7)
NEUTROPHILS NFR BLD AUTO: 61.3 %
OSMOLALITY SERPL CALC.SUM OF ELEC: 279 MOSM/KG (ref 275–295)
PLATELET # BLD AUTO: 152 10(3)UL (ref 150–450)
POTASSIUM SERPL-SCNC: 4.2 MMOL/L (ref 3.6–5.1)
RBC # BLD AUTO: 4.19 X10(6)UL (ref 3.8–5.1)
RED CELL DISTRIBUTION WIDTH-SD: 49.9 FL (ref 35.1–46.3)
SODIUM SERPL-SCNC: 135 MMOL/L (ref 136–144)
WBC # BLD AUTO: 5.3 X10(3) UL (ref 4–13)

## 2019-01-17 PROCEDURE — 82378 CARCINOEMBRYONIC ANTIGEN: CPT

## 2019-01-17 PROCEDURE — 96411 CHEMO IV PUSH ADDL DRUG: CPT

## 2019-01-17 PROCEDURE — 99215 OFFICE O/P EST HI 40 MIN: CPT | Performed by: CLINICAL NURSE SPECIALIST

## 2019-01-17 PROCEDURE — 85025 COMPLETE CBC W/AUTO DIFF WBC: CPT

## 2019-01-17 PROCEDURE — 96413 CHEMO IV INFUSION 1 HR: CPT

## 2019-01-17 PROCEDURE — 96367 TX/PROPH/DG ADDL SEQ IV INF: CPT

## 2019-01-17 PROCEDURE — 80053 COMPREHEN METABOLIC PANEL: CPT

## 2019-01-17 PROCEDURE — 96375 TX/PRO/DX INJ NEW DRUG ADDON: CPT

## 2019-01-17 PROCEDURE — 96366 THER/PROPH/DIAG IV INF ADDON: CPT

## 2019-01-17 RX ORDER — FLUOROURACIL 50 MG/ML
2400 INJECTION, SOLUTION INTRAVENOUS CONTINUOUS
Status: CANCELLED | OUTPATIENT
Start: 2019-01-17

## 2019-01-17 RX ORDER — FLUOROURACIL 50 MG/ML
400 INJECTION, SOLUTION INTRAVENOUS ONCE
Status: CANCELLED | OUTPATIENT
Start: 2019-01-17

## 2019-01-17 RX ORDER — METOCLOPRAMIDE HYDROCHLORIDE 5 MG/ML
10 INJECTION INTRAMUSCULAR; INTRAVENOUS EVERY 6 HOURS PRN
Status: CANCELLED | OUTPATIENT
Start: 2019-01-17

## 2019-01-17 RX ORDER — LORAZEPAM 0.5 MG/1
TABLET ORAL EVERY 4 HOURS PRN
Status: CANCELLED | OUTPATIENT
Start: 2019-01-17

## 2019-01-17 RX ORDER — ONDANSETRON 2 MG/ML
8 INJECTION INTRAMUSCULAR; INTRAVENOUS EVERY 6 HOURS PRN
Status: CANCELLED | OUTPATIENT
Start: 2019-01-17

## 2019-01-17 RX ORDER — LORAZEPAM 2 MG/ML
INJECTION INTRAMUSCULAR EVERY 4 HOURS PRN
Status: CANCELLED | OUTPATIENT
Start: 2019-01-17

## 2019-01-17 RX ORDER — FLUOROURACIL 50 MG/ML
2400 INJECTION, SOLUTION INTRAVENOUS CONTINUOUS
Status: DISCONTINUED | OUTPATIENT
Start: 2019-01-17 | End: 2019-01-17

## 2019-01-17 RX ORDER — FLUOROURACIL 50 MG/ML
400 INJECTION, SOLUTION INTRAVENOUS ONCE
Status: COMPLETED | OUTPATIENT
Start: 2019-01-17 | End: 2019-01-17

## 2019-01-17 RX ORDER — PROCHLORPERAZINE MALEATE 10 MG
10 TABLET ORAL EVERY 6 HOURS PRN
Status: CANCELLED | OUTPATIENT
Start: 2019-01-17

## 2019-01-17 RX ADMIN — FLUOROURACIL 3700 MG: 50 INJECTION, SOLUTION INTRAVENOUS at 13:45:00

## 2019-01-17 RX ADMIN — FLUOROURACIL 600 MG: 50 INJECTION, SOLUTION INTRAVENOUS at 13:39:00

## 2019-01-17 NOTE — PROGRESS NOTES
Patient is here for cycle 11 of chemo and APN assessment. Patient is back on veggie diet and carrot juice daily. Patient is avoiding sugar in her diet. B/p is better.  Patient states stools were darker in color until she went back on her veggie diet and now

## 2019-01-17 NOTE — PROGRESS NOTES
ANP Visit Note    Patient Name: Aries Palacio   YOB: 1945   Medical Record Number: [id number]   CSN: [id number]   Date of visit: 1/17/2019       Chief Complaint/Reason for Visit:  Patient presents with:  Chemotherapy  Metastatic Rectal Cance Problem Relation Age of Onset   • Heart Disease Father    • Prostate Cancer Father    • Cancer Mother    • Cancer Brother         pancreas       Social History:  Social History    Socioeconomic History      Marital status: Single      Spouse name: Not on Pertinent positives and negatives noted in the the HPI. Performance Status: ECOG 1    Physical Examination:  General: Patient is alert and oriented x 3, not in acute distress.   Vital Signs: /77 (BP Location: Left arm, Patient Position: Sitting, Cu condition.   QUALITY OF PREPARATION: [place] Bowel Preparation Scale:            -       Left colon 2   FINDINGS/THERAPEUTICS:  · COLON: No significant change of partially obstructive ulcerated circumferential malignant mass at 12 cm from the anal verge  REC 3. 27 1.30 - 6.70 x10 (3) uL    Neutrophil Absolute 3.27 1.30 - 6.70 x10(3) uL    Lymphocyte Absolute 1.09 0.90 - 4.00 x10(3) uL    Monocyte Absolute 0.87 0.10 - 1.00 x10(3) uL    Eosinophil Absolute 0.07 0.00 - 0.30 x10(3) uL    Basophil Absolute 0.02 0.00 ABDOMEN/PELVIS:  LIVER:  Multiple regions of hypo enhancement involving the liver are again noted and not significantly changed in overall size and appearance since previous study consistent with stable metastatic treated disease.   The largest region of 1/15/2019 at 7:49       Approved by: Maeve Elias MD         Impression/Plan:  1. Metastatic Rectal Cancer: stable on maintenance therapy, will continue. Long discussion with patient regarding alternative therapies.  She is going to think about this but is

## 2019-01-17 NOTE — PROGRESS NOTES
Pt here for C11D1.   Arrives Ambulating independently, accompanied by            Modifications in dose or schedule: No     Frequency of blood return and site check throughout administration: Prior to administration   Discharged to Home, Ambulating independe

## 2019-01-19 ENCOUNTER — NURSE ONLY (OUTPATIENT)
Dept: HEMATOLOGY/ONCOLOGY | Facility: HOSPITAL | Age: 74
End: 2019-01-19
Attending: INTERNAL MEDICINE
Payer: MEDICARE

## 2019-01-19 PROCEDURE — 96523 IRRIG DRUG DELIVERY DEVICE: CPT

## 2019-01-31 ENCOUNTER — SNF/IP PROF CHARGE ONLY (OUTPATIENT)
Dept: HEMATOLOGY/ONCOLOGY | Facility: HOSPITAL | Age: 74
End: 2019-01-31

## 2019-01-31 DIAGNOSIS — C20 RECTAL CANCER (HCC): ICD-10-CM

## 2019-01-31 PROCEDURE — G9678 ONCOLOGY CARE MODEL SERVICE: HCPCS | Performed by: INTERNAL MEDICINE

## 2019-02-07 ENCOUNTER — OFFICE VISIT (OUTPATIENT)
Dept: HEMATOLOGY/ONCOLOGY | Age: 74
End: 2019-02-07
Attending: INTERNAL MEDICINE
Payer: MEDICARE

## 2019-02-07 VITALS
HEART RATE: 83 BPM | WEIGHT: 115 LBS | TEMPERATURE: 99 F | DIASTOLIC BLOOD PRESSURE: 81 MMHG | OXYGEN SATURATION: 100 % | BODY MASS INDEX: 19 KG/M2 | SYSTOLIC BLOOD PRESSURE: 132 MMHG | RESPIRATION RATE: 16 BRPM

## 2019-02-07 DIAGNOSIS — C78.7 SECONDARY CARCINOMA OF LIVER (HCC): Primary | ICD-10-CM

## 2019-02-07 DIAGNOSIS — E87.1 HYPONATREMIA: ICD-10-CM

## 2019-02-07 DIAGNOSIS — R79.89 ELEVATED LFTS: ICD-10-CM

## 2019-02-07 DIAGNOSIS — I15.9 SECONDARY HYPERTENSION: ICD-10-CM

## 2019-02-07 DIAGNOSIS — C20 RECTAL CANCER (HCC): ICD-10-CM

## 2019-02-07 DIAGNOSIS — Z51.11 ENCOUNTER FOR CHEMOTHERAPY MANAGEMENT: ICD-10-CM

## 2019-02-07 DIAGNOSIS — K52.1 DIARRHEA DUE TO DRUG: ICD-10-CM

## 2019-02-07 DIAGNOSIS — T45.1X5A PERIPHERAL NEUROPATHY DUE TO CHEMOTHERAPY (HCC): ICD-10-CM

## 2019-02-07 DIAGNOSIS — G62.0 PERIPHERAL NEUROPATHY DUE TO CHEMOTHERAPY (HCC): ICD-10-CM

## 2019-02-07 LAB
ALBUMIN SERPL-MCNC: 2.3 G/DL (ref 3.1–4.5)
ALBUMIN/GLOB SERPL: 0.5 {RATIO} (ref 1–2)
ALP LIVER SERPL-CCNC: 453 U/L (ref 55–142)
ALT SERPL-CCNC: 46 U/L (ref 14–54)
ANION GAP SERPL CALC-SCNC: 6 MMOL/L (ref 0–18)
AST SERPL-CCNC: 72 U/L (ref 15–41)
BASOPHILS # BLD AUTO: 0.02 X10(3) UL (ref 0–0.2)
BASOPHILS NFR BLD AUTO: 0.4 %
BILIRUB SERPL-MCNC: 0.5 MG/DL (ref 0.1–2)
BUN BLD-MCNC: 11 MG/DL (ref 8–20)
BUN/CREAT SERPL: 25.6 (ref 10–20)
CALCIUM BLD-MCNC: 9 MG/DL (ref 8.3–10.3)
CEA SERPL-MCNC: 47.4 NG/ML (ref 0.5–5)
CHLORIDE SERPL-SCNC: 99 MMOL/L (ref 101–111)
CO2 SERPL-SCNC: 27 MMOL/L (ref 22–32)
CONTROL RUN WITHIN 24 HOURS?: YES
CREAT BLD-MCNC: 0.43 MG/DL (ref 0.55–1.02)
DEPRECATED RDW RBC AUTO: 47.5 FL (ref 35.1–46.3)
EOSINOPHIL # BLD AUTO: 0.07 X10(3) UL (ref 0–0.7)
EOSINOPHIL NFR BLD AUTO: 1.4 %
ERYTHROCYTE [DISTWIDTH] IN BLOOD BY AUTOMATED COUNT: 14.1 % (ref 11–15)
GLOBULIN PLAS-MCNC: 5.1 G/DL (ref 2.8–4.4)
GLUCOSE BLD-MCNC: 101 MG/DL (ref 70–99)
GLUCOSE URINE: NEGATIVE
HCT VFR BLD AUTO: 40.3 % (ref 35–48)
HGB BLD-MCNC: 13 G/DL (ref 12–16)
IMM GRANULOCYTES # BLD AUTO: 0.01 X10(3) UL (ref 0–1)
IMM GRANULOCYTES NFR BLD: 0.2 %
LYMPHOCYTES # BLD AUTO: 0.95 X10(3) UL (ref 1–4)
LYMPHOCYTES NFR BLD AUTO: 18.7 %
M PROTEIN MFR SERPL ELPH: 7.4 G/DL (ref 6.4–8.2)
MCH RBC QN AUTO: 29.8 PG (ref 26–34)
MCHC RBC AUTO-ENTMCNC: 32.3 G/DL (ref 31–37)
MCV RBC AUTO: 92.4 FL (ref 80–100)
MONOCYTES # BLD AUTO: 0.76 X10(3) UL (ref 0.1–1)
MONOCYTES NFR BLD AUTO: 15 %
NEUTROPHILS # BLD AUTO: 3.27 X10 (3) UL (ref 1.5–7.7)
NEUTROPHILS # BLD AUTO: 3.27 X10(3) UL (ref 1.5–7.7)
NEUTROPHILS NFR BLD AUTO: 64.3 %
NITRITE URINE: NEGATIVE
OSMOLALITY SERPL CALC.SUM OF ELEC: 274 MOSM/KG (ref 275–295)
PLATELET # BLD AUTO: 186 10(3)UL (ref 150–450)
POTASSIUM SERPL-SCNC: 3.9 MMOL/L (ref 3.6–5.1)
RBC # BLD AUTO: 4.36 X10(6)UL (ref 3.8–5.3)
SODIUM SERPL-SCNC: 132 MMOL/L (ref 136–144)
WBC # BLD AUTO: 5.1 X10(3) UL (ref 4–11)

## 2019-02-07 PROCEDURE — 82378 CARCINOEMBRYONIC ANTIGEN: CPT

## 2019-02-07 PROCEDURE — 80053 COMPREHEN METABOLIC PANEL: CPT

## 2019-02-07 PROCEDURE — 85025 COMPLETE CBC W/AUTO DIFF WBC: CPT

## 2019-02-07 PROCEDURE — 96411 CHEMO IV PUSH ADDL DRUG: CPT

## 2019-02-07 PROCEDURE — 96375 TX/PRO/DX INJ NEW DRUG ADDON: CPT

## 2019-02-07 PROCEDURE — 96367 TX/PROPH/DG ADDL SEQ IV INF: CPT

## 2019-02-07 PROCEDURE — 96413 CHEMO IV INFUSION 1 HR: CPT

## 2019-02-07 PROCEDURE — 99215 OFFICE O/P EST HI 40 MIN: CPT | Performed by: CLINICAL NURSE SPECIALIST

## 2019-02-07 RX ORDER — ONDANSETRON 2 MG/ML
8 INJECTION INTRAMUSCULAR; INTRAVENOUS EVERY 6 HOURS PRN
Status: CANCELLED | OUTPATIENT
Start: 2019-02-07

## 2019-02-07 RX ORDER — FLUOROURACIL 50 MG/ML
2400 INJECTION, SOLUTION INTRAVENOUS CONTINUOUS
Status: DISCONTINUED | OUTPATIENT
Start: 2019-02-07 | End: 2019-02-07

## 2019-02-07 RX ORDER — FLUOROURACIL 50 MG/ML
2400 INJECTION, SOLUTION INTRAVENOUS CONTINUOUS
Status: CANCELLED | OUTPATIENT
Start: 2019-02-07

## 2019-02-07 RX ORDER — PROCHLORPERAZINE MALEATE 10 MG
10 TABLET ORAL EVERY 6 HOURS PRN
Status: CANCELLED | OUTPATIENT
Start: 2019-02-07

## 2019-02-07 RX ORDER — METOCLOPRAMIDE HYDROCHLORIDE 5 MG/ML
10 INJECTION INTRAMUSCULAR; INTRAVENOUS EVERY 6 HOURS PRN
Status: CANCELLED | OUTPATIENT
Start: 2019-02-07

## 2019-02-07 RX ORDER — FLUOROURACIL 50 MG/ML
400 INJECTION, SOLUTION INTRAVENOUS ONCE
Status: CANCELLED | OUTPATIENT
Start: 2019-02-07

## 2019-02-07 RX ORDER — FLUOROURACIL 50 MG/ML
400 INJECTION, SOLUTION INTRAVENOUS ONCE
Status: COMPLETED | OUTPATIENT
Start: 2019-02-07 | End: 2019-02-07

## 2019-02-07 RX ORDER — LORAZEPAM 0.5 MG/1
TABLET ORAL EVERY 4 HOURS PRN
Status: CANCELLED | OUTPATIENT
Start: 2019-02-07

## 2019-02-07 RX ORDER — LORAZEPAM 2 MG/ML
INJECTION INTRAMUSCULAR EVERY 4 HOURS PRN
Status: CANCELLED | OUTPATIENT
Start: 2019-02-07

## 2019-02-07 RX ADMIN — FLUOROURACIL 3700 MG: 50 INJECTION, SOLUTION INTRAVENOUS at 12:44:00

## 2019-02-07 RX ADMIN — FLUOROURACIL 600 MG: 50 INJECTION, SOLUTION INTRAVENOUS at 12:43:00

## 2019-02-07 NOTE — PROGRESS NOTES
Pt here for C12D1.   Arrives Ambulating independently, accompanied by Other self           Modifications in dose or schedule: No     Frequency of blood return and site check throughout administration: Prior to administration, Prior to each drug and Every 2-

## 2019-02-07 NOTE — PROGRESS NOTES
Patient is here for APN assessment and cycle 12 of chemo. Patient reports increase fatigue and weakness in both legs. Patient has been seeing a holistic doctor and will be starting oxygen chamber therapy 4 times weekly next week.  Patient was told by this h

## 2019-02-07 NOTE — PROGRESS NOTES
ANP Visit Note    Patient Name: Chemo Blevins   YOB: 1945   Medical Record Number: [id number]   CSN: [id number]   Date of visit: 2/7/2019       Chief Complaint/Reason for Visit:  Metastatic rectal cancer     Oncologic History:  7/2018 Diagno • CHOLECYSTECTOMY     • COLONOSCOPY     • ERCP,DIAGNOSTIC     • FLEXIBLE SIGMOIDOSCOPY N/A 12/11/2018    Performed by Augustine Nelson MD at [facility] ENDOSCOPY   • HERNIA SURGERY  2002    right inguinal       Allergies:    Feraheme [Elumoxyt*    PAIN, SHORT needed for Nausea., Disp: 30 tablet, Rfl: 3  •  ZINC OR, Use as directed in the mouth or throat daily. , Disp: , Rfl:   •  IRON OR, Take 2 tablets by mouth daily.   , Disp: , Rfl:   •  IRON-B12-VITAMINS OR, Take by mouth., Disp: , Rfl:   •  omega-3 fatty aci Phosphatase 453 (H) 55 - 142 U/L    Bilirubin, Total 0.5 0.1 - 2.0 mg/dL    Total Protein 7.4 6.4 - 8.2 g/dL    Albumin 2.3 (L) 3.1 - 4.5 g/dL    Globulin  5.1 (H) 2.8 - 4.4 g/dL    A/G Ratio 0.5 (L) 1.0 - 2.0   CEA    Collection Time: 02/07/19  9:16 AM Hyponatremia: discussed to increase sodium  4. Elevated LFT's: continue to monitor  5. Diarrhea: improved with diet change   6. HTN: improved, she stopped anti-hypertensives.      Emotional Well Being:  I have assessed the patient's emotional well-being and

## 2019-02-08 DIAGNOSIS — C20 RECTAL CANCER (HCC): Primary | ICD-10-CM

## 2019-02-08 RX ORDER — SODIUM CHLORIDE 0.9 % (FLUSH) 0.9 %
10 SYRINGE (ML) INJECTION ONCE
Status: COMPLETED | OUTPATIENT
Start: 2019-02-09 | End: 2019-02-09

## 2019-02-08 RX ORDER — SODIUM CHLORIDE 0.9 % (FLUSH) 0.9 %
10 SYRINGE (ML) INJECTION ONCE
Status: CANCELLED | OUTPATIENT
Start: 2019-02-08

## 2019-02-08 NOTE — PROGRESS NOTES
Spoke to patient. She is seeing a Bia Grimes who is making a variety of vitamin and other recommendations. He also showed her her red blood cells under a microscope an reportedly pointed out \"parasites in her red cells\".   I told her this was not true

## 2019-02-09 ENCOUNTER — NURSE ONLY (OUTPATIENT)
Dept: HEMATOLOGY/ONCOLOGY | Facility: HOSPITAL | Age: 74
End: 2019-02-09
Attending: INTERNAL MEDICINE
Payer: MEDICARE

## 2019-02-09 DIAGNOSIS — D50.9 IRON DEFICIENCY ANEMIA, UNSPECIFIED IRON DEFICIENCY ANEMIA TYPE: ICD-10-CM

## 2019-02-09 DIAGNOSIS — C20 RECTAL CANCER (HCC): Primary | ICD-10-CM

## 2019-02-09 PROCEDURE — 96523 IRRIG DRUG DELIVERY DEVICE: CPT

## 2019-02-09 RX ADMIN — SODIUM CHLORIDE 0.9 % (FLUSH) 10 ML: 0.9 % SYRINGE (ML) INJECTION at 10:33:00

## 2019-02-09 NOTE — PROGRESS NOTES
Patient here for cadyunier disconnect. States feels well, but reports having tripped over a box at work and having pain in right hip and groin area and painful to walk. Denies having a xray or seeing a physician for this.  Instructed she should have an xray and

## 2019-02-11 ENCOUNTER — APPOINTMENT (OUTPATIENT)
Dept: GENERAL RADIOLOGY | Age: 74
End: 2019-02-11
Attending: PHYSICIAN ASSISTANT
Payer: MEDICARE

## 2019-02-11 ENCOUNTER — HOSPITAL ENCOUNTER (OUTPATIENT)
Age: 74
Discharge: HOME | End: 2019-02-11
Payer: MEDICARE

## 2019-02-11 VITALS
SYSTOLIC BLOOD PRESSURE: 143 MMHG | DIASTOLIC BLOOD PRESSURE: 85 MMHG | OXYGEN SATURATION: 98 % | HEART RATE: 80 BPM | RESPIRATION RATE: 22 BRPM | TEMPERATURE: 98 F

## 2019-02-11 DIAGNOSIS — S70.01XA CONTUSION OF RIGHT HIP, INITIAL ENCOUNTER: Primary | ICD-10-CM

## 2019-02-11 DIAGNOSIS — S80.01XA CONTUSION OF RIGHT KNEE, INITIAL ENCOUNTER: ICD-10-CM

## 2019-02-11 PROCEDURE — 73502 X-RAY EXAM HIP UNI 2-3 VIEWS: CPT | Performed by: PHYSICIAN ASSISTANT

## 2019-02-11 PROCEDURE — 99204 OFFICE O/P NEW MOD 45 MIN: CPT

## 2019-02-11 PROCEDURE — 99213 OFFICE O/P EST LOW 20 MIN: CPT

## 2019-02-11 RX ORDER — NAPROXEN 500 MG/1
500 TABLET ORAL 2 TIMES DAILY PRN
Qty: 20 TABLET | Refills: 0 | Status: SHIPPED | OUTPATIENT
Start: 2019-02-11 | End: 2019-02-18

## 2019-02-11 NOTE — ED PROVIDER NOTES
Patient Seen in: [facility]    History   Patient presents with:  Hip Pain    Stated Complaint: right leg hip,leg,back pain,fell    HPI    42-year-old female here with complaint of pain to her right hip and knee.   Patient reports hip,leg,back pain,fell  Other systems are as noted in HPI. Constitutional and vital signs reviewed. All other systems reviewed and negative except as noted above.     Physical Exam     ED Triage Vitals [02/11/19 1502]   /85   Pulse 80   Resp 22 back.  Pain is getting better can now walk. Pain in over the medial aspect of the hip into the groin. FINDINGS: No evidence of fracture or dislocation. Bilateral hip joint spaces are well-preserved. SI joints are unremarkable.  Bilateral acetabular osteo

## 2019-02-11 NOTE — ED INITIAL ASSESSMENT (HPI)
Tripped over a box four days and fell on right knee and hip. Here due to pain on right hip / thigh. Pain radiates to right leg.

## 2019-02-22 ENCOUNTER — NURSE ONLY (OUTPATIENT)
Dept: HEMATOLOGY/ONCOLOGY | Age: 74
End: 2019-02-22
Attending: INTERNAL MEDICINE
Payer: MEDICARE

## 2019-02-22 DIAGNOSIS — C20 RECTAL CANCER (HCC): ICD-10-CM

## 2019-02-22 LAB
ALBUMIN SERPL-MCNC: 2.3 G/DL (ref 3.4–5)
ALBUMIN/GLOB SERPL: 0.5 {RATIO} (ref 1–2)
ALP LIVER SERPL-CCNC: 533 U/L (ref 55–142)
ALT SERPL-CCNC: 66 U/L (ref 13–56)
ANION GAP SERPL CALC-SCNC: 7 MMOL/L (ref 0–18)
AST SERPL-CCNC: 104 U/L (ref 15–37)
BASOPHILS # BLD AUTO: 0.01 X10(3) UL (ref 0–0.2)
BASOPHILS NFR BLD AUTO: 0.1 %
BILIRUB SERPL-MCNC: 0.5 MG/DL (ref 0.1–2)
BUN BLD-MCNC: 10 MG/DL (ref 7–18)
BUN/CREAT SERPL: 23.8 (ref 10–20)
CALCIUM BLD-MCNC: 8.7 MG/DL (ref 8.5–10.1)
CEA SERPL-MCNC: 63.3 NG/ML (ref ?–5)
CHLORIDE SERPL-SCNC: 100 MMOL/L (ref 98–107)
CO2 SERPL-SCNC: 28 MMOL/L (ref 21–32)
CREAT BLD-MCNC: 0.42 MG/DL (ref 0.55–1.02)
DEPRECATED RDW RBC AUTO: 48.3 FL (ref 35.1–46.3)
EOSINOPHIL # BLD AUTO: 0.05 X10(3) UL (ref 0–0.7)
EOSINOPHIL NFR BLD AUTO: 0.7 %
ERYTHROCYTE [DISTWIDTH] IN BLOOD BY AUTOMATED COUNT: 14.6 % (ref 11–15)
GLOBULIN PLAS-MCNC: 4.7 G/DL (ref 2.8–4.4)
GLUCOSE BLD-MCNC: 90 MG/DL (ref 70–99)
HCT VFR BLD AUTO: 39.1 % (ref 35–48)
HGB BLD-MCNC: 12.5 G/DL (ref 12–16)
IMM GRANULOCYTES # BLD AUTO: 0.01 X10(3) UL (ref 0–1)
IMM GRANULOCYTES NFR BLD: 0.1 %
LYMPHOCYTES # BLD AUTO: 1.01 X10(3) UL (ref 1–4)
LYMPHOCYTES NFR BLD AUTO: 15 %
M PROTEIN MFR SERPL ELPH: 7 G/DL (ref 6.4–8.2)
MCH RBC QN AUTO: 29.4 PG (ref 26–34)
MCHC RBC AUTO-ENTMCNC: 32 G/DL (ref 31–37)
MCV RBC AUTO: 92 FL (ref 80–100)
MONOCYTES # BLD AUTO: 0.89 X10(3) UL (ref 0.1–1)
MONOCYTES NFR BLD AUTO: 13.2 %
NEUTROPHILS # BLD AUTO: 4.75 X10 (3) UL (ref 1.5–7.7)
NEUTROPHILS # BLD AUTO: 4.75 X10(3) UL (ref 1.5–7.7)
NEUTROPHILS NFR BLD AUTO: 70.9 %
OSMOLALITY SERPL CALC.SUM OF ELEC: 279 MOSM/KG (ref 275–295)
PLATELET # BLD AUTO: 135 10(3)UL (ref 150–450)
POTASSIUM SERPL-SCNC: 4.2 MMOL/L (ref 3.5–5.1)
RBC # BLD AUTO: 4.25 X10(6)UL (ref 3.8–5.3)
SODIUM SERPL-SCNC: 135 MMOL/L (ref 136–145)
WBC # BLD AUTO: 6.7 X10(3) UL (ref 4–11)

## 2019-02-22 PROCEDURE — 85025 COMPLETE CBC W/AUTO DIFF WBC: CPT

## 2019-02-22 PROCEDURE — 80053 COMPREHEN METABOLIC PANEL: CPT

## 2019-02-22 PROCEDURE — 82378 CARCINOEMBRYONIC ANTIGEN: CPT

## 2019-02-22 PROCEDURE — 36591 DRAW BLOOD OFF VENOUS DEVICE: CPT

## 2019-02-25 ENCOUNTER — TELEPHONE (OUTPATIENT)
Dept: HEMATOLOGY/ONCOLOGY | Facility: HOSPITAL | Age: 74
End: 2019-02-25

## 2019-02-25 DIAGNOSIS — C20 RECTAL CANCER (HCC): Primary | ICD-10-CM

## 2019-02-25 DIAGNOSIS — R97.0 ELEVATED CARCINOEMBRYONIC ANTIGEN (CEA): ICD-10-CM

## 2019-02-25 NOTE — TELEPHONE ENCOUNTER
Janett Vicente MD  P Edw Mary Ann Galarza Rns             Let her know that the CEA is going up and I want another CT done asap - prior to our visit. Unable to reach pt by phone.  Left M requesting pt to call back as MD would like her to schedule [facility]

## 2019-02-26 NOTE — TELEPHONE ENCOUNTER
Patient returned call from yesterday. Needs a Ct scan prior to next appt. Orders placed. Called patient and left her a voicemail informing her to call central scheduling to make appt. Patient will need to  contrast from [facility].

## 2019-02-27 ENCOUNTER — HOSPITAL ENCOUNTER (OUTPATIENT)
Dept: CT IMAGING | Age: 74
Discharge: HOME | End: 2019-02-27
Attending: INTERNAL MEDICINE
Payer: MEDICARE

## 2019-02-27 DIAGNOSIS — R97.0 ELEVATED CARCINOEMBRYONIC ANTIGEN (CEA): ICD-10-CM

## 2019-02-27 DIAGNOSIS — C20 RECTAL CANCER (HCC): ICD-10-CM

## 2019-02-27 PROCEDURE — 71260 CT THORAX DX C+: CPT | Performed by: INTERNAL MEDICINE

## 2019-02-27 PROCEDURE — 74177 CT ABD & PELVIS W/CONTRAST: CPT | Performed by: INTERNAL MEDICINE

## 2019-02-28 ENCOUNTER — OFFICE VISIT (OUTPATIENT)
Dept: HEMATOLOGY/ONCOLOGY | Age: 74
End: 2019-02-28
Attending: INTERNAL MEDICINE
Payer: MEDICARE

## 2019-02-28 ENCOUNTER — SNF/IP PROF CHARGE ONLY (OUTPATIENT)
Dept: HEMATOLOGY/ONCOLOGY | Facility: HOSPITAL | Age: 74
End: 2019-02-28

## 2019-02-28 VITALS
OXYGEN SATURATION: 98 % | DIASTOLIC BLOOD PRESSURE: 78 MMHG | RESPIRATION RATE: 16 BRPM | SYSTOLIC BLOOD PRESSURE: 130 MMHG | HEIGHT: 65 IN | BODY MASS INDEX: 18.58 KG/M2 | HEART RATE: 74 BPM | WEIGHT: 111.5 LBS | TEMPERATURE: 99 F

## 2019-02-28 DIAGNOSIS — D50.9 IRON DEFICIENCY ANEMIA, UNSPECIFIED IRON DEFICIENCY ANEMIA TYPE: ICD-10-CM

## 2019-02-28 DIAGNOSIS — G62.0 PERIPHERAL NEUROPATHY DUE TO CHEMOTHERAPY (HCC): ICD-10-CM

## 2019-02-28 DIAGNOSIS — C20 RECTAL CANCER (HCC): Primary | ICD-10-CM

## 2019-02-28 DIAGNOSIS — C78.7 SECONDARY CARCINOMA OF LIVER (HCC): ICD-10-CM

## 2019-02-28 DIAGNOSIS — C20 RECTAL CANCER (HCC): ICD-10-CM

## 2019-02-28 DIAGNOSIS — T45.1X5A PERIPHERAL NEUROPATHY DUE TO CHEMOTHERAPY (HCC): ICD-10-CM

## 2019-02-28 LAB
ALBUMIN SERPL-MCNC: 2.1 G/DL (ref 3.4–5)
ALBUMIN/GLOB SERPL: 0.4 {RATIO} (ref 1–2)
ALP LIVER SERPL-CCNC: 586 U/L (ref 55–142)
ALT SERPL-CCNC: 92 U/L (ref 13–56)
ANION GAP SERPL CALC-SCNC: 8 MMOL/L (ref 0–18)
AST SERPL-CCNC: 101 U/L (ref 15–37)
BASOPHILS # BLD AUTO: 0.02 X10(3) UL (ref 0–0.2)
BASOPHILS NFR BLD AUTO: 0.3 %
BILIRUB SERPL-MCNC: 0.7 MG/DL (ref 0.1–2)
BUN BLD-MCNC: 8 MG/DL (ref 7–18)
BUN/CREAT SERPL: 17.8 (ref 10–20)
CALCIUM BLD-MCNC: 9 MG/DL (ref 8.5–10.1)
CEA SERPL-MCNC: 55.8 NG/ML (ref ?–5)
CHLORIDE SERPL-SCNC: 99 MMOL/L (ref 98–107)
CO2 SERPL-SCNC: 26 MMOL/L (ref 21–32)
CREAT BLD-MCNC: 0.45 MG/DL (ref 0.55–1.02)
DEPRECATED RDW RBC AUTO: 48 FL (ref 35.1–46.3)
EOSINOPHIL # BLD AUTO: 0.05 X10(3) UL (ref 0–0.7)
EOSINOPHIL NFR BLD AUTO: 0.8 %
ERYTHROCYTE [DISTWIDTH] IN BLOOD BY AUTOMATED COUNT: 14.4 % (ref 11–15)
GLOBULIN PLAS-MCNC: 5 G/DL (ref 2.8–4.4)
GLUCOSE BLD-MCNC: 102 MG/DL (ref 70–99)
HCT VFR BLD AUTO: 38 % (ref 35–48)
HGB BLD-MCNC: 12.2 G/DL (ref 12–16)
IMM GRANULOCYTES # BLD AUTO: 0.02 X10(3) UL (ref 0–1)
IMM GRANULOCYTES NFR BLD: 0.3 %
LYMPHOCYTES # BLD AUTO: 0.84 X10(3) UL (ref 1–4)
LYMPHOCYTES NFR BLD AUTO: 13.6 %
M PROTEIN MFR SERPL ELPH: 7.1 G/DL (ref 6.4–8.2)
MCH RBC QN AUTO: 29.4 PG (ref 26–34)
MCHC RBC AUTO-ENTMCNC: 32.1 G/DL (ref 31–37)
MCV RBC AUTO: 91.6 FL (ref 80–100)
MONOCYTES # BLD AUTO: 1 X10(3) UL (ref 0.1–1)
MONOCYTES NFR BLD AUTO: 16.2 %
NEUTROPHILS # BLD AUTO: 4.26 X10 (3) UL (ref 1.5–7.7)
NEUTROPHILS # BLD AUTO: 4.26 X10(3) UL (ref 1.5–7.7)
NEUTROPHILS NFR BLD AUTO: 68.8 %
OSMOLALITY SERPL CALC.SUM OF ELEC: 275 MOSM/KG (ref 275–295)
PLATELET # BLD AUTO: 164 10(3)UL (ref 150–450)
POTASSIUM SERPL-SCNC: 4.2 MMOL/L (ref 3.5–5.1)
RBC # BLD AUTO: 4.15 X10(6)UL (ref 3.8–5.3)
SODIUM SERPL-SCNC: 133 MMOL/L (ref 136–145)
WBC # BLD AUTO: 6.2 X10(3) UL (ref 4–11)

## 2019-02-28 PROCEDURE — 99214 OFFICE O/P EST MOD 30 MIN: CPT | Performed by: INTERNAL MEDICINE

## 2019-02-28 PROCEDURE — 80053 COMPREHEN METABOLIC PANEL: CPT

## 2019-02-28 PROCEDURE — 96417 CHEMO IV INFUS EACH ADDL SEQ: CPT

## 2019-02-28 PROCEDURE — 82378 CARCINOEMBRYONIC ANTIGEN: CPT

## 2019-02-28 PROCEDURE — 96413 CHEMO IV INFUSION 1 HR: CPT

## 2019-02-28 PROCEDURE — G9678 ONCOLOGY CARE MODEL SERVICE: HCPCS | Performed by: INTERNAL MEDICINE

## 2019-02-28 PROCEDURE — 85025 COMPLETE CBC W/AUTO DIFF WBC: CPT

## 2019-02-28 PROCEDURE — 96375 TX/PRO/DX INJ NEW DRUG ADDON: CPT

## 2019-02-28 RX ORDER — SODIUM CHLORIDE 0.9 % (FLUSH) 0.9 %
10 SYRINGE (ML) INJECTION ONCE
Status: CANCELLED | OUTPATIENT
Start: 2019-02-28

## 2019-02-28 RX ORDER — ONDANSETRON 2 MG/ML
8 INJECTION INTRAMUSCULAR; INTRAVENOUS EVERY 6 HOURS PRN
Status: CANCELLED | OUTPATIENT
Start: 2019-03-01

## 2019-02-28 RX ORDER — PROCHLORPERAZINE MALEATE 10 MG
10 TABLET ORAL EVERY 6 HOURS PRN
Status: CANCELLED | OUTPATIENT
Start: 2019-03-01

## 2019-02-28 RX ORDER — ATROPINE SULFATE 0.4 MG/ML
0.2 AMPUL (ML) INJECTION ONCE
Status: COMPLETED | OUTPATIENT
Start: 2019-02-28 | End: 2019-02-28

## 2019-02-28 RX ORDER — ONDANSETRON HYDROCHLORIDE 8 MG/1
8 TABLET, FILM COATED ORAL EVERY 8 HOURS PRN
Qty: 30 TABLET | Refills: 3 | Status: ON HOLD | OUTPATIENT
Start: 2019-02-28 | End: 2019-01-01

## 2019-02-28 RX ORDER — PROCHLORPERAZINE MALEATE 10 MG
10 TABLET ORAL EVERY 6 HOURS PRN
Qty: 30 TABLET | Refills: 3 | Status: ON HOLD | OUTPATIENT
Start: 2019-02-28 | End: 2019-01-01

## 2019-02-28 RX ORDER — ATROPINE SULFATE 0.4 MG/ML
0.2 AMPUL (ML) INJECTION ONCE
Status: CANCELLED | OUTPATIENT
Start: 2019-03-01

## 2019-02-28 RX ORDER — LORAZEPAM 2 MG/ML
INJECTION INTRAMUSCULAR EVERY 4 HOURS PRN
Status: CANCELLED | OUTPATIENT
Start: 2019-03-01

## 2019-02-28 RX ORDER — SODIUM CHLORIDE 0.9 % (FLUSH) 0.9 %
10 SYRINGE (ML) INJECTION ONCE
Status: COMPLETED | OUTPATIENT
Start: 2019-02-28 | End: 2019-02-28

## 2019-02-28 RX ORDER — METOCLOPRAMIDE HYDROCHLORIDE 5 MG/ML
10 INJECTION INTRAMUSCULAR; INTRAVENOUS EVERY 6 HOURS PRN
Status: CANCELLED | OUTPATIENT
Start: 2019-03-01

## 2019-02-28 RX ORDER — LORAZEPAM 0.5 MG/1
TABLET ORAL EVERY 4 HOURS PRN
Status: CANCELLED | OUTPATIENT
Start: 2019-03-01

## 2019-02-28 RX ADMIN — SODIUM CHLORIDE 0.9 % (FLUSH) 10 ML: 0.9 % SYRINGE (ML) INJECTION at 13:55:00

## 2019-02-28 RX ADMIN — ATROPINE SULFATE 0.2 MG: 0.4 MG/ML AMPUL (ML) INJECTION at 12:21:00

## 2019-02-28 NOTE — PROGRESS NOTES
Patient is here for cycle 13 of chemo. Patient fell 3 weeks ago and injured right hip and knee. Patient has been limping since. Appetite is fair. Eating veggies mostly. Lost 4 lbs since last visit. Patient was having frequent stools after eating.  Now on he

## 2019-02-28 NOTE — PROGRESS NOTES
At 1355 pt reported feeling hot/sweaty. Pt states she felt this was for about an hour. VSS taken at 1357 and were stable. Pt declined any other symptoms. Pt insisted she was fine to go. Notified MD. Pt left in stable condition.

## 2019-02-28 NOTE — PROGRESS NOTES
Pt here for C13D1.   Arrives Ambulating independently, accompanied by Self           Modifications in dose or schedule: yes-changing to avastin/irinotecan     Frequency of blood return and site check throughout administration: Prior to administration   Disc

## 2019-03-01 ENCOUNTER — TELEPHONE (OUTPATIENT)
Dept: HEMATOLOGY/ONCOLOGY | Facility: HOSPITAL | Age: 74
End: 2019-03-01

## 2019-03-01 NOTE — PROGRESS NOTES
[facility]    PATIENT'S NAME: Chau Granados   ATTENDING PHYSICIAN: Lisa Flanagan M.D.    PATIENT ACCOUNT #: [de-identified] LOCATION: [address] [place] [address] RECORD #: [id number] YOB: 1945   DATE OF SERVICE: 02/28/2019       CANC[facility] or the change in her diet. She has now gone to a meatless diet.   Her current medications include iron, B12, multivitamin, naproxen p.r.n., omega-3 fatty acids, ondansetron, prochlorperazine, and zinc.     PHYSICAL EXAMINATION:    GENERAL:  She is a well d explained that she could have mucositis and diarrhea, cytopenia may also occur. Usually the drug is fairly well tolerated; about 10% of people have major toxicities from this.   There is no proof that addition of 5-FU and leucovorin to this would increase

## 2019-03-01 NOTE — TELEPHONE ENCOUNTER
Date of Treatment: 2/28/19                                Chemo: Irinotecan/Avastin    Comments: Spoke with patient. When she was receiving treatment yesterday she c/o feeling hot when finished and slightly sweaty.  Nurse said patient did not say her hands

## 2019-03-02 ENCOUNTER — APPOINTMENT (OUTPATIENT)
Dept: HEMATOLOGY/ONCOLOGY | Facility: HOSPITAL | Age: 74
End: 2019-03-02
Attending: INTERNAL MEDICINE
Payer: MEDICARE

## 2019-03-07 ENCOUNTER — OFFICE VISIT (OUTPATIENT)
Dept: HEMATOLOGY/ONCOLOGY | Age: 74
End: 2019-03-07
Attending: INTERNAL MEDICINE
Payer: MEDICARE

## 2019-03-07 VITALS
BODY MASS INDEX: 19 KG/M2 | SYSTOLIC BLOOD PRESSURE: 156 MMHG | WEIGHT: 113 LBS | TEMPERATURE: 98 F | RESPIRATION RATE: 16 BRPM | DIASTOLIC BLOOD PRESSURE: 84 MMHG | OXYGEN SATURATION: 100 % | HEART RATE: 85 BPM

## 2019-03-07 DIAGNOSIS — Z51.11 ENCOUNTER FOR CHEMOTHERAPY MANAGEMENT: ICD-10-CM

## 2019-03-07 DIAGNOSIS — C78.7 SECONDARY CARCINOMA OF LIVER (HCC): ICD-10-CM

## 2019-03-07 DIAGNOSIS — C20 RECTAL CANCER (HCC): Primary | ICD-10-CM

## 2019-03-07 PROCEDURE — 99213 OFFICE O/P EST LOW 20 MIN: CPT | Performed by: CLINICAL NURSE SPECIALIST

## 2019-03-07 NOTE — PROGRESS NOTES
ANP Visit Note    Patient Name: Obed Garcia   YOB: 1945   Medical Record Number: [id number]   CSN: [id number]   Date of visit: 3/7/2019       Chief Complaint/Reason for Visit:  Patient presents with:   Follow - Up  Metastatic Rectal Cancer, HERNIA,5+Y/O,REDUCIBL         Allergies:    Feraheme [Ferumoxyt*    PAIN, SHORTNESS OF BREATH    Comment:Felt short of breath, like she was going to faint,             and severe back pain    Family History:  Family History   Problem Relation Age of Onset Narrative      Not on file      Medications:    Current Outpatient Medications:   •  Prochlorperazine Maleate (COMPAZINE) 10 mg tablet, Take 1 tablet (10 mg total) by mouth every 6 (six) hours as needed for Nausea., Disp: 30 tablet, Rfl: 3  •  Ondansetron good for her trip. She will schedule her next appointment once she returns. Emotional Well Being:  I have assessed the patient's emotional well-being and any concerns about anxiety or depression.   We discussed issues of distress, coping difficulties a

## 2019-03-07 NOTE — PROGRESS NOTES
Pt here for day 8 follow up. She states she does not want chemo next week because she is going on a trip. Pt complains of fatigue. The day of chemo she could not sleep. She states her fingers and feet were red and numb/tingling.   She states she had

## 2019-03-14 ENCOUNTER — APPOINTMENT (OUTPATIENT)
Dept: HEMATOLOGY/ONCOLOGY | Age: 74
End: 2019-03-14
Attending: INTERNAL MEDICINE
Payer: MEDICARE

## 2019-03-21 ENCOUNTER — APPOINTMENT (OUTPATIENT)
Dept: HEMATOLOGY/ONCOLOGY | Age: 74
End: 2019-03-21
Attending: INTERNAL MEDICINE
Payer: MEDICARE

## 2019-03-31 ENCOUNTER — SNF/IP PROF CHARGE ONLY (OUTPATIENT)
Dept: HEMATOLOGY/ONCOLOGY | Facility: HOSPITAL | Age: 74
End: 2019-03-31

## 2019-03-31 DIAGNOSIS — C20 RECTAL CANCER (HCC): ICD-10-CM

## 2019-03-31 PROCEDURE — G9678 ONCOLOGY CARE MODEL SERVICE: HCPCS | Performed by: INTERNAL MEDICINE

## 2019-04-05 ENCOUNTER — HOSPITAL ENCOUNTER (INPATIENT)
Facility: HOSPITAL | Age: 74
LOS: 4 days | Discharge: HOME | DRG: 194 | End: 2019-01-01
Attending: EMERGENCY MEDICINE | Admitting: HOSPITALIST
Payer: MEDICARE

## 2019-04-05 ENCOUNTER — APPOINTMENT (OUTPATIENT)
Dept: CT IMAGING | Facility: HOSPITAL | Age: 74
DRG: 194 | End: 2019-04-05
Attending: EMERGENCY MEDICINE
Payer: MEDICARE

## 2019-04-05 ENCOUNTER — APPOINTMENT (OUTPATIENT)
Dept: ULTRASOUND IMAGING | Facility: HOSPITAL | Age: 74
DRG: 194 | End: 2019-04-05
Attending: EMERGENCY MEDICINE
Payer: MEDICARE

## 2019-04-05 ENCOUNTER — APPOINTMENT (OUTPATIENT)
Dept: GENERAL RADIOLOGY | Facility: HOSPITAL | Age: 74
DRG: 194 | End: 2019-04-05
Attending: EMERGENCY MEDICINE
Payer: MEDICARE

## 2019-04-05 DIAGNOSIS — R09.02 HYPOXIA: ICD-10-CM

## 2019-04-05 DIAGNOSIS — J18.9 COMMUNITY ACQUIRED PNEUMONIA, UNSPECIFIED LATERALITY: Primary | ICD-10-CM

## 2019-04-05 PROCEDURE — 74177 CT ABD & PELVIS W/CONTRAST: CPT | Performed by: EMERGENCY MEDICINE

## 2019-04-05 PROCEDURE — 93970 EXTREMITY STUDY: CPT | Performed by: EMERGENCY MEDICINE

## 2019-04-05 PROCEDURE — 99223 1ST HOSP IP/OBS HIGH 75: CPT | Performed by: HOSPITALIST

## 2019-04-05 PROCEDURE — 71275 CT ANGIOGRAPHY CHEST: CPT | Performed by: EMERGENCY MEDICINE

## 2019-04-05 PROCEDURE — 71045 X-RAY EXAM CHEST 1 VIEW: CPT | Performed by: EMERGENCY MEDICINE

## 2019-04-05 RX ORDER — SODIUM CHLORIDE 9 MG/ML
1000 INJECTION, SOLUTION INTRAVENOUS ONCE
Status: COMPLETED | OUTPATIENT
Start: 2019-04-05 | End: 2019-04-05

## 2019-04-06 ENCOUNTER — TELEPHONE (OUTPATIENT)
Dept: MEDSURG UNIT | Facility: HOSPITAL | Age: 74
End: 2019-04-06

## 2019-04-06 PROBLEM — R09.02 HYPOXIA: Status: ACTIVE | Noted: 2019-04-06

## 2019-04-06 PROBLEM — J18.9 COMMUNITY ACQUIRED PNEUMONIA, UNSPECIFIED LATERALITY: Status: ACTIVE | Noted: 2019-04-06

## 2019-04-06 PROCEDURE — 99232 SBSQ HOSP IP/OBS MODERATE 35: CPT | Performed by: HOSPITALIST

## 2019-04-06 RX ORDER — MORPHINE SULFATE 4 MG/ML
INJECTION, SOLUTION INTRAMUSCULAR; INTRAVENOUS
Status: DISCONTINUED
Start: 2019-04-06 | End: 2019-04-06 | Stop reason: WASHOUT

## 2019-04-06 RX ORDER — GUAIFENESIN 600 MG
600 TABLET, EXTENDED RELEASE 12 HR ORAL 2 TIMES DAILY
Status: DISCONTINUED | OUTPATIENT
Start: 2019-04-06 | End: 2019-01-01

## 2019-04-06 RX ORDER — ENOXAPARIN SODIUM 100 MG/ML
40 INJECTION SUBCUTANEOUS DAILY
Status: DISCONTINUED | OUTPATIENT
Start: 2019-04-06 | End: 2019-01-01

## 2019-04-06 RX ORDER — SODIUM CHLORIDE 9 MG/ML
INJECTION, SOLUTION INTRAVENOUS CONTINUOUS
Status: DISCONTINUED | OUTPATIENT
Start: 2019-04-06 | End: 2019-01-01

## 2019-04-06 RX ORDER — POTASSIUM CHLORIDE 14.9 MG/ML
20 INJECTION INTRAVENOUS ONCE
Status: COMPLETED | OUTPATIENT
Start: 2019-04-06 | End: 2019-04-06

## 2019-04-06 RX ORDER — IPRATROPIUM BROMIDE AND ALBUTEROL SULFATE 2.5; .5 MG/3ML; MG/3ML
3 SOLUTION RESPIRATORY (INHALATION) EVERY 4 HOURS PRN
Status: DISCONTINUED | OUTPATIENT
Start: 2019-04-06 | End: 2019-01-01

## 2019-04-06 RX ORDER — IPRATROPIUM BROMIDE AND ALBUTEROL SULFATE 2.5; .5 MG/3ML; MG/3ML
3 SOLUTION RESPIRATORY (INHALATION) EVERY 6 HOURS PRN
Status: DISCONTINUED | OUTPATIENT
Start: 2019-04-06 | End: 2019-01-01

## 2019-04-06 NOTE — ED PROVIDER NOTES
Patient Seen in: [facility] Emergency Department    History   Patient presents with:  Swelling Edema (cardiovascular, metabolic)    Stated Complaint: bilat leg edema cough hx of Ca    HPI    Patient is a 66-year-old female with a history of metastat noted in HPI. Constitutional and vital signs reviewed. All other systems reviewed and negative except as noted above.     Physical Exam     ED Triage Vitals [04/05/19 1946]   /88   Pulse 100   Resp 22   Temp 99.4 °F (37.4 °C)   Temp src Oral   S METABOLIC PANEL (14) - Abnormal; Notable for the following components:       Result Value    Glucose 116 (*)     Sodium 129 (*)     Chloride 95 (*)     Creatinine 0.40 (*)     BUN/CREA Ratio 30.0 (*)     Calculated Osmolality 269 (*)     AST 86 (*)     Alk FLU EXPANDED     EKG    Rate, intervals and axes as noted on EKG Report.   Rate: 98  Rhythm: Sinus Rhythm  Reading: Normal sinus rhythm without any ectopy, normal axis, prolonged QTC of 500 ms, otherwise normal intervals, no ST segment elevations or depress leg ultrasounds were pending at change of shift. I discussed the patient with the oncoming ER physician who will follow up in the CTA of her chest and if positive for PE initiate IV heparin.   I also discussed the patient with Dr. Aravind Singleton who will admit th

## 2019-04-06 NOTE — ED INITIAL ASSESSMENT (HPI)
Pt here with c/o productive cough, edema to BLE, recent flight to [place], returned on Wednesday. Patient has history of colon CA mets to liver. Last chemo treatment approx 3-4 weeks ago. Patient denies any CHARLIE/chest pain.  Breathing mildly labored, even, n

## 2019-04-06 NOTE — PROGRESS NOTES
Family meeting with [facility] hospice at bedside. RN to come evaluate patient tomorrow and arrange all discharge needs.

## 2019-04-06 NOTE — PLAN OF CARE
Pt. Admitted for pneumonia and BLE swelling on 04/05/19. Testing ruled out PE and DVT but confirmed pneumonia. A&O x4. Denies pain. Ambulates independently. VS remain stable, on tele. O2 sats above 95% on RA.  C/o of cough but non-productive; sputum sampl complex needs related to functional status, cognitive ability or social support system  4/6/2019 0430 by Tayler Ulloa RN  Outcome: Progressing       Problem: RESPIRATORY - ADULT  Goal: Achieves optimal ventilation and oxygenation  Description  INTERV

## 2019-04-06 NOTE — PROGRESS NOTES
[facility]  Progress Note     Jennifer Pro Patient Status:  Inpatient    1945 MRN [id number]   [facility] 5NW-A Attending Nydia Aguillon MD   Hosp Day # 0 PCP Sharad Cuenca     Chief Complaint: SOB   S:  Still short of br metastases  1. Follows with Dr. Shravan Baptiste  4. Small Ascites- lower suspicion for SBP  5. Prolonged QT- zithro DC, now on Doxy   6. Hyponatremia- IVF  7. Lower extremity edema  1. US negative for DVT  2.  Likely related to recent travel and intra-abdominal proc

## 2019-04-06 NOTE — PLAN OF CARE
Patient is alert and oriented. Fatigued. Oxygen saturation >90% on room air. Congested, non-productive cough. Dyspnea with exertion. Tele- NSR. BLE (+1) pitting edema. VSS, afebrile. Tolerates diet. Abdomen slightly distended, non tender. Voiding.  Genevia Maine oxygenation  Description  INTERVENTIONS:  - Assess for changes in respiratory status  - Assess for changes in mentation and behavior  - Position to facilitate oxygenation and minimize respiratory effort  - Oxygen supplementation based on oxygen saturation

## 2019-04-06 NOTE — H&P
[facility]  History and Physical     Fadi Johnston Patient Status:  Emergency    1945 MRN [id number]   Location [address] [facility] Attending Braden Lane MD   Hosp Day # 0 PCP Yesica Granados     Chief Complaint: Anika Mcintosh Heart Disease Father    • Prostate Cancer Father    • Cancer Mother    • Cancer Brother         pancreas        Allergies:   Feraheme [Ferumoxyt*    PAIN, SHORTNESS OF BREATH    Comment:Felt short of breath, like she was going to faint,             and sev intact. Musculoskeletal: Moves all extremities. Extremities: 1 + edema bilateral LE  Integument: No rashes or lesions. Psychiatric: Appropriate mood and affect.       Diagnostic Data:      Labs:  Recent Labs   Lab  04/05/19 1955   WBC  17.0*   HGB  12

## 2019-04-07 PROBLEM — C78.7 LIVER METASTASES (HCC): Status: ACTIVE | Noted: 2018-08-01

## 2019-04-07 PROBLEM — C78.7 LIVER METASTASES: Status: ACTIVE | Noted: 2018-08-01

## 2019-04-07 NOTE — CONSULTS
Pulmonary H&P/Consult       NAME: [address] [place] [address] Street: [address] - MRN: [id number] - Age: 68year old - :  1945    Date of Admission: 2019  7:37 PM  Admission Diagnosis: Hypoxia [R09.02]  Community acquired pneumonia, unspecified laterality [ Multiple Vitamins-Minerals (MULTI-VITAMIN/MINERALS) Oral Tab Take 1 tablet by mouth daily. Disp:  Rfl:  Taking   IRON OR Take 2 tablets by mouth daily. Disp:  Rfl:  Taking   IRON-B12-VITAMINS OR Take by mouth.  Disp:  Rfl:  Taking   omega-3 fatty acids Emotionally abused: Not on file        Physically abused: Not on file        Forced sexual activity: Not on file    Other Topics      Concerns:        Not on file    Social History Narrative      Not on file         Family History:  Family History   Proble strokes or seizure history   PSYCHE:  denies depression or anxiety   HEMATOLOGIC:  denies hx of anemia   ENDOCRINE:  denies thyroid history      OBJECTIVE:   04/07/19  0546 04/07/19  1030 04/07/19  1425 04/07/19  1430   BP: (!) 142/95 145/85  (!) 169/103 symmetric all extremities   Skin:   Skin color, texture, turgor normal, no rashes or lesions   Neurologic:   CNII-XII intact.  Normal strength, sensation and reflexes       throughout         Recent Labs     04/05/19  1955 04/06/19  0711 04/07/19  0635   WB

## 2019-04-07 NOTE — PROGRESS NOTES
[facility]  Progress Note     Elsy Melendrez Patient Status:  Inpatient    1945 MRN [id number]   [facility] 5NW-A Attending Ramiro Dasilva MD   Hosp Day # 1 PCP Antoni Martel     Chief Complaint: SOB   S:  Still short of br Due to above  1. Supplemental oxygen as needed  2. CTA negative for PE  3. Metastatic rectal cancer with liver metastases  1. Dr. David Rascon consulted   4. Small Ascites- lower suspicion for SBP  5. Prolonged QT- zithro DC, now on Doxy   6.  Hyponatremia- IVF

## 2019-04-07 NOTE — PLAN OF CARE
Patient is alert and oriented. Drowsy and lethargic at times, ABG obtained. Oxygen saturation >90% on room air. Increased work of breathing. Dyspnea with exertion. Wheezing. Congested, productive cough. Thin white sputum.  Tele, NSR this AM, at 1330 becam patient needs post-hospital services based on physician/LIP order or complex needs related to functional status, cognitive ability or social support system  Outcome: Progressing     Problem: RESPIRATORY - ADULT  Goal: Achieves optimal ventilation and oxyge

## 2019-04-07 NOTE — CONSULTS
[facility] Report of Consultation      Patient Name: Wilfrid Christine   YOB: 1945  Medical Record Number: [id number]  Consulting Physician: Gilberto Clark M.D.    Referring Physician: Tanya Estrada MD    [facility] Prostate Cancer Father    • Cancer Mother    • Cancer Brother         pancreas         Social History Social History    Tobacco Use      Smoking status: Former Smoker        Packs/day: 0.50        Years: 18.00        Pack years: 5        Quit date: 8/1/197 changes. Endocrine No hot flashes; no diabetes, thyroid disease. Hematologic/Lymphatic No tender or enlarged lymph nodes; no easy bruising or bleeding. Respiratory As per HPI. Cardiovascular No anginal chest pain, palpitations, edema, orthopnea.   Lala Arce 27.7 26.0 - 34.0 pg    MCHC 32.1 31.0 - 37.0 g/dL    RDW 15.4 (H) 11.0 - 15.0 %    RDW-SD 48.5 (H) 35.1 - 46.3 fL       Radiology   PROCEDURE:  US VENOUS DOPPLER LEG BILAT - DIAG IMG (CPT=93970)     COMPARISON:  None.      INDICATIONS:  eval for DVT     FRANTZ Technologist)  Patient with bilateral leg edema, cough and recent travel.       CONTRAST USED:  60cc of Omnipaque 350     FINDINGS:       CHEST:    LUNGS:  There is development of mild to moderate reticular nodular infiltrate consistent with pneumonia in th lymphadenopathy. Largest lymph node measures approximately 1 cm. BOWEL/MESENTERY:  There is development of a small amount of ascites. No free air.   Evaluation of the bowel is limited due the lack of IV contrast.  There is a right inguinal hernia which i antibiotics as per primary service. 2.   Lower extremity edema: In combination with apparent ascites on CT scan, this is likely related to progressive hepatic metastases and hypoalbuminemia.  Patient was dehydrated on admission so aggressive diuresis no

## 2019-04-07 NOTE — PLAN OF CARE
Problem: here for PNA  Data: pt is A&O X4 - [place], on RA, SR-ST on tele, voids, c/o back pain due to coughing - refusing PRN pain meds, c/o cough  Action: IVF/IV ABT  Education: pt aware of plan of care  Response: pt verbalizes understanding       Problem: P respiratory status  - Assess for changes in mentation and behavior  - Position to facilitate oxygenation and minimize respiratory effort  - Oxygen supplementation based on oxygen saturation or ABGs  - Provide Smoking Cessation handout, if applicable  - Enc

## 2019-04-08 NOTE — PROGRESS NOTES
[facility]  Progress Note     Doroteo Hilary Patient Status:  Inpatient    1945 MRN [id number]   [facility] 5NW-A Attending Rian Joy MD   Hosp Day # 2 PCP Tara Pittman     Chief Complaint: SOB   S:  Still short of br negative for PE  3. Metastatic rectal cancer with liver metastases  1. Dr. Silvestre Paris consulted   4. Small Ascites- lower suspicion for SBP  5. Prolonged QT- zithro DC, now on Doxy   6. Hyponatremia- IVF  7. Lower extremity edema- US negative for DVT  1.  Elfego Cuello

## 2019-04-08 NOTE — PLAN OF CARE
Problem: Patient/Family Goals  Goal: Patient/Family Short Term Goal  Description  Patient's Short Term Goal: \"to go home soon\"  4/6am- control cough, breath easier  4/6 NOC: manage cough  4/7am- decrease cough  4/7 nocs: less coughing  Interventions: Therapy support as indicated  - Manage/alleviate anxiety  - Monitor for signs/symptoms of CO2 retention  Outcome: Progressing       Pt received tonight asleep, awakens easily to verbal stimuli.   PT is on room air with pulse ox around 94%, breath sounds are

## 2019-04-08 NOTE — PLAN OF CARE
Problem: Patient/Family Goals  Goal: Patient/Family Long Term Goal  Description  Patient's Long Term Goal: \"to have several more years left\" after new cancer treatment in [facility]    Interventions:  - cancer treatment, following doctors' treatment plan oxygen saturation or ABGs  - Provide Smoking Cessation handout, if applicable  - Encourage broncho-pulmonary hygiene including cough, deep breathe, Incentive Spirometry  - Assess the need for suctioning and perform as needed  - Assess and instruct to repor

## 2019-04-08 NOTE — PROGRESS NOTES
Arash Te Patient Status:  Inpatient    1945 MRN [id number]   [facility] 5NW-A Attending Tatianna Priest MD   Hosp Day # 2 PCP Oscar Obrien     SUBJECTIVE: Pt states breathing is improving.   Continues to have murmur.                          Abdomen: soft, non-tender, non-distended, positive BS.                         Extremity: No clubbing or cyanosis. 1+ edema                          Skin: No rashes or lesions.              Lab Results   Component Value Jose Ramon

## 2019-04-08 NOTE — PROGRESS NOTES
Heme/Onc Progress Note    Patient Name: Mimi Thomas   YOB: 1945   Medical Record Number: [id number]   CSN: [id number]   Attending Physician: Lakisha Neal M.D. Subjective:  Still coughing. Weak. Not requiring O2.   Abd moderately scattered diffuse rhonchi. Heart: Regular rate and rhythm. Abdomen: Soft, ascites and hepatomegaly. Extremities: Pedal pulses are present. 2+ edema. Neurological: Grossly intact. Lymphatics:  There is no palpable lymphadenopathy throughout in the cer only a single dose of irinotecan and bevacizumab so cannot call this failure of therapy. However, patient states she is considering pursuing alternative medicine in [facility].  I will try to convince her that she is actually likely to respond to a combination

## 2019-04-08 NOTE — PROGRESS NOTES
Per [address] [facility] shift nurse, Dr. Arabella Bah wanted Port accessed. This nurse encouratged pt to allow this nurse to access port and pt refused. This nurse rounded with Dr. Casie Sagastume  who also encouraged pt to have port accessed and pt again declined.

## 2019-04-08 NOTE — PROGRESS NOTES
Multidisciplinary Discharge Rounds held 4/8/2019. Treatment team members present today include , , Charge Nurse,  Nurse, RT, PT and Pharmacy caring for [employer].      Other care providers present:    Patient Active Problem

## 2019-04-09 NOTE — BH PROGRESS NOTE
Went to see the patient and she refused the level of care assessment. This liaison explained the reason for the visit. She said she has a good group of people at Health system and don't need any mental health services.   The pt and her daughter was explaine

## 2019-04-09 NOTE — PROGRESS NOTES
[facility]  Progress Note     Danielhugo Hurtado Patient Status:  Inpatient    1945 MRN [id number]   [facility] 5NW-A Attending Breanna Mark MD   Hosp Day # 3 PCP Shikha Vega     Chief Complaint: SOB   S:  Still short of br RA  1. Supplemental oxygen as needed  2. CTA negative for PE  3. Metastatic rectal cancer with liver metastases  1. Dr. Bell Olvin consulted   4. Small Ascites- lower suspicion for SBP  5. Prolonged QT- zithro DC, now on Doxy   6. Hyponatremia- IVF  7.  Lower e

## 2019-04-09 NOTE — DIETARY MALNUTRITION NOTE
[facility]    NUTRITION INITIAL ASSESSMENT  Late entry-pt assessed 4/8    Pt meets moderate malnutrition criteria based on low extremity edema and body fat and muscle wasting.     NUTRITION DIAGNOSIS/PROBLEM:    Increased nutrient needs related to incr oz)  10/19/18 : 52.4 kg (115 lb 8 oz)  10/18/18 : 51.7 kg (114 lb)  10/04/18 : 52.8 kg (116 lb 8 oz)  09/20/18 : 52.2 kg (115 lb)  09/06/18 : 51.9 kg (114 lb 8 oz)  08/23/18 : 50.8 kg (112 lb 1.6 oz)  08/16/18 : 49 kg (108 lb)  08/15/18 : 50.3 kg (111 lb)

## 2019-04-09 NOTE — PLAN OF CARE
PROBLEM: PNA     ASSESSMENT: Pt is A&OX4. VSS, afebrile. Maintaining O2 sats >94% on RA. . Tele, NSR, ST with exertion. Voids, tolerating diet no c/o n/v/d this shift, pt does c/o decreased appetite, wctm. Denies pain. PJ hose on.  IVF, continuing IV ab order or complex needs related to functional status, cognitive ability or social support system  Outcome: Progressing     Problem: RESPIRATORY - ADULT  Goal: Achieves optimal ventilation and oxygenation  Description  INTERVENTIONS:  - Assess for changes in

## 2019-04-09 NOTE — PROGRESS NOTES
[facility] Patient Status:  Inpatient    1945 MRN [id number]   [facility] 5NW-A Attending Reyna Brown MD   Hosp Day # 3 PCP Rhys Jasmine     SUBJECTIVE: Pt with increased dyspnea on exertion on afternoon wa BS.                          Extremity: No clubbing or cyanosis. 2+ edema                          Skin: No rashes or lesions.        Lab Results   Component Value Date    WBC 13.8 04/09/2019    RBC 4.16 04/09/2019    HGB 11.6 04/09/2019    HCT 35.6 04/09/2

## 2019-04-09 NOTE — PROGRESS NOTES
Pt is alert and oriented x4. [place], lost hearing aides. RA, sats >92 %. pt with congested cough, tessalon just given. SOB with exertion. Swelling noted to BLE, dedrick hose in place. Belly is distended. IV abts Tele NSR ST with exertion.  K is 3.2, will be replac

## 2019-04-09 NOTE — PROGRESS NOTES
Met with patient and daughters. Explained that her worsening is clearly cancer related and we have a realistic option with treatment utilizing irinotecan and cetuximab (she is BLANCO gene wild type).   If she doesn't get a response to treatment her life could

## 2019-04-09 NOTE — PROGRESS NOTES
Heme/Onc Progress Note    Patient Name: Johnathan Lima   YOB: 1945   Medical Record Number: [id number]   CSN: [id number]   Attending Physician: Abdirahman Patiño M.D. Subjective:  Still coughing but a bit better.   Was ambulating in halls 23.48 kg/m²   HEENT: EOMs intact. PERRL. Oropharynx is clear. Neck: No JVD. No palpable lymphadenopathy. Neck is supple. Chest: scattered rhonchi both lung fields - partially clear with coughing. Heart: Regular rate and rhythm. Abdomen: Distended.   Hui Martinez afternoon. Saeed Parikh (our APN who knows her well) spoke to them by phone yesterday.   I am ready to treat her as an OP later this week    Martine Mehta MD  [facility] Hematology [address] [place] [address]

## 2019-04-10 NOTE — PROGRESS NOTES
Patient seen and examined  Chest: scant coarse BS B/L  CVS: S1, S2, RRR  ABD: Soft, NT, ND, BS+  EXT: No c/c, +edema     Imaging: Reviewed    Agree with DC plan  Clinically improving with Abx  Can consider outpt bronch if ok with Cosme Slater Dr

## 2019-04-10 NOTE — PROGRESS NOTES
Pulmonary Progress Note        NAME: America Musa - ROOM: [facility]- - MRN: [id number] - Age: 68year old - : 1945        Last 24hrs: No events overnight, hopeful to d/c home today    OBJECTIVE:   04/09/19  2016 04/10/19  0014 04/10/19  0436 04/10 04/09/2019 07:32 AM 1.3 (L) 3.4 - 5.0 g/dL Final         Imaging: chest x-ray reviewed- improved bibasilar infiltrates    ASSESSMENT/PLAN:  1.  Cough/Dyspnea  -suspect PNA +/- pulmonary edema  -not requiring O2  -cont supportive care while continuing to t

## 2019-04-10 NOTE — CM/SW NOTE
04/09/19 1900   CM/SW Referral Data   Referral Source Physician   Reason for Referral Discharge planning   Informant Patient   Pertinent Medical Hx   Primary Care Physician Name Shaye Ruiz   Patient Info   Patient's Mental Status Alert;Oriented   Jam Mendez

## 2019-04-10 NOTE — PLAN OF CARE
PROBLEM: PNA      ASSESSMENT: Pt is A&OX4. VSS, afebrile. Maintaining O2 sats >94% on RA. . Tele, NSR, ST with exertion. Voids, tolerating diet no c/o n/v/d this shift, pt does c/o decreased appetite, wctm. Denies pain. PJ hose on.  IVF, continuing IV a to post-acute care or home with appropriate resources  Description  INTERVENTIONS:  - Conduct assessment to determine patient/family and health care team treatment goals, and need for post-acute services based on payer coverage, community resources, and pa

## 2019-04-10 NOTE — PROGRESS NOTES
NURSING DISCHARGE NOTE    Discharged Home via Wheelchair. Accompanied by Support staff  Belongings Taken by patient/family. Pt discharged home. Discharge instructions reviewed with pt and daughter at bedside. Prescriptions sent to pharmacy.  Pt inst

## 2019-04-11 NOTE — CM/SW NOTE
Patient discharged on 4/10/19 with no further needs identified.        04/11/19 0800   Discharge disposition   Expected discharge disposition Home or Self   Home services after discharge Patient refused services  ([facility]   Discharge transportation Pr

## 2019-04-11 NOTE — PROGRESS NOTES
Pt here for C1D1.   Arrives Via wheelchair, accompanied by Family member           Modifications in dose or schedule: No     Frequency of blood return and site check throughout administration: Prior to administration and At completion of therapy   Discharge

## 2019-04-11 NOTE — DISCHARGE SUMMARY
[facility] HOSPITAL[facility]  DISCHARGE SUMMARY     Michelle Meade Patient Status:  Inpatient    1945 MRN [id number]   [facility] 5NW-A Attending No att. providers found   Hosp Day # 4 PCP Scott Spearing     Date of Admission: 2019  Date of constipation or chest pain. She also reports a subjective fever and chills at home as well. She came to the ER for further evaluation. States that while in [place] her legs are also swollen.     Brief Synopsis: Patient was placed on broad-spectrum IV ant Instructions Prescription details   IRON OR      Take 2 tablets by mouth daily. Refills:  0     IRON-B12-VITAMINS OR      Take by mouth. Refills:  0     MULTI-VITAMIN/MINERALS Tabs      Take 1 tablet by mouth daily.    Refills:  0     omega-3 fatty acid

## 2019-04-11 NOTE — PROGRESS NOTES
Chemotherapy Education    Learner:  Patient and Family Member    Barriers / Limitations:  Pain    Chemotherapy education goals:  · Learn the drug names  · Administration schedule  · Routes of administration  · Treatment setting    Drug names:  Irinotecan a Achieved    Notify MD/RN of any changes or problems:  Achieved    Comments:    Treatment Effects on Emotional Status:    Potential mood changes, depression, nervousness, difficulty sleeping:  Achieved    Importance of support system:  Achieved    Notify MD Iron deficiency anemia     Rectal cancer (HCC)     Secondary carcinoma of liver (HCC)     Liver metastases ([facility] [address]     Secondary hypertension     Thrombocytopenia ([facility] [address]     Community acquired pneumonia     Community acquired pneumonia, unspecified laterality Packs/day: 0.50        Years: 18.00        Pack years: 5        Quit date: 1970        Years since quittin.7      Smokeless tobacco: Never Used    Substance and Sexual Activity      Alcohol use: Yes        Comment: 2 per month red wine      Drug u comprehensive 14 point review of systems was completed. Pertinent positives and negatives noted in the the HPI. Performance Status: ECOG 2    Physical Examination:  General: Patient is alert and oriented x 3, coughing   Vital Signs:    Oncology Vitals 4 Result Value Ref Range    WBC 13.8 (H) 4.0 - 11.0 x10(3) uL    RBC 4.16 3.80 - 5.30 x10(6)uL    HGB 11.6 (L) 12.0 - 16.0 g/dL    HCT 35.6 35.0 - 48.0 %    .0 150.0 - 450.0 10(3)uL    MCV 85.6 80.0 - 100.0 fL    MCH 27.9 26.0 - 34.0 pg    MCHC 32. 6 Group Joel Diaz ANP-BC  Nurse Practitioner  [facility]  [facility]

## 2019-04-12 NOTE — TELEPHONE ENCOUNTER
Date of Treatment: 4/11/19                                Type of Chemo: Irinotecan/Erbitux    Comments: Pt states \"overall she is doing well\". Appetite and fluid intake fair. Denies n/v/d. BLE edema- unchanged from yesterday.  Started taking Torsemide 1

## 2019-04-12 NOTE — CDS QUERY
Pneumonia Specificity  CLINICAL DOCUMENTATION CLARIFICATION FORM    Dear Doctor Rubi Spain information (provided below) indicates pneumonia.  For accurate ICD-10-CM code assignment to reflect severity of illness and risk of mortality,   PLEASE (X) AL

## 2019-04-13 NOTE — PROGRESS NOTES
SW spoke with patient while she was receiving chemo on 4/11. SW discussed palliative care and patient said she did not remember talking with Lori Esparza NP, about the services. SW expalined and patient declined at this time.   Patient says she was involved with

## 2019-04-15 NOTE — TELEPHONE ENCOUNTER
Patient asking if we will give her IV Vitamin C, she received in [place] and she felt so much better. Explained we do not do this but gave her the [address] [place] [address] or [facility] as places that do offer this.

## 2019-04-18 NOTE — PROGRESS NOTES
Pt here for C1D8 MD follow up visit. Pt states \"I am feeling much better compared to the past week\". Energy level and appetite improving. Abdominal distention and BLE edema has improved. Denies bowel problems. Denies pain.      Education Record    Learner

## 2019-04-18 NOTE — PROGRESS NOTES
Education Record    Learner:  Patient    Disease / Diagnosis:    Barriers / Limitations:  None   Comments:    Method:  Brief focused, Discussion and Reinforcement   Comments:    General Topics:  Medication, Side effects and symptom management, Plan of care

## 2019-04-23 NOTE — PROGRESS NOTES
[facility]    PATIENT'S NAME: Anabella Becky   ATTENDING PHYSICIAN: Robert Rivas M.D.    PATIENT ACCOUNT #: [de-identified] LOCATION: [address] [place] [address] RECORD #: [id number] YOB: 1945   DATE OF SERVICE: 04/18/2019       CANCER [facility] 7.3, hemoglobin 11.4, platelets 654. Her alkaline phosphatase jumped from 515 to 1015; however, her AST is down from 131 to 95, ALT is slightly up at 81.   Her total bilirubin remains normal.  Her albumin has increased from 1.3 to 2.0.  BUN is 11, creatini

## 2019-04-25 NOTE — PATIENT INSTRUCTIONS
For  nurse line, call [phone] with any questions or concerns,  Monday through Friday 8:00 to 4:30.      After hours or weekends for urgent needs: [phone].   Central [phone]  Medical Records:   [address]

## 2019-04-25 NOTE — PROGRESS NOTES
Patient is here for MD f/u rectal cancer. Patient c/o frequent loose stools. Mild cramping in pelvis in the past few days. Abdominal ascites is improving. Appetite is fair. Patient will be starting vitamin C infusions on May 8th. Mild fatigue.        Pam Fat

## 2019-04-25 NOTE — PROGRESS NOTES
Pt here for C1D15.   Arrives Ambulating independently, accompanied by Self           Modifications in dose or schedule: No     Frequency of blood return and site check throughout administration: Prior to administration and At completion of therapy   Dischar

## 2019-04-29 NOTE — TELEPHONE ENCOUNTER
Patient requesting her chemo appointment be changed from 5/9 to 5/14 as this is Mother's Day weekend and she needs to work. Will ask PSR to call and confirm.

## 2019-04-30 NOTE — PROGRESS NOTES
[facility]    PATIENT'S NAME: Gonzalo Costello   ATTENDING PHYSICIAN: Richard Aburto M.D.    PATIENT ACCOUNT #: [de-identified] LOCATION: [address] [place] [address] RECORD #: [id number] YOB: 1945   DATE OF SERVICE: 04/25/2019       CANCER CENT acute distress. VITAL SIGNS:  Performance status is 1. Weight 104 pounds, blood pressure 138/79, pulse 73, respiratory rate 20, temperature is 98.5. HEENT:  Unremarkable. She has pink conjunctivae, anicteric sclerae. Pharynx without lesions.   Gloria Bustos

## 2019-05-14 NOTE — PROGRESS NOTES
Pt here for C2D1.   Arrives Ambulating independently, accompanied by Self           Modifications in dose or schedule: No.     Frequency of blood return and site check throughout administration: Prior to administration and At completion of therapy   Dischar

## 2019-05-14 NOTE — PROGRESS NOTES
ANP Visit Note    Patient Name: Priti Leon   YOB: 1945   Medical Record Number: [id number]   CSN: [id number]   Date of visit: 5/14/2019       Chief Complaint/Reason for Visit:  Patient presents with:   Follow - Up: APN assessment prior to ERCP,DIAGNOSTIC     • FLEXIBLE SIGMOIDOSCOPY N/A 12/11/2018    Performed by Rhett Lyon MD at [facility] ENDOSCOPY   • HERNIA SURGERY  2002    right inguinal   • REMOVAL GALLBLADDER     • REPAIR ING HERNIA,5+Y/O,REDUCIBL         Allergies:    Lalito Lewis [Elu on file        Emotionally abused: Not on file        Physically abused: Not on file        Forced sexual activity: Not on file    Other Topics      Concerns:        Not on file    Social History Narrative      Not on file      Medications:    Current Outp concentrations >100 ng/mL. It is recommended that physicians ask all patients who may be on biotin supplementation to stop biotin consumption at least 72 hours prior to collection of a new sample.   Glucose                                       Date: 04/25/ range: 15 - 37 U/L        Status: Final  ALT                                           Date: 04/25/2019  Value: 88*         Ref range: 13 - 56 U/L        Status: Final  Alkaline Phosphatase                          Date: 04/25/2019  Value: 846*        Ref Status: Final  RDW                                           Date: 04/25/2019  Value: 18.5*       Ref range: 11.0 - 15.0 %      Status: Final  RDW-SD                                        Date: 04/25/2019  Value: 59.6*       Ref range: 35.1 - 46.3 fL Final  ------------    Impression/Plan:    Metastatic Rectal Cancer: proceed with Cycle 2 , cetuximab 600 mg /m2 along with xezctchccb222lb /m2. Cetuximab dose has been slightly increased last two cycles to elicit a rash.  CEA has started to trend down R[place]ki [place]

## 2019-05-14 NOTE — PROGRESS NOTES
Patient presents with: Follow - Up: APN assessment prior to treatment    Pt is here for treatment; C2 D1 erbitux/irinotecan is expected. Pt reports she has had more fatigue over the last week - but relates this to an increased work schedule.  She has been

## 2019-05-30 NOTE — PROGRESS NOTES
Pt here for C2D8.   Arrives Ambulating independently, accompanied by            Modifications in dose or schedule: No     Frequency of blood return and site check throughout administration: Prior to administration   Discharged to Home, Ambulating independen

## 2019-05-31 NOTE — PROGRESS NOTES
[facility]    PATIENT'S NAME: Chau Granados   ATTENDING PHYSICIAN: Lisa Flanagan M.D.    PATIENT ACCOUNT #: [de-identified] LOCATION: [address] [place] [address] RECORD #: [id number] YOB: 1945   DATE OF SERVICE: 05/30/2019       CANCER [facility] not requiring any antiemetics; she does receive them intravenously here as a precaution prior to her chemo. Her other skin is slightly dry and slightly sun sensitive, but otherwise she is coping reasonably well with the cetuximab.     PHYSICAL EXAMINATION: unconventional therapies as well. I have told her on a number of occasions that I do not know that they are of any particular worth, but she is planning on continuing to do so.     Dictated By Amee Cortez M.D.  d: 05/30/2019 17:20:22  t: 05/31/2019 0

## 2019-06-13 NOTE — PROGRESS NOTES
MD f/u for rectal ca. Due for C3D1 erbitux/irinotecan. Reports good energy level. Denies N/V/C/D. Has loose stools, but is manageable. Receives IV vit c infusions once weekly and has hyperbaric treatment 2 times per week.  Has mild acne form rash to sides o

## 2019-06-13 NOTE — PROGRESS NOTES
Pt here for C 1 D 3 etoposide.   Arrives Ambulating independently, accompanied by Self           Modifications in dose or schedule: No     Frequency of blood return and site check throughout administration: Prior to administration and At completion of thera

## 2019-06-17 NOTE — PROGRESS NOTES
[facility]    PATIENT'S NAME: Consuelo Barkercorbin   ATTENDING PHYSICIAN: Pepper Castrejon M.D.    PATIENT ACCOUNT #: [de-identified] LOCATION: [address] [place] [address] RECORD #: [id number] YOB: 1945   DATE OF SERVICE: 06/13/2019       CANCER TAHIR distress. VITAL SIGNS:  Her performance status is 0. Her weight is 113 pounds, which is up. Blood pressure 138/85, pulse 73, respiratory rate 20, temperature 98.5. HEENT:  Unremarkable. She has pink conjunctivae, anicteric sclerae.   Pharynx is without

## 2019-06-27 NOTE — PROGRESS NOTES
Pt here for follow up and treatment. Pt complains of decreased appetite, fatigue and some loose stools. She has occasional lower abdominal pain. She states she takes 1/2 a gummy for her pain.

## 2019-06-27 NOTE — PROGRESS NOTES
Pt here for C3D15.   Arrives Ambulating independently, accompanied by            Modifications in dose or schedule: No     Frequency of blood return and site check throughout administration: Prior to administration   Discharged to Home, Ambulating independe

## 2019-06-27 NOTE — PROGRESS NOTES
ANP Visit Note    Patient Name: Aries Palacio   YOB: 1945   Medical Record Number: [id number]   CSN: [id number]   Date of visit: 6/27/2019       Chief Complaint/Reason for Visit:  Patient presents with:   Follow - Up  Metastatic Rectal Cancer FLEXIBLE SIGMOIDOSCOPY N/A 12/11/2018    Performed by Tiago Jimenez MD at [facility] ENDOSCOPY   • HERNIA SURGERY  2002    right inguinal   • REMOVAL GALLBLADDER     • REPAIR ING HERNIA,5+Y/O,REDUCIBL         Allergies:    Feraheme [Ferumoxyt*    PAIN, SHORTN Emotionally abused: Not on file        Physically abused: Not on file        Forced sexual activity: Not on file    Other Topics      Concerns:        Not on file    Social History Narrative      Not on file      Medications:    Current Outpatient [address] [place] [address] Recent Results (from the past 72 hour(s))   COMP METABOLIC PANEL (14)    Collection Time: 06/27/19 10:13 AM   Result Value Ref Range    Glucose 130 (H) 70 - 99 mg/dL    Sodium 139 136 - 145 mmol/L    Potassium 3.6 3.5 - 5.1 mmol/L    Chloride 106 98 - 11 pain: continue Gummy  4. Peripheral neuropathy: continues to improve  5.  Decreased appetite: continues mostly plant based diet but is eating fish and some chicken, encouraged protein      Emotional Well Being:  I have assessed the patient's emotional well-

## 2019-07-11 NOTE — PROGRESS NOTES
Pt here for C4D1.   Arrives Ambulating independently, accompanied by Self           Modifications in dose or schedule: No     Frequency of blood return and site check throughout administration: Prior to administration and At completion of therapy   Discharg

## 2019-07-11 NOTE — PROGRESS NOTES
Patient is here for MD f/u and cycle 4 of chemo. Patient developed loose bloody stools after last cycle. Mild swelling in bilateral lower extremities this has since resolved. Intermittent stomach cramping. No nausea or vomiting. Appetite is good.  Energy le

## 2019-07-16 NOTE — PROGRESS NOTES
[facility]    PATIENT'S NAME: Peter [place]   ATTENDING PHYSICIAN: Migdalia Varela M.D.    PATIENT ACCOUNT #: [de-identified] LOCATION: [address] [place] [address] RECORD #: [id number] YOB: 1945   DATE OF SERVICE: 07/11/2019       CANCER [facility] CT scan performed. Her current medications include clindamycin topical gel; iron, B12, and a multivitamin daily; omega-3 fatty acids; vitamin C 500 mg daily; and zinc orally.     PHYSICAL EXAMINATION:    GENERAL:  She is a well-developed, well-nourished fe able to stay on this combination, and at some point could we drop out the irinotecan and keep her on cetuximab maintenance alone in order to minimize side effects. Her tolerance, however, is actually quite good.   If she has more rectal bleeding or more lo

## 2019-07-25 NOTE — PROGRESS NOTES
ANP Visit Note    Patient Name: Dominic Santos   YOB: 1945   Medical Record Number: [id number]   CSN: [id number]   Date of visit: 7/25/2019       Chief Complaint/Reason for Visit:  Patient presents with:   Follow - Up  Metastatic Rectal Cancer Rony Curry MD at [facility] ENDOSCOPY   • HERNIA SURGERY  2002    right inguinal   • REMOVAL GALLBLADDER     • REPAIR ING HERNIA,5+Y/O,REDUCIBL         Allergies:    Feraheme [Ferumoxyt*    PAIN, SHORTNESS OF BREATH    Comment:Felt short of breath, like sh on file        Forced sexual activity: Not on file    Other Topics      Concerns:        Not on file    Social History Narrative      Not on file      Medications:    Current Outpatient Medications:   •  Vitamin C 500 MG Oral Tab, Take 500 mg by mouth alisha Chloride 105 98 - 112 mmol/L    CO2 27.0 21.0 - 32.0 mmol/L    Anion Gap 5 0 - 18 mmol/L    BUN 8 7 - 18 mg/dL    Creatinine 0.50 (L) 0.55 - 1.02 mg/dL    BUN/CREA Ratio 16.0 10.0 - 20.0    Calcium, Total 9.0 8.5 - 10.1 mg/dL    Calculated Osmolality 283 2 protein  6. Diarrhea: encouraged Imodium, she prefers dietary changes   7. Right inguinal hernia: continue to monitor. No interventions at this time. 8. Abdominal cramping: offered Levsin, she would prefer to use the Gummies.          Emotional Well Being

## 2019-08-13 NOTE — PROGRESS NOTES
Pt here for C5D1.   Arrives Ambulating independently, accompanied by Self           Patient denies possible pregnancy since last therapy cycle: na    Modifications in dose or schedule: No     Frequency of blood return and site check throughout administratio

## 2019-08-13 NOTE — PROGRESS NOTES
Patient is here for MD f/u and cycle 5 of chemo. Patient did a 21 mile bike ride on Sunday. Abdomen feels distended and c/o occasional discomfort. Frequent loose stools but resolves on its own. Weight loss of 2 lbs. Energy level is fairly good.        Jazz Nava

## 2019-08-14 NOTE — TELEPHONE ENCOUNTER
Test(s) completed: CEA  Results: 50.2- elevated   Plan: per Dr Lalito Sapp \"Her marker is starting to climb again.  Need to get CT prior to the next chemo.  Can you call her? Marcin Delgado has a vacation planned after that chemo.  Want to make sure we should still do i

## 2019-08-29 NOTE — PROGRESS NOTES
Patient is here for MD f/kiki and C5D15 of chemo. Patient noticed her right middle finger is red and hot to the touch for 3 days. She thinks she injured it at work. C/o right ear pain. Frequent loose stools. Appetite is good.  Energy level varies but overall o

## 2019-08-30 NOTE — PROGRESS NOTES
[facility]    PATIENT'S NAME: Aime Ruba   ATTENDING PHYSICIAN: Ronnie Bass M.D.    PATIENT ACCOUNT #: [de-identified] LOCATION: [address] [place] [address] RECORD #: [id number] YOB: 1945   DATE OF SERVICE: 08/29/2019       CANCER [facility] antiemetics. PHYSICAL EXAMINATION:    GENERAL:  She is a well-developed, thin female in no acute distress. VITAL SIGNS:  Her performance status is 1. Her weight is 116 pounds. Blood pressure 130/75, pulse 68, respiratory rate 20, temperature 98. 2.   H back tomorrow to actually get her chemotherapy.      Dictated By Tashia rPakash M.D.  d: 08/29/2019 17:20:38  t: 08/30/2019 11:05:56  [facility] [id number]  /    cc: Dr. Darline Prado

## 2019-08-30 NOTE — PROGRESS NOTES
Patient unable to come back in two weeks for treatment d/t vacation. Dr. Stefanie Hudson aware. Patient will return in 3 weeks for treatment instead.

## 2019-08-30 NOTE — PROGRESS NOTES
Pt here for C5D15.   Arrives Ambulating independently, accompanied by Self           Patient denies possible pregnancy since last therapy cycle: n/a    Modifications in dose or schedule: No     Frequency of blood return and site check throughout administrat

## 2019-09-06 PROBLEM — G45.9 TIA (TRANSIENT ISCHEMIC ATTACK): Status: ACTIVE | Noted: 2019-01-01

## 2019-09-06 NOTE — H&P
[facility] HOSPITALIST  History and Physical     Antonina Dean Patient Status:  Observation    1945 MRN [id number]   [facility] 7NE-A Attending Chela Frederick MD   Hosp Day # 0 PCP Harley Gunderson     Chief Complaint: RUE weakness    His and severe back pain    Medications:    No current facility-administered medications on file prior to encounter. Current Outpatient Medications on File Prior to Encounter:  NON FORMULARY Take 2 capsules by mouth daily.  Cannabis  Disp:  Rfl:    Julio 85.3   .0   INR 0.95       Recent Labs   Lab 09/06/19  1120   *   BUN 10   CREATSERUM 0.44*   GFRAA 116   GFRNAA 100   CA 9.3   ALB 2.5*      K 3.7      CO2 24.0   ALKPHO 428*   AST 42*   ALT 71*   BILT 0.3   TP 7.3       Estimate

## 2019-09-06 NOTE — PLAN OF CARE
Assumed care at 1700. Pt A/O x4. RA. NSR on tele. VSS. Neuro checks q2 until 0100. Pt with R drift, R ataxia, and abnormal  R shoulder shrug. Pt is Bed rest with BRP per Dr. Wilfredo De La Rosa. Echo completed. MRI/MRA completed. Pt and family updated on the POC.  C activity and self care with guidance from PT/OT  - Encourage visually impaired, hearing impaired and aphasic patients to use assistive/communication devices  Outcome: Progressing

## 2019-09-06 NOTE — ED INITIAL ASSESSMENT (HPI)
A/o x3, pt driving an hour ago and right arm went numb and tingling + weakness, resolved now, denies LE weakness or slurred speech or drooling. Pt had last chemo Friday.

## 2019-09-06 NOTE — ED PROVIDER NOTES
Patient Seen in: [facility] Emergency Department    History   Patient presents with:  Numbness Weakness (neurologic): right arm with acute loss of ability to move/heavy/numbness and tingling, now better. Some tingling of left fingers.     Stated Com 49.1      Smokeless tobacco: Never Used    Alcohol use: Yes      Comment: 2 per month red wine    Drug use: No             Review of Systems    Positive for stated complaint: TL-R arm weakness  Other systems are as noted in HPI.   Constitutional and vital s Result Value    Glucose 116 (*)     Creatinine 0.44 (*)     BUN/CREA Ratio 22.7 (*)     AST 42 (*)     ALT 71 (*)     Alkaline Phosphatase 428 (*)     Albumin 2.5 (*)     Globulin  4.8 (*)     A/G Ratio 0.5 (*)     All other components within normal limits study.  The remaining lesions are relatively stable. No new hepatic lesions are noted. 3. There is again evidence of diffuse retention of fecal debris throughout the colon.   However the prior suggested wall thickening of the rectosigmoid colon is not curr List              Present on Admission  Date Reviewed: 8/29/2019          ICD-10-CM Noted POA    TIA (transient ischemic attack) G45.9 9/6/2019 Unknown

## 2019-09-06 NOTE — PROGRESS NOTES
[id number] [facility] Preliminary Note    Doroteo Richard Patient Status:  Emergency    1945 MRN [id number]   Location [address] [facility] Attending Edd Curry MD   Hosp Day # 0 PCP Bessy CARTWRIGHT     REASON FOR CONS history of antineoplastic chemotherapy 11/2018   • Rectal cancer ([facility] [address]    • Wears glasses        PAST SURGICAL HISTORY:  Past Surgical History:   Procedure Laterality Date   • CHOLECYSTECTOMY     • COLONOSCOPY     • ERCP,DIAGNOSTIC     • FLEXIBLE SIGMOIDOS Alert  Orientation: oriented to person, place and time   Speech: fluent, no dysarthria  repetition and naming are normal  Follows commands   Cranial Nerves   CN II, III: Visual fields full, Pupils equal and reactive, 3 mm brisk  CN III, IV, VI: EOMI, no ny

## 2019-09-07 NOTE — DISCHARGE SUMMARY
[facility] HOSPITALIST  DISCHARGE SUMMARY     Jennifer Riggs Patient Status:  Observation    1945 MRN [id number]   [facility] 7NE-A Attending Alejandro John MD   Hosp Day # 0 TONO Cuenca     Date of Admission: 2019  Date of D 0        CONTINUE taking these medications      Instructions Prescription details   B-12 100 MCG Tabs      Take 1 capsule by mouth daily. Refills:  0     NON FORMULARY      Take 2 capsules by mouth daily.  Cannabis   Refills:  0     NON [address] [facility] [address] on the second floor.                     Vital signs:  Temp:  [97.7 °F (36.5 °C)-98.2 °F (36.8 °C)] 98.1 °F (36.7 °C)  Pulse:  [71-81] 75  Resp:  [16-18] 18  BP: (126-164)/(67-94) 164/82    -------------------------------------------------

## 2019-09-07 NOTE — PLAN OF CARE
Assumed patient care at 1900, patient alert and oriented X 4  Neuro Q 4 with right arm weakness and drift, otherwise intact  Patient back from MRI.    MRI + acute ischemic infarction, hospitalist and neuro notified, no new orders  Patient refusing lipitor,

## 2019-09-07 NOTE — SLP NOTE
ADULT SWALLOWING EVALUATION    ASSESSMENT    ASSESSMENT/OVERALL IMPRESSION:  Bedside swallow evaluation completed per CVA protocol. RN approved session. Per chart, pt admitted with RUE weakness, and MRI positive for CVA (see below).  Pt received lying in be glasses        Prior Living Situation: Home with spouse(home with life partner)  Diet Prior to Admission: Regular; Thin liquids       Patient/Family Goals: none stated    SWALLOWING HISTORY  Current Diet Consistency: Regular; Thin liquids  Dysphagia History: 09/09/19    Thank you for your referral.   If you have any questions, please contact Laure Griffin

## 2019-09-07 NOTE — PLAN OF CARE
Pt A/Ox4, RUE weakness noted, (+) drift, neuro q 4  On RA, NSR per tele, denies any pain  Pt started on plavix, aspirin decreased to 81 mg   PLAN: Anxious to be discharged-- Carotid US ordered (this RN called multiple times to see if pt could get this done

## 2019-09-07 NOTE — PROGRESS NOTES
[facility] HOSPITALIST  Progress Note     Johnathan Lima Patient Status:  Observation    1945 MRN [id number]   [facility] 7NE-A Attending Silva Best MD   Hosp Day # 0 PCP Yoan Mata     Chief Complaint: RUE weakness    S: Daria Boone • Senna  17.2 mg Oral Nightly   • docusate sodium  100 mg Oral BID   • famoTIDine  20 mg Oral Daily    Or   • famoTIDine  20 mg Intravenous Daily       ASSESSMENT / PLAN:     1. Acute CVA  1. Neuro c/s  2. Stroke protocol   3. Asa, refuse statin  4.  LDL

## 2019-09-07 NOTE — CONSULTS
Neurology H&P    Jennifer Pro Patient Status:  Observation    1945 MRN [id number]   [facility] 7NE-A Attending Alejandro John MD   Hosp Day # 0 PCP Nadia Schaeffer     Subjective:   This is a 68year old female with history of metas to liver ([facility] [address]     Anemia of chronic disease     Transaminitis     Acute CVA (cerebrovascular accident) [facility])      PMHx:  Past Medical History:   Diagnosis Date   • Abdominal hernia    • Anesthesia complication     Slow to wake up from anesthesia   • Belch PERRLA, no ptosis or diplopia, EOM intact, facial sensation intact, strong eye closure, face is symmetric, no dysarthria, tongue midline,  no tongue fasciculations or atrophy, strong shoulder shrug.     MOTOR EXAMINATION: normal tone, no fasciculations, nor matter. 3. No evidence of intracranial aneurysm, flow-limiting stenosis, or focal arterial occlusion. 4. Approximately 50% stenosis in the left carotid bulb by NASCET criteria.     [facility] 9/6/19     =====  CONCLUSION:  Normal for age    TTE 9/6/19  Con has no stenosis >70 she will likely be ok for discharge today      Plan:  1. L Frontal and L parietal lobe stroke  - Small vessel disease vs possibly hypercoagulable state due to CA  - , Reports statin allergy and declines any statin medications  -

## 2019-09-08 NOTE — PLAN OF CARE
Pt discharged home with  to drive. Pt asked about flying per plane tomorrow for a 4 day vacation. This RN did ask Dr Renee White if this was ok . The pt was told yes. She was given instr for meds, d/c plan, deneen gallo reminder to read.  No c/o and in

## 2019-09-08 NOTE — PLAN OF CARE
Assumed care at [address] [place] [address]. Pt A&O X 4. Ambulated in hallway with family, gait steady, tolerated well. Neuro checks q 4 hours. Continues to have mild right arm weakness. Pt states significantly improved.    Received call from Dr. Brittany Salazar last night stating if Monitor swallowing and airway patency with patient fatigue and changes in neurological status  - Encourage and assist patient to increase activity and self care with guidance from PT/OT  - Encourage visually impaired, hearing impaired and aphasic patients

## 2019-09-08 NOTE — OCCUPATIONAL THERAPY NOTE
OCCUPATIONAL THERAPY QUICK EVALUATION - INPATIENT    Room Number: 7153/1317-V  Evaluation Date: 9/8/2019     Type of Evaluation: Initial  Presenting Problem: CVA    Physician Order: IP Consult to Occupational Therapy  Reason for Therapy:  ADL/IADL Dysfunct RESTRICTION  Weight Bearing Restriction: None                PAIN ASSESSMENT  Ratin  Location: no pain       COGNITION  WNL    RANGE OF MOTION AND STRENGTH ASSESSMENT  Upper extremity ROM is within functional limits     Upper extremity strength is with all ADL tasks, functional transfers, dynamic reaching and functional mobility safely, without loss of balance, and at supervision level or above. Patient reports no further questions or concerns about safe return to I/ADL tasks.  No further OT services need

## 2019-09-08 NOTE — PROGRESS NOTES
[facility] HOSPITALIST  Progress Note     Jewell Hurtado Patient Status:  Observation    1945 MRN [id number]   [facility] 7NE-A Attending Marian Winter MD   Hosp Day # 0 PCP Shikha Vega     Chief Complaint: RUE weakness    S: Fernanda Marin Oral Nightly   • docusate sodium  100 mg Oral BID   • famoTIDine  20 mg Oral Daily    Or   • famoTIDine  20 mg Intravenous Daily       ASSESSMENT / PLAN:     1. Acute CVA  1. Neuro c/s  2. Stroke protocol   3. Asa, refuse statin  4.  A1c 6  5.  Permi

## 2019-09-08 NOTE — PHYSICAL THERAPY NOTE
PHYSICAL THERAPY QUICK EVALUATION - INPATIENT    Room Number: 1297/6441-Q  Evaluation Date: 9/8/2019  Presenting Problem: stroke  Physician Order: PT Eval and Treat    Problem List  Principal Problem:    TIA (transient ischemic attack)  Active Problems: commands, recalls short term/long term events                   ACTIVITY TOLERANCE  Pulse: 93  Heart Rate Source: Monitor     BP: 138/89  BP Location: Right arm  BP Method: Automatic  Patient Position: Sitting    O2 WALK     SPO2 Ambulation on Room Air: 10 old female admitted on 9/6/2019 for RUE weakness found to have L fronal parietal lobe stroke. Pertinent comorbidities and personal factors impacting therapy include metastatic colon CA to liver.   Functional outcome measures completed include [facility] in [facility]

## 2019-09-09 NOTE — TELEPHONE ENCOUNTER
TIA CLINIC SCREENING    1. Date of ED visit/TIA diagnosis:  09/06/2019   Time of discharge from ED:  Admitted at 1635    2. Is patient currently admitted? No   If YES, skip to #7 - TIA Clinic Appointment not required.     3. Does patient already see an JOSEPH

## 2019-09-11 NOTE — TELEPHONE ENCOUNTER
Called patient to set up HFU appt per post dc f/u order. Patient is hiking in [place] currently. Asked if she could get in to see Dr. Anival Dotson next week Tuesday or Friday. Spoke to Dr. Gianfranco Allred office to schedule.  They'll have a nurse cb with appt

## 2019-09-11 NOTE — TELEPHONE ENCOUNTER
Spoke with Marianne Wang with [facility] inpatient scheduling. Informed her 9/23/2019 appointment is on hold for patient and can be held until end of business on 9/11/2019. Marianne Wang will call JOSEPH back to confirm with PSRs.

## 2019-09-16 NOTE — TELEPHONE ENCOUNTER
Heather Bernabe called to speak with Vic Kasper prior to her next treatment. She has concerns that she would liketo go over with her.  Please advise

## 2019-09-16 NOTE — TELEPHONE ENCOUNTER
Called and spoke with pt about her concerns. Pt states that after last chemo it took her 5 days to recover. She also was just in the hospital for mini stroke. Pt was driving and right arm fell and went limp.  Pt states that everything was clear but was told

## 2019-09-19 NOTE — PROGRESS NOTES
Patient is here for MD f/u and cycle 6 of chemo. Patient had stomach cramping a few days ago. Appetite is down most days. Diarrhea for the first few days after chemo. Ongoing intermittent headaches. No visual changes. Temp today 100.1.  She just returned fr

## 2019-09-19 NOTE — PROGRESS NOTES
Pt here for C6D1.   Arrives Ambulating independently, accompanied by Self           Patient denies possible pregnancy since last therapy cycle: Not Applicable    Modifications in dose or schedule: No     Frequency of blood return and site check throughout a

## 2019-09-23 NOTE — PROGRESS NOTES
Neurology H&P    Doroteo Richard Patient Status:  No patient class for patient encounter    1945 MRN [id number]   Location [address] [facility], [address] [place] [address], [address] [place] [address] Attending No att. providers found   Hosp Day # 0 PCP Raul Minaya [facility])     Secondary hypertension     Thrombocytopenia ([facility] [address]     Community acquired pneumonia     Community acquired pneumonia, unspecified laterality     Hypoxia     Other ascites     Hypoalbuminemia     Bilateral lower extremity edema     TIA (transient i CTAB  GI: non-tender, normal bowel sounds  Skin: normal, dry  Extremities: No clubbing or cyanosis      Neurologic:   MENTAL STATUS: alert, ox3, normal attention, language and fund of knowledge.       CRANIAL NERVES II to XII: PERRLA, no ptosis or diplopia, She was started on Plavix 75mg and ASA 81mg Qday for secondary stroke prevention. Plan is to stop Plavix on 9/28/19 and continue with ASA 325mg Qday alone for secondary Stroke prevention.        Plan:  1. L Frontoparietal stroke - RUE weakness and numbness

## 2019-09-24 NOTE — ED PROVIDER NOTES
Patient Seen in: [facility] Emergency Department      History   Patient presents with:  GI Bleeding (gastrointestinal)    Stated Complaint: per call in info: Pt on plavix, c/o rectal bleeding with bright red blood start*    HPI    This is a 73-year- start*  Other systems are as noted in HPI. Constitutional and vital signs reviewed. All other systems reviewed and negative except as noted above.     Physical Exam     ED Triage Vitals [09/24/19 1305]   /81   Pulse 84   Resp 18   Temp 98 °F (36 limits   PTT, ACTIVATED - Normal   PROTHROMBIN TIME (PT) - Normal   CBC WITH DIFFERENTIAL WITH PLATELET    Narrative: The following orders were created for panel order CBC WITH DIFFERENTIAL WITH PLATELET.   Procedure                               Abnorm discussed this that we can keep her in the hospital she does not want to be admitted. The patient case was also subsequent discussed with Dr. Zach Reveles. I have also discussed this case with [facility].   They will closely follow-up as an outpatient I discu

## 2019-09-24 NOTE — PROGRESS NOTES
[facility]    PATIENT'S NAME: Adilene Steele   ATTENDING PHYSICIAN: Amee Cortez M.D.    PATIENT ACCOUNT #: [de-identified] LOCATION: [address] [place] [address] RECORD #: [id number] YOB: 1945   DATE OF SERVICE: 09/19/2019       CANCER [facility] was seen in the hospital from September 6 to September 8. She then left and went on her hike and did fine with no further occurrences. She is here today for her next cycle of treatment.   She did have an interval of approximately 3 weeks because of her tr agent.  The next two options include the use of Lonsurf or the use of Stivarga. Both of these have very low response rates. Her questions were answered. I will see her back in 2 weeks.       Dictated By Sharrie Councilman, M.D.  d: 09/23/2019 13:10:42  t:

## 2019-09-24 NOTE — TELEPHONE ENCOUNTER
Toxicities: C6D1 Cetuximab/Irinotecan HCL on 9/19/2019     Pt saw Dr Kaveh Juarez for [address] [facility] [address] on 9/19/2019    Pt of Dr Cale Robertson (Neurology) on Clopidogrel 75mg daily & Aspirin 81mg daily for acute cva on 9/6/2019    Bloody Stool    Bloody Stool: (Pt had a soft BM with bright red blood at 11:30pm. She has since had 2 more soft BM's with bright red blood. More blood seen on the 3rd stool. Denies passing any clots. Denies feeling light headed or dizzy.)    Pt instructed to go to the [facility] ER for evaluation of rectal bleeding. She agreed with the plan.  Report called to Nancy [facility] Charge RN  ETA 45 min

## 2019-09-24 NOTE — ED NOTES
Pt resting on cart, Dr Ginny Caputo at bedside discussing plan for D/C. Pt is alert- skin p/w/d, breathing non labored and with ease. Updated to plan for discharge. IV removed with cath intact and bandage placed. DC papers given to patient.  Aware of f/u care an

## 2019-09-24 NOTE — ED INITIAL ASSESSMENT (HPI)
Pt started on plavix 3 weeks ago and last night she noticed bright red blood in her stool and this morning. pt has been having loose stools, no straining with bm.  No n/v.

## 2019-09-24 NOTE — TELEPHONE ENCOUNTER
Patient called stating she is having blood in her stool. She had chemo last week and this is the first time she has had this issue.

## 2019-09-25 NOTE — TELEPHONE ENCOUNTER
----- Message from Sharrie Councilman, MD sent at 9/24/2019  5:50 PM CDT -----  She was in the ER today. rectal bleeding - stopped and Hgb was stable. Needs repeat labs on Weds. CBC, BMP - please call her and arrange.   Rene      Held blood thinner since l

## 2019-09-25 NOTE — TELEPHONE ENCOUNTER
Pt in ER on 09/24/2019 for rectal bleeding. Pt states there was copious amounts of bright red blood. She states bleeding stopped after last BM at \"around 1:15pm.\"    She states she has not taken Plavix since but is continuing to take 81mg Aspirin.     Per

## 2019-09-25 NOTE — TELEPHONE ENCOUNTER
Please stop plavix. Per ED note Hematology and [facility] aware of previous bleeding and will be following her. Ok for  Aspirin 81mg. Plan per chart is for repeat CBC to follow her blood counts. She should go to ED with any new bleeding.

## 2019-09-25 NOTE — TELEPHONE ENCOUNTER
Spoke with pt, relayed note below. She expressed understanding and was encouraged to call office with any further questions or concerns.

## 2019-09-26 NOTE — PROGRESS NOTES
Education Record    Learner:  Patient    Disease / Diagnosis:  Breast Cancer/Port Draw    Barriers / Limitations:  None   Comments:    Method:  Brief focused   Comments:    General Topics:  Plan of care reviewed   Comments:    Outcome:  Shows understanding

## 2019-10-02 NOTE — PROGRESS NOTES
[facility] Office Note  Chief Complaint:   Patient presents with:  CVA: sees DR Efren Collins, post mini stroke      HPI:   This is a 68year old female with a past medical history of colon and rectal cancer with metastasis to liver an Social History:  Social History    Tobacco Use      Smoking status: Former Smoker        Packs/day: 0.50        Years: 18.00        Pack years: 5        Quit date: 1970        Years since quittin.2      Smokeless tobacco: Never Used    Alcohol Denies hearing loss, sneezing, congestion, runny nose or sore throat. CARDIOVASCULAR:  Denies chest pain, chest pressure or chest discomfort. No palpitations or edema.   Denies any dyspnea on exertion or at rest  RESPIRATORY:  Denies shortness of breath, c hematology/oncology  -  Further recs per Dr. Walker Se for this Visit:  Requested Prescriptions      No prescriptions requested or ordered in this encounter       Health Maintenance:  Annual Physical due on 12/12/1948  Mammogram due on 12/

## 2019-10-03 NOTE — PROGRESS NOTES
Pt here for C6D15 Erbitux/Irinotecan.   Arrives Ambulating independently, accompanied by Self           Patient denies possible pregnancy since last therapy cycle: Not Applicable    Modifications in dose or schedule: no     Frequency of blood return and sit

## 2019-10-03 NOTE — PROGRESS NOTES
ANP Visit Note    Patient Name: Pam Mcghee   YOB: 1945   Medical Record Number: [id number]   CSN: [id number]   Date of visit: 10/3/2019   KIM MEIER, DO   No primary care provider on file.      Chief Complaint/Reason for Visit:  Pa chemotherapy 11/2018   • Rectal cancer [facility])    • TIA (transient ischemic attack) 08/2019   • Wears glasses        Surgical History:  Past Surgical History:   Procedure Laterality Date   • CHOLECYSTECTOMY     • COLONOSCOPY     • COLONOSCOPY     • ERCP,DIAG together: Not on file        Attends Synagogue service: Not on file        Active member of club or organization: Not on file        Attends meetings of clubs or organizations: Not on file        Relationship status: Not on file      Intimate partner viole 1000 MG Oral Cap, Take 1,000 mg by mouth. Daily or as directed , Disp: , Rfl:     [place]arcotic [place] reviewed in [place]     Review of Systems:  A comprehensive 14 point review of systems was completed.   Pertinent positives and negatives noted in the the 4.0 - 11.0 x10(3) uL    RBC 4.16 3.80 - 5.30 x10(6)uL    HGB 11.2 (L) 12.0 - 16.0 g/dL    HCT 35.8 35.0 - 48.0 %    .0 150.0 - 450.0 10(3)uL    MCV 86.1 80.0 - 100.0 fL    MCH 26.9 26.0 - 34.0 pg    MCHC 31.3 31.0 - 37.0 g/dL    RDW 16.3 (H) 11.0 -

## 2019-10-03 NOTE — PROGRESS NOTES
Patient is here for APN assessment and C6D15 of chemo. Patient woke up in the middle of the night last week with bright red blood after a bowel movement. Patient went to the ER. Plavix was discontinued at that time. Patient continues on Aspirin.  Patient is

## 2019-10-15 RX ORDER — CLOPIDOGREL BISULFATE 75 MG/1
75 TABLET ORAL
COMMUNITY
Start: 2019-09-07

## 2019-10-15 RX ORDER — PYRIDOXINE HCL (VITAMIN B6) 50 MG
1 TABLET ORAL
COMMUNITY

## 2019-10-15 RX ORDER — FLUTICASONE PROPIONATE 50 MCG
2 SPRAY, SUSPENSION (ML) NASAL
COMMUNITY
Start: 2014-07-07

## 2019-10-15 RX ORDER — CHLORAL HYDRATE 500 MG
CAPSULE ORAL
COMMUNITY

## 2019-10-15 RX ORDER — LORATADINE 10 MG/1
10 TABLET ORAL
COMMUNITY
Start: 2014-07-07

## 2019-10-15 RX ORDER — CHLORAL HYDRATE 500 MG
1000 CAPSULE ORAL
COMMUNITY

## 2019-10-15 RX ORDER — RED YEAST RICE EXTRACT
POWDER (GRAM) MISCELLANEOUS
COMMUNITY
Start: 2012-01-10

## 2019-10-15 RX ORDER — ASCORBIC ACID 500 MG
500 TABLET ORAL
COMMUNITY

## 2019-10-15 RX ORDER — ASPIRIN 81 MG/1
81 TABLET ORAL
COMMUNITY
Start: 2019-09-08

## 2019-10-15 RX ORDER — MULTIVITAMIN WITH FOLIC ACID 400 MCG
1 TABLET ORAL
COMMUNITY

## 2019-10-15 RX ORDER — ALBUTEROL SULFATE 90 UG/1
2 AEROSOL, METERED RESPIRATORY (INHALATION)
COMMUNITY
Start: 2013-04-19

## 2019-10-15 RX ORDER — RIBOFLAVIN (VITAMIN B2) 100 MG
100 TABLET ORAL
COMMUNITY

## 2019-10-16 ENCOUNTER — APPOINTMENT (OUTPATIENT)
Dept: CARDIOLOGY | Age: 74
End: 2019-10-16

## 2019-10-22 NOTE — PROGRESS NOTES
ANP Visit Note    Patient Name: Gianfranco Dean   YOB: 1945   Medical Record Number: [id number]   CSN: [id number]   Date of visit: 10/22/2019   KIM MEIER, DO   No primary care provider on file.      Chief Complaint/Reason for Visit:  Guillermina Garcia History:  Past Medical History:   Diagnosis Date   • Abdominal hernia    • Anesthesia complication     Slow to wake up from anesthesia   • Belching    • Cancer (HCC)    • Disorder of liver     Mets to liver   • Hemorrhoids    • Night sweats    • Personal h Comment: medical      Sexual activity: Not Currently    Lifestyle      Physical activity:        Days per week: Not on file        Minutes per session: Not on file      Stress: Not on file    Relationships      Social connections:        Talks on phone: No Take by mouth nightly. Medical marijuana gummie 1 by mouth at bedtime to sleep, Disp: , Rfl:   •  Vitamin C 500 MG Oral Tab, Take 500 mg by mouth daily. , Disp: , Rfl:   •  ZINC OR, Use as directed 1 capsule in the mouth or throat daily.   , Disp: , Rfl:   • Alkaline Phosphatase 423 (H) 55 - 142 U/L    Bilirubin, Total 0.5 0.1 - 2.0 mg/dL    Total Protein 7.2 6.4 - 8.2 g/dL    Albumin 2.4 (L) 3.4 - 5.0 g/dL    Globulin  4.8 (H) 2.8 - 4.4 g/dL    A/G Ratio 0.5 (L) 1.0 - 2.0   CBC W/ DIFFERENTIAL    Collection T problems.     Planned Follow Up:  11/7 Labs, Dr Kena Doss, Chemo     Risk Level: High: Metastatic Rectal Cancer     Electronically Signed by:    Pallavi Villalpando ANP-BC  Nurse Practitioner  [facility]

## 2019-10-22 NOTE — PROGRESS NOTES
Pt here for C7D1.   Arrives Ambulating independently, accompanied by Self        Modifications in dose or schedule: No     Frequency of blood return and site check throughout administration: Prior to administration   Discharged to Home, Ambulating independe

## 2019-11-07 NOTE — PROGRESS NOTES
Pt here for C7D15.   Arrives Ambulating independently, accompanied by Self           Patient denies possible pregnancy since last therapy cycle: Not Applicable    Modifications in dose or schedule: No     Frequency of blood return and site check throughout

## 2019-11-07 NOTE — PROGRESS NOTES
[facility]    PATIENT'S NAME: Philomena Lee   ATTENDING PHYSICIAN: Ginger Delgado M.D.    PATIENT ACCOUNT #: [de-identified] LOCATION: [address] [place] [address] RECORD #: [id number] YOB: 1945   DATE OF SERVICE: 11/07/2019       CANCER [facility] no palpable cervical, supraclavicular, or axillary nodes. LUNGS:  Resonant to percussion and clear to auscultation. No wheezing, rales, or rhonchi. HEART:  Regular S1 and S2, with no murmur or gallop. ABDOMEN:  No hepatosplenomegaly or tenderness.   EXT

## 2019-11-07 NOTE — PROGRESS NOTES
Pt here for MD f/u visit and due for chemotherapy for rectal ca. Energy level very good. Appetite fair. Occasional diarrhea if eats to much sweets. Patient is doing Vitamin C infusions once weekly.      Education Record     Learner:  Patient     Disease / D

## 2019-11-18 NOTE — TELEPHONE ENCOUNTER
Nakita Ham called to let JULIA Pratt know that she has a cold. Her  has been sick. On Saturday she started feeling chilled. She has a runny nose, post nasal drip & a dry non-productive cough. She does not want to have treatment this Thurdsday.

## 2019-11-19 NOTE — PROGRESS NOTES
HPI:   Jennifer Riggs is a 68year old female who presents for upper respiratory symptoms for  2  days. Patient reports congestion, prior history of pneumonia, deep/harsh cough - unsure if productive, denies fever, denies sinus pain.     Current Outpatient status: Former Smoker        Packs/day: 0.50        Years: 18.00        Pack years: 5        Quit date: 1970        Years since quittin.3      Smokeless tobacco: Never Used    Alcohol use: Yes      Comment: 2 per month red wine    Drug use:  Yes

## 2019-11-19 NOTE — TELEPHONE ENCOUNTER
Patient called with bad URI, has an appointment with Dr Sidney Montemayor this afternoon, would like to postpone chemo this week as she did not tolerate the last cycle very well with a lot of side effects.

## 2019-11-20 NOTE — TELEPHONE ENCOUNTER
Pt was diagnosed with bronchitis, she is cancelling appointment for this week, she does not want treatment next week because of the holiday and she refused the following week because she has a big event, she wouls like treatment on 12/10.   Will rescchedule

## 2019-11-20 NOTE — TELEPHONE ENCOUNTER
Patient states that she do not want to do Chemo on 11/21/19. She has Bronchitis and on medication and want to stay at home, Zulma Pena would like to discuss this matter with you, please call patient.

## 2019-11-22 ENCOUNTER — TELEPHONE (OUTPATIENT)
Dept: CARDIOLOGY | Age: 74
End: 2019-11-22

## 2019-11-25 NOTE — TELEPHONE ENCOUNTER
Pt was seen 11/19 and was told she has bronchitis and she finished the antibiotics but she still has a deep cough. Please advise.

## 2019-11-29 NOTE — TELEPHONE ENCOUNTER
See note below, pt of Dr Su Roman, chest xray 11/26/19, pt states she feels very weak and did not go to work today. Denies fever or wheezing.

## 2019-11-29 NOTE — TELEPHONE ENCOUNTER
Pt instructed to go to Urgent care for eval. And pt refused. Pt requesting more antibiotic and tried to explain to pt she needs to be seen. Pt states she is going to the pharmacy and get something OTC, call ended.

## 2019-11-29 NOTE — TELEPHONE ENCOUNTER
Pt was seen 11/19/19 with bronchitis, she is still coughing very bad. She would like to know what she can do. Please advise. Thank you.

## 2019-12-05 NOTE — TELEPHONE ENCOUNTER
Gladis Forward called to touch base with Karen Mancera, and let her know a few things she has planned, and to ask for her opinion.  Please call

## 2019-12-06 NOTE — TELEPHONE ENCOUNTER
Has a virus she can not get rid of. CT scheduled for Monday. Called patient back and got her   Left number to call back.

## 2019-12-12 NOTE — TELEPHONE ENCOUNTER
CT: progression. Dr Krystin Alvarez would like to start her on Lonsurf, order sent to [facility]  Will arrange for education once patient receives drug  [facility] is asking about iron, will recheck levels.

## 2019-12-13 PROBLEM — D50.0 IRON DEFICIENCY ANEMIA DUE TO CHRONIC BLOOD LOSS: Status: ACTIVE | Noted: 2019-01-01

## 2019-12-16 NOTE — ED INITIAL ASSESSMENT (HPI)
Pt brought from cancer center after having iron therapy. Pt had a fever so was brought here for eval. Pt's last chemo was 11/7/19. Pt has had loose stools for 2 wks approx 2x/day. No N/V pain or cough.

## 2019-12-16 NOTE — PROGRESS NOTES
Temp 102. 6. Michael Rangel notified and ordered pt to ER. Pt c/o chills and difficulty staying awake in chair. No other c/o. Reported just getting over bronchitis. Report called to ER charge nurse. Sent to ER with RN via BENJIE Ashley 23.

## 2019-12-16 NOTE — ED PROVIDER NOTES
Patient Seen in: THE [facility] Emergency Department In [place]      History   Patient presents with:  Fever    Stated Complaint:     HPI    14-year-old female with history of metastatic rectal cancer sent from the cancer center for evaluation of fever.   She and negative except as noted above.     Physical Exam     ED Triage Vitals [12/16/19 1357]   /70   Pulse 108   Resp 18   Temp (!) 101.2 °F (38.4 °C)   Temp src Temporal   SpO2 97 %   O2 Device None (Room air)       Current:/70   Pulse 108   Temp MCHC 30.7 (*)     RDW 18.2 (*)     RDW-SD 50.4 (*)     Neutrophil Absolute Prelim 11.33 (*)     Neutrophil Absolute 11.33 (*)     Monocyte Absolute 1.58 (*)     All other components within normal limits   POCT FLU TEST - Normal    Narrative:      This assay (CPT=71046)  INDICATIONS:  R05 Cough  COMPARISON:  None. TECHNIQUE:  PA and lateral chest radiographs were obtained. PATIENT STATED HISTORY: (As transcribed by Technologist)  Patient states she has had a productive cough for the last 11 days.  Patient sta bases is not currently seen. Mild nodular pleural thickening along the inferior aspect of the lingula is again seen and is stable. No new pulmonary lesions. MEDIASTINUM:  No mass or adenopathy. RONALD:  No mass or adenopathy.   CARDIAC:  No enlargement, pe destructive osseous changes. CONCLUSION:  1. There is evidence of increasing metastatic disease to the liver as described above.  2. The exam is otherwise unchanged from the previous study without evidence of an acute process involving the chest, abdom

## 2019-12-17 NOTE — TELEPHONE ENCOUNTER
[facility] called and was given the ok for the blood work as noted below.    Urmila So RN, [facility]     Patient to come to [facility] tomorrow for Infed and to start Lonsurf at 11:15 am, confirmed with patient.

## 2019-12-18 NOTE — PROGRESS NOTES
ORAL CHEMOTHERAPY EDUCATION RECORD  Learner:  Patient     Barriers / Limitations: None     Diagnosis:   Metastatic CRC     Medication Name:   Lonsurf    Dose:         30 mg ( 2 tabs) Frequency:  BID days 1-5 and 8-12 every 28 days     Administration Guidel side effects and management, when to call provider and contact information.      Emmy Garcia ANP-BC  Nurse Practitioner  [facility]  [facility]

## 2019-12-18 NOTE — PROGRESS NOTES
Tolerated iron infusion without incident. Post vitals- 108/64, 85, 18, 100.0, 100%RA. Will see JULIA Magallon next week in [place].

## 2019-12-18 NOTE — PROGRESS NOTES
Patient presents with: Follow - Up: apn assessment prior to treatment    Infed/Lonsurf postponed on 12/16 due to fever and patient was seen in ER.   Patient here today temperature 100 denies chills nausea or vomiting, stools are loose, patient has some abd

## 2019-12-26 NOTE — PROGRESS NOTES
ANP Visit Note    Patient Name: Shaan Zazueta   YOB: 1945   Medical Record Number: [id number]   CSN: [id number]   Date of visit: 12/26/2019   KIM MEIER, DO   No primary care provider on file.      Chief Complaint/Reason for Visit:  P (transient ischemic attack) 08/2019   • Wears glasses        Surgical History:  Past Surgical History:   Procedure Laterality Date   • CHOLECYSTECTOMY     • COLONOSCOPY     • COLONOSCOPY     • ERCP,DIAGNOSTIC     • FLEXIBLE SIGMOIDOSCOPY N/A 12/11/2018 Relationships      Social connections:        Talks on phone: Not on file        Gets together: Not on file        Attends Religion service: Not on file        Active member of club or organization: Not on file        Attends meetings of clubs or [place] mg by mouth daily. , Disp: , Rfl:   •  ZINC OR, Use as directed 1 capsule in the mouth or throat daily. , Disp: , Rfl:   •  omega-3 fatty acids 1000 MG Oral Cap, Take 1,000 mg by mouth.  Daily or as directed , Disp: , Rfl:     Narcotic prescription reviewed Ref Range: 55 - 142 U/L 453 (H)   AST (SGOT) Latest Ref Range: 15 - 37 U/L 60 (H)   ALT (SGPT) Latest Ref Range: 13 - 56 U/L 32   Total Bilirubin Latest Ref Range: 0.1 - 2.0 mg/dL 0.5   Globulin Latest Ref Range: 2.8 - 4.4 g/dL 5.5 (H)   Iron, Serum Latest 9.3   Monocytes % Latest Units: % 8.8   Eosinophils % Latest Units: % 1.2   Basophils % Latest Units: % 0.2   Immature Granulocyte % Latest Units: % 1.0       Impression/Plan:  1. Metastatic Rectal Cancer: Lonsurf  2. Anemia: s/p IV Iron  3.  Fevers: most l

## 2019-12-26 NOTE — PROGRESS NOTES
Patient is here for APN assessment. She started Lonsurf on 12/18. She reports intermittent fevers as high as 102. Loose stools 3-4x daily and intermittent cramping. Appetite is fair.  Energy level was up after the Infed infusion on 12/18 but now is starting

## 2019-12-26 NOTE — PROGRESS NOTES
Pt here for APN visit and IVF. NS and IV dex ordered per APN. Pt refused dex due to insomnia and jitteriness. APn notified.  Arrives Ambulating independently, accompanied by Self           Patient reports possible pregnancy since last therapy cycle: No    M

## 2019-12-26 NOTE — PATIENT INSTRUCTIONS
Salty foods/meal ideas:   Yeast breads  Pizza  All single code sandwiches  Cold cuts and cured meats  Burritos and tacos  Soups  All savory snacks  Chicken, whole pieces  Cheese  Eggs and omelets  Pasta mixed dishes (except for macaroni and cheese)  Meat m

## 2020-01-01 ENCOUNTER — OFFICE VISIT (OUTPATIENT)
Dept: HEMATOLOGY/ONCOLOGY | Age: 75
End: 2020-01-01
Attending: INTERNAL MEDICINE
Payer: MEDICARE

## 2020-01-01 ENCOUNTER — MED REC SCAN ONLY (OUTPATIENT)
Dept: FAMILY MEDICINE CLINIC | Facility: CLINIC | Age: 75
End: 2020-01-01

## 2020-01-01 ENCOUNTER — HOSPITAL ENCOUNTER (INPATIENT)
Facility: HOSPITAL | Age: 75
LOS: 5 days | Discharge: HOME | DRG: 378 | End: 2020-01-01
Attending: EMERGENCY MEDICINE | Admitting: INTERNAL MEDICINE
Payer: MEDICARE

## 2020-01-01 ENCOUNTER — TELEPHONE (OUTPATIENT)
Dept: NEUROLOGY | Facility: CLINIC | Age: 75
End: 2020-01-01

## 2020-01-01 ENCOUNTER — APPOINTMENT (OUTPATIENT)
Dept: HEMATOLOGY/ONCOLOGY | Age: 75
End: 2020-01-01
Attending: INTERNAL MEDICINE
Payer: MEDICARE

## 2020-01-01 ENCOUNTER — APPOINTMENT (OUTPATIENT)
Dept: ULTRASOUND IMAGING | Facility: HOSPITAL | Age: 75
DRG: 378 | End: 2020-01-01
Attending: INTERNAL MEDICINE
Payer: MEDICARE

## 2020-01-01 ENCOUNTER — TELEPHONE (OUTPATIENT)
Dept: HEMATOLOGY/ONCOLOGY | Facility: HOSPITAL | Age: 75
End: 2020-01-01

## 2020-01-01 ENCOUNTER — NURSE ONLY (OUTPATIENT)
Dept: HEMATOLOGY/ONCOLOGY | Facility: HOSPITAL | Age: 75
End: 2020-01-01

## 2020-01-01 ENCOUNTER — DIETICIAN VISIT (OUTPATIENT)
Dept: HEMATOLOGY/ONCOLOGY | Facility: HOSPITAL | Age: 75
End: 2020-01-01

## 2020-01-01 ENCOUNTER — ANESTHESIA EVENT (OUTPATIENT)
Dept: ENDOSCOPY | Facility: HOSPITAL | Age: 75
DRG: 378 | End: 2020-01-01
Payer: MEDICARE

## 2020-01-01 ENCOUNTER — OFFICE VISIT (OUTPATIENT)
Dept: HEMATOLOGY/ONCOLOGY | Facility: HOSPITAL | Age: 75
End: 2020-01-01
Attending: INTERNAL MEDICINE
Payer: MEDICARE

## 2020-01-01 ENCOUNTER — APPOINTMENT (OUTPATIENT)
Dept: ULTRASOUND IMAGING | Facility: HOSPITAL | Age: 75
DRG: 378 | End: 2020-01-01
Attending: HOSPITALIST
Payer: MEDICARE

## 2020-01-01 ENCOUNTER — NURSE ONLY (OUTPATIENT)
Dept: HEMATOLOGY/ONCOLOGY | Age: 75
End: 2020-01-01
Attending: INTERNAL MEDICINE
Payer: MEDICARE

## 2020-01-01 ENCOUNTER — TELEPHONE (OUTPATIENT)
Dept: FAMILY MEDICINE CLINIC | Facility: CLINIC | Age: 75
End: 2020-01-01

## 2020-01-01 ENCOUNTER — APPOINTMENT (OUTPATIENT)
Dept: LAB | Facility: HOSPITAL | Age: 75
End: 2020-01-01
Attending: CLINICAL NURSE SPECIALIST
Payer: MEDICARE

## 2020-01-01 ENCOUNTER — HOSPITAL ENCOUNTER (OUTPATIENT)
Dept: ULTRASOUND IMAGING | Facility: HOSPITAL | Age: 75
Discharge: HOME | DRG: 378 | End: 2020-01-01
Attending: CLINICAL NURSE SPECIALIST
Payer: MEDICARE

## 2020-01-01 ENCOUNTER — OFFICE VISIT (OUTPATIENT)
Dept: NEUROLOGY | Facility: CLINIC | Age: 75
End: 2020-01-01
Payer: MEDICARE

## 2020-01-01 ENCOUNTER — ANESTHESIA (OUTPATIENT)
Dept: ENDOSCOPY | Facility: HOSPITAL | Age: 75
DRG: 378 | End: 2020-01-01
Payer: MEDICARE

## 2020-01-01 ENCOUNTER — HOSPITAL ENCOUNTER (OUTPATIENT)
Dept: ULTRASOUND IMAGING | Facility: HOSPITAL | Age: 75
Discharge: HOME | End: 2020-01-01
Attending: CLINICAL NURSE SPECIALIST
Payer: MEDICARE

## 2020-01-01 VITALS
RESPIRATION RATE: 18 BRPM | DIASTOLIC BLOOD PRESSURE: 82 MMHG | BODY MASS INDEX: 20 KG/M2 | TEMPERATURE: 97 F | SYSTOLIC BLOOD PRESSURE: 123 MMHG | WEIGHT: 114.5 LBS | HEART RATE: 98 BPM | OXYGEN SATURATION: 99 %

## 2020-01-01 VITALS
WEIGHT: 110.5 LBS | TEMPERATURE: 99 F | SYSTOLIC BLOOD PRESSURE: 100 MMHG | DIASTOLIC BLOOD PRESSURE: 65 MMHG | HEART RATE: 100 BPM | RESPIRATION RATE: 16 BRPM | OXYGEN SATURATION: 99 % | BODY MASS INDEX: 20 KG/M2

## 2020-01-01 VITALS
TEMPERATURE: 98 F | WEIGHT: 114.81 LBS | BODY MASS INDEX: 20.34 KG/M2 | DIASTOLIC BLOOD PRESSURE: 80 MMHG | OXYGEN SATURATION: 99 % | HEIGHT: 62.99 IN | SYSTOLIC BLOOD PRESSURE: 119 MMHG | HEART RATE: 106 BPM | RESPIRATION RATE: 18 BRPM

## 2020-01-01 VITALS
BODY MASS INDEX: 21 KG/M2 | WEIGHT: 119.94 LBS | SYSTOLIC BLOOD PRESSURE: 92 MMHG | OXYGEN SATURATION: 100 % | DIASTOLIC BLOOD PRESSURE: 60 MMHG | RESPIRATION RATE: 16 BRPM | TEMPERATURE: 98 F | HEART RATE: 96 BPM

## 2020-01-01 VITALS
RESPIRATION RATE: 16 BRPM | DIASTOLIC BLOOD PRESSURE: 70 MMHG | SYSTOLIC BLOOD PRESSURE: 130 MMHG | WEIGHT: 115 LBS | HEART RATE: 84 BPM | BODY MASS INDEX: 20 KG/M2

## 2020-01-01 VITALS
HEART RATE: 104 BPM | OXYGEN SATURATION: 96 % | SYSTOLIC BLOOD PRESSURE: 100 MMHG | TEMPERATURE: 99 F | RESPIRATION RATE: 18 BRPM | DIASTOLIC BLOOD PRESSURE: 64 MMHG | WEIGHT: 120 LBS | BODY MASS INDEX: 21.26 KG/M2 | HEIGHT: 62.99 IN

## 2020-01-01 VITALS
BODY MASS INDEX: 20 KG/M2 | OXYGEN SATURATION: 100 % | SYSTOLIC BLOOD PRESSURE: 127 MMHG | RESPIRATION RATE: 16 BRPM | DIASTOLIC BLOOD PRESSURE: 76 MMHG | TEMPERATURE: 98 F | HEART RATE: 97 BPM | WEIGHT: 115.5 LBS

## 2020-01-01 VITALS — WEIGHT: 114 LBS | HEIGHT: 64 IN | BODY MASS INDEX: 19.46 KG/M2

## 2020-01-01 DIAGNOSIS — R60.0 BILATERAL LOWER EXTREMITY EDEMA: ICD-10-CM

## 2020-01-01 DIAGNOSIS — D64.9 ANEMIA, NORMOCYTIC NORMOCHROMIC: ICD-10-CM

## 2020-01-01 DIAGNOSIS — C20 RECTAL CANCER (HCC): ICD-10-CM

## 2020-01-01 DIAGNOSIS — C18.9 METASTATIC COLON CANCER TO LIVER (HCC): ICD-10-CM

## 2020-01-01 DIAGNOSIS — C78.7 METASTATIC COLON CANCER TO LIVER (HCC): Primary | ICD-10-CM

## 2020-01-01 DIAGNOSIS — C78.7 LIVER METASTASES (HCC): ICD-10-CM

## 2020-01-01 DIAGNOSIS — C78.7 SECONDARY CARCINOMA OF LIVER (HCC): ICD-10-CM

## 2020-01-01 DIAGNOSIS — C78.7 METASTATIC COLON CANCER TO LIVER (HCC): ICD-10-CM

## 2020-01-01 DIAGNOSIS — D50.9 IRON DEFICIENCY ANEMIA, UNSPECIFIED IRON DEFICIENCY ANEMIA TYPE: ICD-10-CM

## 2020-01-01 DIAGNOSIS — R18.0 MALIGNANT ASCITES: ICD-10-CM

## 2020-01-01 DIAGNOSIS — R63.0 DECREASED APPETITE: ICD-10-CM

## 2020-01-01 DIAGNOSIS — C20 RECTAL MALIGNANT NEOPLASM (HCC): ICD-10-CM

## 2020-01-01 DIAGNOSIS — C18.9 METASTATIC COLON CANCER TO LIVER (HCC): Primary | ICD-10-CM

## 2020-01-01 DIAGNOSIS — Z71.89 GOALS OF CARE, COUNSELING/DISCUSSION: ICD-10-CM

## 2020-01-01 DIAGNOSIS — R18.8 ASCITES OF LIVER: Primary | ICD-10-CM

## 2020-01-01 DIAGNOSIS — R22.43 LOCALIZED SWELLING OF BOTH LOWER LEGS: ICD-10-CM

## 2020-01-01 DIAGNOSIS — K92.2 UPPER GI BLEED: Primary | ICD-10-CM

## 2020-01-01 DIAGNOSIS — D69.6 THROMBOCYTOPENIA (HCC): ICD-10-CM

## 2020-01-01 DIAGNOSIS — D50.0 IRON DEFICIENCY ANEMIA DUE TO CHRONIC BLOOD LOSS: ICD-10-CM

## 2020-01-01 DIAGNOSIS — C20 RECTAL CANCER (HCC): Primary | ICD-10-CM

## 2020-01-01 DIAGNOSIS — K52.1 DIARRHEA DUE TO DRUG: ICD-10-CM

## 2020-01-01 DIAGNOSIS — Z51.5 PALLIATIVE CARE ENCOUNTER: ICD-10-CM

## 2020-01-01 DIAGNOSIS — E87.1 HYPONATREMIA: ICD-10-CM

## 2020-01-01 DIAGNOSIS — K92.0 COFFEE GROUND EMESIS: ICD-10-CM

## 2020-01-01 DIAGNOSIS — R22.43 LOCALIZED SWELLING OF BOTH LOWER LEGS: Primary | ICD-10-CM

## 2020-01-01 DIAGNOSIS — K92.0 HEMATEMESIS, PRESENCE OF NAUSEA NOT SPECIFIED: ICD-10-CM

## 2020-01-01 DIAGNOSIS — D63.8 ANEMIA OF CHRONIC DISEASE: ICD-10-CM

## 2020-01-01 DIAGNOSIS — Z51.11 ENCOUNTER FOR CHEMOTHERAPY MANAGEMENT: ICD-10-CM

## 2020-01-01 DIAGNOSIS — R53.0 NEOPLASTIC MALIGNANT RELATED FATIGUE: ICD-10-CM

## 2020-01-01 DIAGNOSIS — D64.9 ANEMIA, NORMOCYTIC NORMOCHROMIC: Primary | ICD-10-CM

## 2020-01-01 DIAGNOSIS — K92.0 COFFEE GROUND EMESIS: Primary | ICD-10-CM

## 2020-01-01 DIAGNOSIS — I63.9 ACUTE CVA (CEREBROVASCULAR ACCIDENT) (HCC): Primary | ICD-10-CM

## 2020-01-01 DIAGNOSIS — R79.89 ELEVATED LFTS: ICD-10-CM

## 2020-01-01 DIAGNOSIS — D64.9 ANEMIA, UNSPECIFIED TYPE: ICD-10-CM

## 2020-01-01 DIAGNOSIS — R18.8 ASCITES OF LIVER: ICD-10-CM

## 2020-01-01 LAB
ALBUMIN SERPL-MCNC: 1.4 G/DL (ref 3.4–5)
ALBUMIN SERPL-MCNC: 1.5 G/DL (ref 3.4–5)
ALBUMIN SERPL-MCNC: 1.6 G/DL (ref 3.4–5)
ALBUMIN SERPL-MCNC: 1.8 G/DL (ref 3.4–5)
ALBUMIN SERPL-MCNC: 1.8 G/DL (ref 3.4–5)
ALBUMIN/GLOB SERPL: 0.3 {RATIO} (ref 1–2)
ALP LIVER SERPL-CCNC: 1088 U/L (ref 55–142)
ALP LIVER SERPL-CCNC: 1468 U/L (ref 55–142)
ALP LIVER SERPL-CCNC: 521 U/L (ref 55–142)
ALP LIVER SERPL-CCNC: 616 U/L (ref 55–142)
ALP LIVER SERPL-CCNC: 950 U/L (ref 55–142)
ALT SERPL-CCNC: 138 U/L (ref 13–56)
ALT SERPL-CCNC: 36 U/L (ref 13–56)
ALT SERPL-CCNC: 36 U/L (ref 13–56)
ALT SERPL-CCNC: 39 U/L (ref 13–56)
ALT SERPL-CCNC: 62 U/L (ref 13–56)
ANION GAP SERPL CALC-SCNC: 10 MMOL/L (ref 0–18)
ANION GAP SERPL CALC-SCNC: 7 MMOL/L (ref 0–18)
ANION GAP SERPL CALC-SCNC: 8 MMOL/L (ref 0–18)
ANION GAP SERPL CALC-SCNC: 8 MMOL/L (ref 0–18)
ANION GAP SERPL CALC-SCNC: 9 MMOL/L (ref 0–18)
ANTIBODY SCREEN: NEGATIVE
APTT PPP: 34.4 SECONDS (ref 25.4–36.1)
AST SERPL-CCNC: 102 U/L (ref 15–37)
AST SERPL-CCNC: 296 U/L (ref 15–37)
AST SERPL-CCNC: 58 U/L (ref 15–37)
AST SERPL-CCNC: 81 U/L (ref 15–37)
AST SERPL-CCNC: 864 U/L (ref 15–37)
ATRIAL RATE: 109 BPM
BASOPHILS # BLD AUTO: 0.02 X10(3) UL (ref 0–0.2)
BASOPHILS # BLD AUTO: 0.03 X10(3) UL (ref 0–0.2)
BASOPHILS # BLD AUTO: 0.03 X10(3) UL (ref 0–0.2)
BASOPHILS NFR BLD AUTO: 0.1 %
BASOPHILS NFR BLD AUTO: 0.2 %
BILIRUB SERPL-MCNC: 0.6 MG/DL (ref 0.1–2)
BILIRUB SERPL-MCNC: 0.7 MG/DL (ref 0.1–2)
BILIRUB SERPL-MCNC: 0.7 MG/DL (ref 0.1–2)
BILIRUB SERPL-MCNC: 1 MG/DL (ref 0.1–2)
BILIRUB SERPL-MCNC: 1.4 MG/DL (ref 0.1–2)
BLOOD TYPE BARCODE: 5100
BLOOD TYPE BARCODE: 5100
BUN BLD-MCNC: 11 MG/DL (ref 7–18)
BUN BLD-MCNC: 12 MG/DL (ref 7–18)
BUN BLD-MCNC: 13 MG/DL (ref 7–18)
BUN BLD-MCNC: 46 MG/DL (ref 7–18)
BUN BLD-MCNC: 52 MG/DL (ref 7–18)
BUN/CREAT SERPL: 22.6 (ref 10–20)
BUN/CREAT SERPL: 22.9 (ref 10–20)
BUN/CREAT SERPL: 25.5 (ref 10–20)
BUN/CREAT SERPL: 71.9 (ref 10–20)
BUN/CREAT SERPL: 83.9 (ref 10–20)
CALCIUM BLD-MCNC: 8.3 MG/DL (ref 8.5–10.1)
CALCIUM BLD-MCNC: 8.7 MG/DL (ref 8.5–10.1)
CALCIUM BLD-MCNC: 8.8 MG/DL (ref 8.5–10.1)
CALCIUM BLD-MCNC: 8.9 MG/DL (ref 8.5–10.1)
CALCIUM BLD-MCNC: 9.1 MG/DL (ref 8.5–10.1)
CEA SERPL-MCNC: 1137.2 NG/ML (ref ?–5)
CEA SERPL-MCNC: 1972.4 NG/ML (ref ?–5)
CEA SERPL-MCNC: 358.5 NG/ML (ref ?–5)
CHLORIDE SERPL-SCNC: 100 MMOL/L (ref 98–112)
CHLORIDE SERPL-SCNC: 100 MMOL/L (ref 98–112)
CHLORIDE SERPL-SCNC: 103 MMOL/L (ref 98–112)
CHLORIDE SERPL-SCNC: 106 MMOL/L (ref 98–112)
CHLORIDE SERPL-SCNC: 111 MMOL/L (ref 98–112)
CO2 SERPL-SCNC: 22 MMOL/L (ref 21–32)
CO2 SERPL-SCNC: 23 MMOL/L (ref 21–32)
CO2 SERPL-SCNC: 24 MMOL/L (ref 21–32)
CREAT BLD-MCNC: 0.48 MG/DL (ref 0.55–1.02)
CREAT BLD-MCNC: 0.51 MG/DL (ref 0.55–1.02)
CREAT BLD-MCNC: 0.53 MG/DL (ref 0.55–1.02)
CREAT BLD-MCNC: 0.62 MG/DL (ref 0.55–1.02)
CREAT BLD-MCNC: 0.64 MG/DL (ref 0.55–1.02)
DEPRECATED HBV CORE AB SER IA-ACNC: 587.6 NG/ML (ref 18–340)
DEPRECATED HBV CORE AB SER IA-ACNC: 782.5 NG/ML (ref 18–340)
DEPRECATED RDW RBC AUTO: 56.4 FL (ref 35.1–46.3)
DEPRECATED RDW RBC AUTO: 64.1 FL (ref 35.1–46.3)
DEPRECATED RDW RBC AUTO: 67.4 FL (ref 35.1–46.3)
DEPRECATED RDW RBC AUTO: 67.7 FL (ref 35.1–46.3)
DEPRECATED RDW RBC AUTO: 70.6 FL (ref 35.1–46.3)
EOSINOPHIL # BLD AUTO: 0.01 X10(3) UL (ref 0–0.7)
EOSINOPHIL # BLD AUTO: 0.01 X10(3) UL (ref 0–0.7)
EOSINOPHIL # BLD AUTO: 0.02 X10(3) UL (ref 0–0.7)
EOSINOPHIL # BLD AUTO: 0.06 X10(3) UL (ref 0–0.7)
EOSINOPHIL # BLD AUTO: 0.07 X10(3) UL (ref 0–0.7)
EOSINOPHIL NFR BLD AUTO: 0.1 %
EOSINOPHIL NFR BLD AUTO: 0.1 %
EOSINOPHIL NFR BLD AUTO: 0.2 %
EOSINOPHIL NFR BLD AUTO: 0.5 %
EOSINOPHIL NFR BLD AUTO: 0.7 %
ERYTHROCYTE [DISTWIDTH] IN BLOOD BY AUTOMATED COUNT: 19.8 % (ref 11–15)
ERYTHROCYTE [DISTWIDTH] IN BLOOD BY AUTOMATED COUNT: 21.7 % (ref 11–15)
ERYTHROCYTE [DISTWIDTH] IN BLOOD BY AUTOMATED COUNT: 21.8 % (ref 11–15)
ERYTHROCYTE [DISTWIDTH] IN BLOOD BY AUTOMATED COUNT: 23.5 % (ref 11–15)
ERYTHROCYTE [DISTWIDTH] IN BLOOD BY AUTOMATED COUNT: 24.4 % (ref 11–15)
GLOBULIN PLAS-MCNC: 5.4 G/DL (ref 2.8–4.4)
GLOBULIN PLAS-MCNC: 5.4 G/DL (ref 2.8–4.4)
GLOBULIN PLAS-MCNC: 5.8 G/DL (ref 2.8–4.4)
GLOBULIN PLAS-MCNC: 5.8 G/DL (ref 2.8–4.4)
GLOBULIN PLAS-MCNC: 5.9 G/DL (ref 2.8–4.4)
GLUCOSE BLD-MCNC: 101 MG/DL (ref 70–99)
GLUCOSE BLD-MCNC: 119 MG/DL (ref 70–99)
GLUCOSE BLD-MCNC: 120 MG/DL (ref 70–99)
GLUCOSE BLD-MCNC: 120 MG/DL (ref 70–99)
GLUCOSE BLD-MCNC: 136 MG/DL (ref 70–99)
HAV IGM SER QL: 2 MG/DL (ref 1.6–2.6)
HCT VFR BLD AUTO: 24.8 % (ref 35–48)
HCT VFR BLD AUTO: 26.1 % (ref 35–48)
HCT VFR BLD AUTO: 27.4 % (ref 35–48)
HCT VFR BLD AUTO: 29 % (ref 35–48)
HCT VFR BLD AUTO: 31 % (ref 35–48)
HGB BLD-MCNC: 7.4 G/DL (ref 12–16)
HGB BLD-MCNC: 7.9 G/DL (ref 12–16)
HGB BLD-MCNC: 7.9 G/DL (ref 12–16)
HGB BLD-MCNC: 8 G/DL (ref 12–16)
HGB BLD-MCNC: 8.3 G/DL (ref 12–16)
HGB BLD-MCNC: 8.5 G/DL (ref 12–16)
HGB BLD-MCNC: 8.6 G/DL (ref 12–16)
HGB BLD-MCNC: 9.4 G/DL (ref 12–16)
HGB BLD-MCNC: 9.6 G/DL (ref 12–16)
IMM GRANULOCYTES # BLD AUTO: 0.05 X10(3) UL (ref 0–1)
IMM GRANULOCYTES # BLD AUTO: 0.08 X10(3) UL (ref 0–1)
IMM GRANULOCYTES # BLD AUTO: 0.08 X10(3) UL (ref 0–1)
IMM GRANULOCYTES # BLD AUTO: 0.09 X10(3) UL (ref 0–1)
IMM GRANULOCYTES # BLD AUTO: 0.1 X10(3) UL (ref 0–1)
IMM GRANULOCYTES NFR BLD: 0.5 %
IMM GRANULOCYTES NFR BLD: 0.6 %
IMM GRANULOCYTES NFR BLD: 0.6 %
IMM GRANULOCYTES NFR BLD: 0.7 %
IMM GRANULOCYTES NFR BLD: 0.7 %
INR BLD: 1.07 (ref 0.9–1.1)
INR BLD: 1.21 (ref 0.9–1.1)
INR BLD: 1.28 (ref 0.9–1.1)
INR BLD: 1.45 (ref 0.9–1.1)
IRON SATURATION: 12 % (ref 15–50)
IRON SATURATION: 15 % (ref 15–50)
IRON SERPL-MCNC: 18 UG/DL (ref 50–170)
IRON SERPL-MCNC: 28 UG/DL (ref 50–170)
LIPASE SERPL-CCNC: 112 U/L (ref 73–393)
LYMPHOCYTES # BLD AUTO: 0.76 X10(3) UL (ref 1–4)
LYMPHOCYTES # BLD AUTO: 0.82 X10(3) UL (ref 1–4)
LYMPHOCYTES # BLD AUTO: 0.82 X10(3) UL (ref 1–4)
LYMPHOCYTES # BLD AUTO: 0.88 X10(3) UL (ref 1–4)
LYMPHOCYTES # BLD AUTO: 1.42 X10(3) UL (ref 1–4)
LYMPHOCYTES NFR BLD AUTO: 10.3 %
LYMPHOCYTES NFR BLD AUTO: 5.4 %
LYMPHOCYTES NFR BLD AUTO: 6 %
LYMPHOCYTES NFR BLD AUTO: 7.1 %
LYMPHOCYTES NFR BLD AUTO: 8.5 %
M PROTEIN MFR SERPL ELPH: 6.8 G/DL (ref 6.4–8.2)
M PROTEIN MFR SERPL ELPH: 6.9 G/DL (ref 6.4–8.2)
M PROTEIN MFR SERPL ELPH: 7.4 G/DL (ref 6.4–8.2)
M PROTEIN MFR SERPL ELPH: 7.6 G/DL (ref 6.4–8.2)
M PROTEIN MFR SERPL ELPH: 7.7 G/DL (ref 6.4–8.2)
MCH RBC QN AUTO: 24.5 PG (ref 26–34)
MCH RBC QN AUTO: 24.8 PG (ref 26–34)
MCH RBC QN AUTO: 25.8 PG (ref 26–34)
MCH RBC QN AUTO: 26.3 PG (ref 26–34)
MCH RBC QN AUTO: 26.8 PG (ref 26–34)
MCHC RBC AUTO-ENTMCNC: 29.8 G/DL (ref 31–37)
MCHC RBC AUTO-ENTMCNC: 30.3 G/DL (ref 31–37)
MCHC RBC AUTO-ENTMCNC: 30.3 G/DL (ref 31–37)
MCHC RBC AUTO-ENTMCNC: 31 G/DL (ref 31–37)
MCHC RBC AUTO-ENTMCNC: 32.4 G/DL (ref 31–37)
MCV RBC AUTO: 81.1 FL (ref 80–100)
MCV RBC AUTO: 81.8 FL (ref 80–100)
MCV RBC AUTO: 82.6 FL (ref 80–100)
MCV RBC AUTO: 84.9 FL (ref 80–100)
MCV RBC AUTO: 86.4 FL (ref 80–100)
MONOCYTES # BLD AUTO: 0.93 X10(3) UL (ref 0.1–1)
MONOCYTES # BLD AUTO: 1.05 X10(3) UL (ref 0.1–1)
MONOCYTES # BLD AUTO: 1.15 X10(3) UL (ref 0.1–1)
MONOCYTES # BLD AUTO: 1.22 X10(3) UL (ref 0.1–1)
MONOCYTES # BLD AUTO: 1.44 X10(3) UL (ref 0.1–1)
MONOCYTES NFR BLD AUTO: 10.6 %
MONOCYTES NFR BLD AUTO: 8.3 %
MONOCYTES NFR BLD AUTO: 8.3 %
MONOCYTES NFR BLD AUTO: 8.8 %
MONOCYTES NFR BLD AUTO: 9.6 %
NEUTROPHILS # BLD AUTO: 10.65 X10 (3) UL (ref 1.5–7.7)
NEUTROPHILS # BLD AUTO: 10.65 X10(3) UL (ref 1.5–7.7)
NEUTROPHILS # BLD AUTO: 11.16 X10 (3) UL (ref 1.5–7.7)
NEUTROPHILS # BLD AUTO: 11.16 X10(3) UL (ref 1.5–7.7)
NEUTROPHILS # BLD AUTO: 13.97 X10 (3) UL (ref 1.5–7.7)
NEUTROPHILS # BLD AUTO: 13.97 X10(3) UL (ref 1.5–7.7)
NEUTROPHILS # BLD AUTO: 7.75 X10 (3) UL (ref 1.5–7.7)
NEUTROPHILS # BLD AUTO: 7.75 X10(3) UL (ref 1.5–7.7)
NEUTROPHILS # BLD AUTO: 9.27 X10 (3) UL (ref 1.5–7.7)
NEUTROPHILS # BLD AUTO: 9.27 X10(3) UL (ref 1.5–7.7)
NEUTROPHILS NFR BLD AUTO: 80.5 %
NEUTROPHILS NFR BLD AUTO: 80.6 %
NEUTROPHILS NFR BLD AUTO: 80.9 %
NEUTROPHILS NFR BLD AUTO: 84.6 %
NEUTROPHILS NFR BLD AUTO: 84.9 %
OSMOLALITY SERPL CALC.SUM OF ELEC: 274 MOSM/KG (ref 275–295)
OSMOLALITY SERPL CALC.SUM OF ELEC: 277 MOSM/KG (ref 275–295)
OSMOLALITY SERPL CALC.SUM OF ELEC: 286 MOSM/KG (ref 275–295)
OSMOLALITY SERPL CALC.SUM OF ELEC: 299 MOSM/KG (ref 275–295)
OSMOLALITY SERPL CALC.SUM OF ELEC: 303 MOSM/KG (ref 275–295)
P AXIS: 44 DEGREES
P-R INTERVAL: 152 MS
PATIENT FASTING Y/N/NP: NO
PLATELET # BLD AUTO: 187 10(3)UL (ref 150–450)
PLATELET # BLD AUTO: 214 10(3)UL (ref 150–450)
PLATELET # BLD AUTO: 279 10(3)UL (ref 150–450)
PLATELET # BLD AUTO: 349 10(3)UL (ref 150–450)
PLATELET # BLD AUTO: 376 10(3)UL (ref 150–450)
PLATELET MORPHOLOGY: NORMAL
POTASSIUM SERPL-SCNC: 3.8 MMOL/L (ref 3.5–5.1)
POTASSIUM SERPL-SCNC: 4 MMOL/L (ref 3.5–5.1)
POTASSIUM SERPL-SCNC: 4.1 MMOL/L (ref 3.5–5.1)
POTASSIUM SERPL-SCNC: 4.7 MMOL/L (ref 3.5–5.1)
POTASSIUM SERPL-SCNC: 5.4 MMOL/L (ref 3.5–5.1)
PSA SERPL DL<=0.01 NG/ML-MCNC: 14 SECONDS (ref 12.2–14.4)
PSA SERPL DL<=0.01 NG/ML-MCNC: 15.9 SECONDS (ref 12.5–14.7)
PSA SERPL DL<=0.01 NG/ML-MCNC: 16.6 SECONDS (ref 12.5–14.7)
PSA SERPL DL<=0.01 NG/ML-MCNC: 18 SECONDS (ref 12.2–14.4)
Q-T INTERVAL: 342 MS
QRS DURATION: 98 MS
QTC CALCULATION (BEZET): 460 MS
R AXIS: -3 DEGREES
RBC # BLD AUTO: 2.87 X10(6)UL (ref 3.8–5.3)
RBC # BLD AUTO: 3.22 X10(6)UL (ref 3.8–5.3)
RBC # BLD AUTO: 3.35 X10(6)UL (ref 3.8–5.3)
RBC # BLD AUTO: 3.51 X10(6)UL (ref 3.8–5.3)
RBC # BLD AUTO: 3.65 X10(6)UL (ref 3.8–5.3)
RH BLOOD TYPE: POSITIVE
SODIUM SERPL-SCNC: 132 MMOL/L (ref 136–145)
SODIUM SERPL-SCNC: 133 MMOL/L (ref 136–145)
SODIUM SERPL-SCNC: 137 MMOL/L (ref 136–145)
SODIUM SERPL-SCNC: 137 MMOL/L (ref 136–145)
SODIUM SERPL-SCNC: 140 MMOL/L (ref 136–145)
T AXIS: 23 DEGREES
TOTAL IRON BINDING CAPACITY: 153 UG/DL (ref 240–450)
TOTAL IRON BINDING CAPACITY: 189 UG/DL (ref 240–450)
TRANSFERRIN SERPL-MCNC: 103 MG/DL (ref 200–360)
TRANSFERRIN SERPL-MCNC: 127 MG/DL (ref 200–360)
VENTRICULAR RATE: 109 BPM
VIT B12 SERPL-MCNC: >2000 PG/ML (ref 193–986)
WBC # BLD AUTO: 11.5 X10(3) UL (ref 4–11)
WBC # BLD AUTO: 12.6 X10(3) UL (ref 4–11)
WBC # BLD AUTO: 13.8 X10(3) UL (ref 4–11)
WBC # BLD AUTO: 16.4 X10(3) UL (ref 4–11)
WBC # BLD AUTO: 9.6 X10(3) UL (ref 4–11)

## 2020-01-01 PROCEDURE — 99223 1ST HOSP IP/OBS HIGH 75: CPT | Performed by: INTERNAL MEDICINE

## 2020-01-01 PROCEDURE — 99215 OFFICE O/P EST HI 40 MIN: CPT | Performed by: CLINICAL NURSE SPECIALIST

## 2020-01-01 PROCEDURE — 82378 CARCINOEMBRYONIC ANTIGEN: CPT

## 2020-01-01 PROCEDURE — 85025 COMPLETE CBC W/AUTO DIFF WBC: CPT

## 2020-01-01 PROCEDURE — 93975 VASCULAR STUDY: CPT | Performed by: INTERNAL MEDICINE

## 2020-01-01 PROCEDURE — 49083 ABD PARACENTESIS W/IMAGING: CPT | Performed by: INTERNAL MEDICINE

## 2020-01-01 PROCEDURE — 82728 ASSAY OF FERRITIN: CPT

## 2020-01-01 PROCEDURE — 36591 DRAW BLOOD OFF VENOUS DEVICE: CPT

## 2020-01-01 PROCEDURE — 85610 PROTHROMBIN TIME: CPT

## 2020-01-01 PROCEDURE — 0W9G3ZZ DRAINAGE OF PERITONEAL CAVITY, PERCUTANEOUS APPROACH: ICD-10-PCS | Performed by: RADIOLOGY

## 2020-01-01 PROCEDURE — 99232 SBSQ HOSP IP/OBS MODERATE 35: CPT | Performed by: HOSPITALIST

## 2020-01-01 PROCEDURE — 76700 US EXAM ABDOM COMPLETE: CPT | Performed by: INTERNAL MEDICINE

## 2020-01-01 PROCEDURE — 83550 IRON BINDING TEST: CPT

## 2020-01-01 PROCEDURE — BW40ZZZ ULTRASONOGRAPHY OF ABDOMEN: ICD-10-PCS | Performed by: RADIOLOGY

## 2020-01-01 PROCEDURE — 06L38CZ OCCLUSION OF ESOPHAGEAL VEIN WITH EXTRALUMINAL DEVICE, VIA NATURAL OR ARTIFICIAL OPENING ENDOSCOPIC: ICD-10-PCS | Performed by: INTERNAL MEDICINE

## 2020-01-01 PROCEDURE — 93970 EXTREMITY STUDY: CPT | Performed by: CLINICAL NURSE SPECIALIST

## 2020-01-01 PROCEDURE — 96374 THER/PROPH/DIAG INJ IV PUSH: CPT

## 2020-01-01 PROCEDURE — 99233 SBSQ HOSP IP/OBS HIGH 50: CPT | Performed by: HOSPITALIST

## 2020-01-01 PROCEDURE — 99223 1ST HOSP IP/OBS HIGH 75: CPT | Performed by: CLINICAL NURSE SPECIALIST

## 2020-01-01 PROCEDURE — 99213 OFFICE O/P EST LOW 20 MIN: CPT | Performed by: OTHER

## 2020-01-01 PROCEDURE — 96368 THER/DIAG CONCURRENT INF: CPT

## 2020-01-01 PROCEDURE — 99239 HOSP IP/OBS DSCHRG MGMT >30: CPT | Performed by: HOSPITALIST

## 2020-01-01 PROCEDURE — 80053 COMPREHEN METABOLIC PANEL: CPT

## 2020-01-01 PROCEDURE — 76705 ECHO EXAM OF ABDOMEN: CPT | Performed by: HOSPITALIST

## 2020-01-01 PROCEDURE — 0DC68ZZ EXTIRPATION OF MATTER FROM STOMACH, VIA NATURAL OR ARTIFICIAL OPENING ENDOSCOPIC: ICD-10-PCS | Performed by: INTERNAL MEDICINE

## 2020-01-01 PROCEDURE — 96413 CHEMO IV INFUSION 1 HR: CPT

## 2020-01-01 PROCEDURE — 30233N1 TRANSFUSION OF NONAUTOLOGOUS RED BLOOD CELLS INTO PERIPHERAL VEIN, PERCUTANEOUS APPROACH: ICD-10-PCS | Performed by: HOSPITALIST

## 2020-01-01 PROCEDURE — 99214 OFFICE O/P EST MOD 30 MIN: CPT | Performed by: CLINICAL NURSE SPECIALIST

## 2020-01-01 PROCEDURE — 99232 SBSQ HOSP IP/OBS MODERATE 35: CPT | Performed by: INTERNAL MEDICINE

## 2020-01-01 PROCEDURE — 76705 ECHO EXAM OF ABDOMEN: CPT | Performed by: CLINICAL NURSE SPECIALIST

## 2020-01-01 PROCEDURE — 99214 OFFICE O/P EST MOD 30 MIN: CPT | Performed by: INTERNAL MEDICINE

## 2020-01-01 PROCEDURE — 83540 ASSAY OF IRON: CPT

## 2020-01-01 PROCEDURE — 82607 VITAMIN B-12: CPT

## 2020-01-01 PROCEDURE — 96415 CHEMO IV INFUSION ADDL HR: CPT

## 2020-01-01 PROCEDURE — 96523 IRRIG DRUG DELIVERY DEVICE: CPT

## 2020-01-01 PROCEDURE — 85730 THROMBOPLASTIN TIME PARTIAL: CPT

## 2020-01-01 RX ORDER — PANTOPRAZOLE SODIUM 40 MG/1
40 TABLET, DELAYED RELEASE ORAL
Qty: 180 TABLET | Refills: 0 | Status: SHIPPED | OUTPATIENT
Start: 2020-01-01 | End: 2020-01-01

## 2020-01-01 RX ORDER — POTASSIUM CHLORIDE 750 MG/1
10 TABLET, EXTENDED RELEASE ORAL DAILY
Qty: 30 TABLET | Refills: 0 | Status: ON HOLD | OUTPATIENT
Start: 2020-01-01 | End: 2020-01-01

## 2020-01-01 RX ORDER — TORSEMIDE 20 MG/1
20 TABLET ORAL DAILY
Qty: 30 TABLET | Refills: 0 | Status: SHIPPED | OUTPATIENT
Start: 2020-01-01

## 2020-01-01 RX ORDER — HYDROMORPHONE HYDROCHLORIDE 1 MG/ML
INJECTION, SOLUTION INTRAMUSCULAR; INTRAVENOUS; SUBCUTANEOUS
Status: COMPLETED
Start: 2020-01-01 | End: 2020-01-01

## 2020-01-01 RX ORDER — FLUOROURACIL 50 MG/ML
1800 INJECTION, SOLUTION INTRAVENOUS CONTINUOUS
Status: DISCONTINUED | OUTPATIENT
Start: 2020-01-01 | End: 2020-01-01

## 2020-01-01 RX ORDER — ACETAMINOPHEN 325 MG/1
650 TABLET ORAL EVERY 6 HOURS PRN
Status: DISCONTINUED | OUTPATIENT
Start: 2020-01-01 | End: 2020-01-01

## 2020-01-01 RX ORDER — SODIUM CHLORIDE 0.9 % (FLUSH) 0.9 %
10 SYRINGE (ML) INJECTION ONCE
Status: CANCELLED | OUTPATIENT
Start: 2020-01-01

## 2020-01-01 RX ORDER — TRAMADOL HYDROCHLORIDE 50 MG/1
TABLET ORAL EVERY 6 HOURS PRN
Qty: 60 TABLET | Refills: 0 | Status: SHIPPED | OUTPATIENT
Start: 2020-01-01

## 2020-01-01 RX ORDER — FLUOROURACIL 50 MG/ML
1800 INJECTION, SOLUTION INTRAVENOUS CONTINUOUS
Status: CANCELLED | OUTPATIENT
Start: 2020-01-01

## 2020-01-01 RX ORDER — SODIUM CHLORIDE 9 MG/ML
INJECTION, SOLUTION INTRAVENOUS ONCE
Status: COMPLETED | OUTPATIENT
Start: 2020-01-01 | End: 2020-01-01

## 2020-01-01 RX ORDER — SALIVA STIMULANT COMB. NO.3
SPRAY, NON-AEROSOL (ML) MUCOUS MEMBRANE AS NEEDED
Status: DISCONTINUED | OUTPATIENT
Start: 2020-01-01 | End: 2020-01-01

## 2020-01-01 RX ORDER — OMEPRAZOLE 20 MG/1
20 CAPSULE, DELAYED RELEASE ORAL
Qty: 180 CAPSULE | Refills: 0 | Status: SHIPPED | OUTPATIENT
Start: 2020-01-01

## 2020-01-01 RX ORDER — ONDANSETRON 2 MG/ML
4 INJECTION INTRAMUSCULAR; INTRAVENOUS EVERY 6 HOURS PRN
Status: DISCONTINUED | OUTPATIENT
Start: 2020-01-01 | End: 2020-01-01

## 2020-01-01 RX ORDER — SODIUM CHLORIDE 9 MG/ML
INJECTION, SOLUTION INTRAVENOUS CONTINUOUS
Status: DISCONTINUED | OUTPATIENT
Start: 2020-01-01 | End: 2020-01-01

## 2020-01-01 RX ORDER — SODIUM CHLORIDE 0.9 % (FLUSH) 0.9 %
10 SYRINGE (ML) INJECTION ONCE
Status: COMPLETED | OUTPATIENT
Start: 2020-01-01 | End: 2020-01-01

## 2020-01-01 RX ORDER — CAPECITABINE 150 MG/1
300 TABLET, FILM COATED ORAL DAILY
COMMUNITY

## 2020-01-01 RX ORDER — HYDROMORPHONE HYDROCHLORIDE 1 MG/ML
0.5 INJECTION, SOLUTION INTRAMUSCULAR; INTRAVENOUS; SUBCUTANEOUS ONCE
Status: COMPLETED | OUTPATIENT
Start: 2020-01-01 | End: 2020-01-01

## 2020-01-01 RX ORDER — SODIUM CHLORIDE 9 MG/ML
INJECTION, SOLUTION INTRAVENOUS ONCE
Status: DISCONTINUED | OUTPATIENT
Start: 2020-01-01 | End: 2020-01-01

## 2020-01-01 RX ORDER — FUROSEMIDE 20 MG/1
20 TABLET ORAL DAILY
Qty: 30 TABLET | Refills: 0 | Status: ON HOLD | OUTPATIENT
Start: 2020-01-01 | End: 2020-01-01

## 2020-01-01 RX ORDER — HYDROMORPHONE HYDROCHLORIDE 1 MG/ML
0.5 INJECTION, SOLUTION INTRAMUSCULAR; INTRAVENOUS; SUBCUTANEOUS
Status: DISCONTINUED | OUTPATIENT
Start: 2020-01-01 | End: 2020-01-01

## 2020-01-01 RX ORDER — OMEPRAZOLE 20 MG/1
20 CAPSULE, DELAYED RELEASE ORAL
Qty: 180 CAPSULE | Refills: 0 | Status: SHIPPED | OUTPATIENT
Start: 2020-01-01 | End: 2020-01-01

## 2020-01-01 RX ORDER — TRAMADOL HYDROCHLORIDE 50 MG/1
50 TABLET ORAL EVERY 6 HOURS PRN
Status: DISCONTINUED | OUTPATIENT
Start: 2020-01-01 | End: 2020-01-01

## 2020-01-01 RX ORDER — PANTOPRAZOLE SODIUM 40 MG/1
INJECTION, POWDER, FOR SOLUTION INTRAVENOUS
Status: COMPLETED
Start: 2020-01-01 | End: 2020-01-01

## 2020-01-01 RX ORDER — TRAMADOL HYDROCHLORIDE 50 MG/1
100 TABLET ORAL EVERY 6 HOURS PRN
Status: DISCONTINUED | OUTPATIENT
Start: 2020-01-01 | End: 2020-01-01

## 2020-01-01 RX ORDER — NALOXONE HYDROCHLORIDE 0.4 MG/ML
80 INJECTION, SOLUTION INTRAMUSCULAR; INTRAVENOUS; SUBCUTANEOUS AS NEEDED
Status: DISCONTINUED | OUTPATIENT
Start: 2020-01-01 | End: 2020-01-01

## 2020-01-01 RX ORDER — CIPROFLOXACIN 500 MG/1
500 TABLET, FILM COATED ORAL 2 TIMES DAILY
Qty: 6 TABLET | Refills: 0 | Status: SHIPPED | OUTPATIENT
Start: 2020-01-01 | End: 2020-01-01

## 2020-01-01 RX ORDER — PHYTONADIONE 10 MG/ML
10 INJECTION, EMULSION INTRAMUSCULAR; INTRAVENOUS; SUBCUTANEOUS ONCE
Status: DISCONTINUED | OUTPATIENT
Start: 2020-01-01 | End: 2020-01-01 | Stop reason: SDUPTHER

## 2020-01-01 RX ORDER — ALBUMIN (HUMAN) 12.5 G/50ML
25 SOLUTION INTRAVENOUS ONCE
Status: COMPLETED | OUTPATIENT
Start: 2020-01-01 | End: 2020-01-01

## 2020-01-01 RX ORDER — SODIUM CHLORIDE, SODIUM LACTATE, POTASSIUM CHLORIDE, CALCIUM CHLORIDE 600; 310; 30; 20 MG/100ML; MG/100ML; MG/100ML; MG/100ML
INJECTION, SOLUTION INTRAVENOUS CONTINUOUS
Status: DISCONTINUED | OUTPATIENT
Start: 2020-01-01 | End: 2020-01-01

## 2020-01-01 RX ORDER — TORSEMIDE 20 MG/1
20 TABLET ORAL DAILY
Status: DISCONTINUED | OUTPATIENT
Start: 2020-01-01 | End: 2020-01-01

## 2020-01-01 RX ORDER — LIDOCAINE HYDROCHLORIDE 10 MG/ML
INJECTION, SOLUTION EPIDURAL; INFILTRATION; INTRACAUDAL; PERINEURAL AS NEEDED
Status: DISCONTINUED | OUTPATIENT
Start: 2020-01-01 | End: 2020-01-01 | Stop reason: SURG

## 2020-01-01 RX ADMIN — SODIUM CHLORIDE 0.9 % (FLUSH) 10 ML: 0.9 % SYRINGE (ML) INJECTION at 14:15:00

## 2020-01-01 RX ADMIN — LIDOCAINE HYDROCHLORIDE 25 MG: 10 INJECTION, SOLUTION EPIDURAL; INFILTRATION; INTRACAUDAL; PERINEURAL at 09:44:00

## 2020-01-01 RX ADMIN — FLUOROURACIL 2750 MG: 50 INJECTION, SOLUTION INTRAVENOUS at 14:23:00

## 2020-01-01 RX ADMIN — SODIUM CHLORIDE: 9 INJECTION, SOLUTION INTRAVENOUS at 10:37:00

## 2020-01-02 NOTE — PROGRESS NOTES
Patient is here for MD f/u. She has completed one cycle of Lonsurf. Patient c/o feeling fatigued, brain fog and decrease appetite. She has been having fevers on occasion. Patient states she wants to discontinue Lonsurf and go back to IV chemo.        Aries Stewart

## 2020-01-02 NOTE — PROGRESS NOTES
Per Dr. Cherelle Mcfarland, no infusions needed today. Plan is to start oxaliplatin next week and see APN. Discussed with patient.

## 2020-01-03 NOTE — PROGRESS NOTES
Neurology H&P    Rafael Filter Patient Status:  No patient class for patient encounter    1945 MRN [id number]   Location [address] [place] [address], [address] [place] [address], [address] [place] [address] Attending No att. providers found   Hosp Day # 0 PCP Gloria Casas DO Other ascites     Hypoalbuminemia     Bilateral lower extremity edema     TIA (transient ischemic attack)     Metastatic colon cancer to liver (HCC)     Anemia of chronic disease     Transaminitis     Acute CVA (cerebrovascular accident) ([facility] [address]     Iron de normal bowel sounds  Skin: normal, dry  Extremities: No clubbing or cyanosis      Neurologic:   MENTAL STATUS: alert, ox3, normal attention, language and fund of knowledge.       CRANIAL NERVES II to XII: PERRLA, no ptosis or diplopia, EOM intact, facial se and is now taking ASA 81mg Qday alone.  She declines taking any larger dose of ASA than this as she reports that she may or may not have had rectal bleeding in the past on full dose ASA       Plan:  1. L Frontoparietal stroke - RUE weakness and numbness  -

## 2020-01-08 NOTE — PROGRESS NOTES
[facility]    PATIENT'S NAME: Charly Aden   ATTENDING PHYSICIAN: Elizabeth Zhong M.D.    PATIENT ACCOUNT #: [de-identified] LOCATION: [address] [place] [address] RECORD #: [id number] YOB: 1945   DATE OF SERVICE: 01/02/2020       CANCER TAHIR explanation to her that there is no evidence that this has any effect against her cancer. She is also thinking about going to [facility] for some other unconventional treatment.   Again, I have cautioned her that this has never been shown to be of significant the use of regorafenib. We will try the oxaliplatin-based therapy for a cycle or 2 to make sure she tolerates it, and if there is any evidence of benefit, we will proceed longer as long as the tolerance is not limited by peripheral neuropathy.   Questions

## 2020-01-09 NOTE — PROGRESS NOTES
ANP Visit Note    Patient Name: Viki Coleman   YOB: 1945   Medical Record Number: [id number]   CSN: [id number]   Date of visit: 1/9/2020   KIM EMIER DO   No primary care provider on file.      Chief Complaint/Reason for Visit:  Alma [place] Hemorrhoids    • Night sweats    • Personal history of antineoplastic chemotherapy 11/2018   • Rectal cancer [facility])    • TIA (transient ischemic attack) 08/2019   • Wears glasses        Surgical History:  Past Surgical History:   Procedure Laterality Date Physical activity:        Days per week: Not on file        Minutes per session: Not on file      Stress: Not on file    Relationships      Social connections:        Talks on phone: Not on file        Gets together: Not on file        Attends Rastafarian se Rfl:   •  ZINC OR, Use as directed 1 capsule in the mouth or throat daily. , Disp: , Rfl:   •  omega-3 fatty acids 1000 MG Oral Cap, Take 1,000 mg by mouth.  Daily or as directed , Disp: , Rfl:     [place]arc[place] in [place]     Review of Syst 3.4 - 5.0 g/dL    Globulin  5.8 (H) 2.8 - 4.4 g/dL    A/G Ratio 0.3 (L) 1.0 - 2.0    FASTING No    CBC W/ DIFFERENTIAL    Collection Time: 01/09/20 10:55 AM   Result Value Ref Range    WBC 11.5 (H) 4.0 - 11.0 x10(3) uL    RBC 3.35 (L) 3.80 - 5.30 x10(6)uL distress, coping difficulties and social support systems and currently there are no active problems.     Planned Follow Up: 2 weeks, labs, MD, Chemo      Risk Level: High: Metastatic CRC     Electronically Signed by:    Emmy Garcia ANP-BC  Nurse [address]

## 2020-01-20 NOTE — TELEPHONE ENCOUNTER
Patient is planning on going to [place] for treatment. She is flying on the 1/30 and will be there for 2 weeks for treatment. She will start treatment on 2/3. She will send the tentative plan to us.  She is asking for guidance regarding treatment this wesridevi

## 2020-01-20 NOTE — TELEPHONE ENCOUNTER
Rashaun Buitrago requesting a call back from Papa Ham regarding up coming tx due on Thursday, as she is going out to the country should she be getting it?

## 2020-01-22 NOTE — TELEPHONE ENCOUNTER
Ingrid Acevedo called stating she will not be in for treatment tomorrow, she had not heard back from Nell Leach and will be traveling. Call her if there are any questions or concerns.

## 2020-01-24 NOTE — PROGRESS NOTES
Patient is traveling to [place] for a few weeks. While there she is going to receive different types of treatment for her cancer. Information will be scanned in patient's chart. Patient to call when she returns to schedule MD f/u and chemo.

## 2020-03-02 NOTE — TELEPHONE ENCOUNTER
Back from [place], requesting appointment to review paperwork, left CB number to arrange appointment

## 2020-03-03 NOTE — PROGRESS NOTES
Called LVM to pt per request with time and location and instructions for paracentesis tomorrow at [place] 1pm for labs.

## 2020-03-03 NOTE — PROGRESS NOTES
ANP Visit Note    Patient Name: Priti Leon   YOB: 1945   Medical Record Number: [id number]   CSN: [id number]   Date of visit: 3/3/2020   KIM MEIER,    No primary care provider on file.      Chief Complaint/Reason for Visit:  Met blood loss       Medical History:  Past Medical History:   Diagnosis Date   • Abdominal hernia    • Anesthesia complication     Slow to wake up from anesthesia   • Belching    • Cancer [facility])    • Disorder of liver     Mets to liver   • Hemorrhoids    • Nig Smokeless tobacco: Never Used    Substance and Sexual Activity      Alcohol use: Yes        Comment: 2 per month red wine      Drug use: Yes        Types: Cannabis        Comment: medical      Sexual activity: Not Currently    Lifestyle      Physical activ MCG Oral Tab, Take 1 capsule by mouth daily. , Disp: , Rfl:   •  NON FORMULARY, Take by mouth nightly. Medical marijuana gummie 1 by mouth at bedtime to sleep, Disp: , Rfl:   •  Vitamin C 500 MG Oral Tab, Take 500 mg by mouth daily. , Disp: , Rfl:   •  Daniel Lynn Non- 95 >=60    GFR, -American 110 >=60     (H) 15 - 37 U/L    ALT 36 13 - 56 U/L    Alkaline Phosphatase 950 (H) 55 - 142 U/L    Bilirubin, Total 0.7 0.1 - 2.0 mg/dL    Total Protein 7.7 6.4 - 8.2 g/dL    Albumin 1.8 (L) 3.4 scanned into media for Dr Nikole Ibarra to review. Emotional Well Being:  I have assessed the patient's emotional well-being and any concerns about anxiety or depression.   We discussed issues of distress, coping difficulties and social support systems and c

## 2020-03-03 NOTE — PROGRESS NOTES
Outpatient Oncology Care Plan  Problem list:  pain  fatigue  knowledge deficit    Problems related to:    disease/disease progression    Interventions:  provided general teaching    Expected outcomes:  understands plan of care    Progress towards outcome:

## 2020-03-05 NOTE — PROGRESS NOTES
Patient is here for MD f/u for rectal cancer. Patient spent 4 weeks in [place] for chemo treatments. Patient states she had a total of 7 chemo treatments. Patient c/o abdominal distention and bloating and pelvic pain. No diarrhea or constipation.  No nausea

## 2020-03-10 NOTE — PROGRESS NOTES
[facility]    PATIENT'S NAME: Michelle Kosta   ATTENDING PHYSICIAN: Travon England M.D.    PATIENT ACCOUNT #: [de-identified] LOCATION: [address] [place] [address] RECORD #: [id number] YOB: 1945   DATE OF SERVICE: 03/05/2020       CANCER [facility] paracentesis, but there is very little fluid present. She is having reasonable bowel movements. In [place], they had removed 1800 mL. She did also apparently receive a dose of mitomycin in [place].   She is planning to try to return there in 3 months for is 66 65 76 here. Her alkaline phosphatase has gone from 616 in January to 950. Her albumin is only 1.8. Her AST and ALT are modestly elevated with an AST of 102. I checked her iron saturation and it is 12%. Her ferritin is high at 587.   Sodium is slightl

## 2020-03-16 NOTE — PROGRESS NOTES
[facility] Progress Note    Patient Name: Kishan Thomas   YOB: 1945   Medical Record Number: [id number]   CSN: [id number]   Date of visit: 3/16/2020   Provider: EMMANUEL Ibrahim  Referring Physician: No ref.  provider found    Probl Medications:    Current Outpatient Medications:   •  furosemide (LASIX) 20 MG Oral Tab, Take 1 tablet (20 mg total) by mouth daily. , Disp: 30 tablet, Rfl: 0  •  Potassium Chloride ER 10 MEQ Oral Tab CR, Take 1 tablet (10 mEq total) by mouth daily.  Take therapy    Abdominal distention:  She had US on 3/4 that did not show significant fluid amenable to aspiration. Pt states her abd is increasingly more distended. Will repeat US abd as it has been nearly 2 wks.   She states historically fluid accumulates q

## 2020-03-16 NOTE — PROGRESS NOTES
Patient presents with: Follow - Up: apn assessment    Patient c/o bilateral edema in ankle and feet the left more than the right-states her feet feel numb notice for about a week a so tried elevating her feet with no relief.  Also continued abdominal diste

## 2020-03-16 NOTE — TELEPHONE ENCOUNTER
Toxicities: Pt of Dr Brittney Cuba on 1/9/2020  She then traveled to [facility] and received tx there. She also had a paracentesis in [place].  Pt due for f/u with EMMANUEL Lizama tomorrow     Bilateral foot & Ankle Edema/Abdomi

## 2020-03-16 NOTE — TELEPHONE ENCOUNTER
Tabatha's left foot is swollen two times it's size. She wanted to know if she can have an ultrasound done of her foot to see if it is water build up. She says she can barely walk. She says this started a week ago.  She says it is very painful in both of her f

## 2020-03-17 PROBLEM — R18.0 MALIGNANT ASCITES: Status: ACTIVE | Noted: 2020-01-01

## 2020-03-17 PROBLEM — D64.9 ANEMIA, UNSPECIFIED TYPE: Status: ACTIVE | Noted: 2020-01-01

## 2020-03-17 PROBLEM — K92.2 UPPER GI BLEED: Status: ACTIVE | Noted: 2020-01-01

## 2020-03-17 PROBLEM — C20: Status: ACTIVE | Noted: 2020-01-01

## 2020-03-17 NOTE — CONSULTS
[facility]                       Gastroenterology [address] [facility] Gastroenterology    Doroteo Richard Patient Status:  Inpatient    1945 MRN [id number]   [facility] 4NW-A Attending Harjeet Celestin MD   [facility] Da Ferumoxytol             PAIN, SHORTNESS OF BREATH    Comment:Felt short of breath, like she was going to faint,             and severe back painOther reaction(s):  Other (See             Comments)           Felt short of breath, like she             was g SpO2 99%   BMI 21.25 kg/m²     Gen: AAO x 3, NAD     HENT: NCAT, EOMI, PERRL, oropharynx is clear with moist mucosal membranes    Eyes: Sclerae are anicteric    Neck:  Supple without nuchal rigidity;  No lymphadenopathy    CV: Regular rate and rhythm, with

## 2020-03-17 NOTE — ED PROVIDER NOTES
Patient Seen in: THE [facility] Emergency Department In [place]      History   Patient presents with:  GI Bleeding    Stated Complaint: gi bleed    HPI  75 yo female with colon ca with mets to liver presents for coffee ground emesis while at the cancer center negative. Positive for stated complaint: gi bleed  Other systems are as noted in HPI. Constitutional and vital signs reviewed. All other systems reviewed and negative except as noted above.     Physical Exam     ED Triage Vitals [03/17/20 1111] Behavior: Behavior normal.         Thought Content: Thought content normal.         Judgment: Judgment normal.         ED Course     Labs Reviewed   LIPASE - Normal   TYPE AND SCREEN    Narrative:      The following orders were created for panel order

## 2020-03-17 NOTE — PLAN OF CARE
Admission database completed except medication list. Daughter Seth Michelle will call with name of chemotherapy medication patient was prescribed in [place] and is currently taking.  Report Given to floor RN

## 2020-03-17 NOTE — PROGRESS NOTES
ANP Visit Note    Patient Name: Johnathan Lima   YOB: 1945   Medical Record Number: [id number]   CSN: [id number]   Date of visit: 3/17/2020   KIM MEIER,    No primary care provider on file.      Chief Complaint/Reason for Visit:  Me 08/2019   • Wears glasses        Surgical History:  Past Surgical History:   Procedure Laterality Date   • CHOLECYSTECTOMY     • COLONOSCOPY     • COLONOSCOPY     • ERCP,DIAGNOSTIC     • FLEXIBLE SIGMOIDOSCOPY N/A 12/11/2018    Performed by Chuck Monsalve file      Stress: Not on file    Relationships      Social connections:        Talks on phone: Not on file        Gets together: Not on file        Attends Buddhist service: Not on file        Active member of club or organization: Not on file        Atte Cannabis , Disp: , Rfl:   •  Multiple Vitamin (TAB-A-ROBLES) Oral Tab, Take 1 tablet by mouth daily. , Disp: , Rfl:   •  Cyanocobalamin (B-12) 100 MCG Oral Tab, Take 1 capsule by mouth daily. , Disp: , Rfl:   •  NON FORMULARY, Take by mouth nightly.  Medical

## 2020-03-17 NOTE — H&P
[facility] HOSPITAL[facility]                                                               History & Physical         Johnathan Lima Patient Status:  Inpatient    1945 MRN [id number]   [facility] 4NW-A Attending Tanya Estrada MD;[facility] extremities  History:  Past Medical History:   Diagnosis Date   • Abdominal hernia    • Belching    • Cancer [facility])    • Disorder of liver     Mets to liver   • Hemorrhoids    • Night sweats    • Personal history of antineoplastic chemotherapy 11/2018   • R tablet, Take 1 tablet (10 mg total) by mouth every 6 (six) hours as needed for Nausea., Disp: 30 tablet, Rfl: 3, Taking  aspirin 81 MG Oral Tab EC, Take 1 tablet (81 mg total) by mouth daily. , Disp: 30 tablet, Rfl: 2, Taking  NON FORMULARY, Take 2 capsules Ultrasound abdomen ordered    ASSESSMENT / PLAN:     1. GI bleed with Coffee-ground emesis   1. Patient started on Protonix drip from the emergency room which was continued  2. GI on consult  3. Follow hemoglobin to 8 hours  4. Transfuse as needed  5.  IV Z

## 2020-03-17 NOTE — CONSULTS
[facility]  Report of Consultation    Viki Coleman Patient Status:  Inpatient    1945 MRN [id number]   [facility] 4NW-A Attending Nathalie Emmanuel MD   Hosp Day # 0 PCP Alexandra Rowe DO     Reason for Consultation:  Ronni Longo right inguinal   • REMOVAL GALLBLADDER     • REPAIR ING HERNIA,5+Y/O,REDUCIBL       Family History   Problem Relation Age of Onset   • Heart Disease Father    • Prostate Cancer Father    • Cancer Mother    • Cancer Brother         pancreas      reports summer bowel sounds. Liver enlarged and firm. ? ascites. Not tense. Extremities: Pedal pulses are present. No edema and no tenderness. Neurological: Grossly intact. Lymphatics:  There is no palpable lymphadenopathy throughout in the  cervical, supraclavic Basophil Absolute 0.02 0.00 - 0.20 x10(3) uL    Immature Granulocyte Absolute 0.08 0.00 - 1.00 x10(3) uL    Neutrophil % 80.6 %    Lymphocyte % 10.3 %    Monocyte % 8.3 %    Eosinophil % 0.1 %    Basophil % 0.1 %    Immature Granulocyte % 0.6 %   PROTHROMB Expiration Date [id number]     Blood Type Barcode 5100        Imaging:  No new iamging.     Impression and Plan:    Patient Active Problem List:     Iron deficiency anemia     Rectal cancer ([facility]     Secondary carcinoma of liver ([facility]     Liver metasta

## 2020-03-17 NOTE — ED INITIAL ASSESSMENT (HPI)
PT SENT TO ED FROM CANCER CENTER WITH COFFEE GROUND EMESIS PT HAS COLON CA WITH METS TO LIVER. PT HAS HAD INCREASED SWELLING TO BILAT FEET AND INCREASED DISTENTION TO ABD.

## 2020-03-18 PROBLEM — Z71.89 GOALS OF CARE, COUNSELING/DISCUSSION: Status: ACTIVE | Noted: 2020-01-01

## 2020-03-18 PROBLEM — Z51.5 PALLIATIVE CARE ENCOUNTER: Status: ACTIVE | Noted: 2020-01-01

## 2020-03-18 NOTE — ANESTHESIA POSTPROCEDURE EVALUATION
[facility] Patient Status:  Inpatient   Age/Gender 76year old female MRN [id number]   Location [address] [place] [address]. Attending Chi Chang MD   Hosp Day # 1 PCP KIM MEIER,        Anesthesia Post-op Note    Procedur

## 2020-03-18 NOTE — PHYSICAL THERAPY NOTE
PT order received, chart reviewed. Pt has been GÓMEZ for extended period this AM for EGD. Will f/u for PT eval when appropriate.

## 2020-03-18 NOTE — PLAN OF CARE
Assumed care of pt at [address]. Pt denies nausea no emesis noted. Pt had a small formed brown bm no blood noted. Ambulated to bathroom with assistance. Pt c/o pain to abdomen but refusing tylenol. MD notified for ultram order awaiting pharmacy to profile.  Requ

## 2020-03-18 NOTE — ANESTHESIA PREPROCEDURE EVALUATION
PRE-OP EVALUATION    Patient Name: Michelle Meade    Pre-op Diagnosis: INPT    Procedure(s):  ESOPHAGOGASTRODUODENOSCOPY    Surgeon(s) and Role:     Sherita Baker MD - Primary    Pre-op vitals reviewed.   Temp: 98.4 °F (36.9 °C)  Pulse: 105  Resp: 1 mg tablet, Take 1 tablet (10 mg total) by mouth every 6 (six) hours as needed for Nausea., Disp: 30 tablet, Rfl: 3  aspirin 81 MG Oral Tab EC, Take 1 tablet (81 mg total) by mouth daily. , Disp: 30 tablet, Rfl: 2  NON FORMULARY, Take 2 capsules by mouth caden very long      Drug use:    Types: Cannabis   Comment: medical     Available pre-op labs reviewed.   Lab Results   Component Value Date    WBC 13.8 (H) 03/17/2020    RBC 2.87 (L) 03/17/2020    HGB 7.4 (L) 03/17/2020    HCT 24.8 (L) 03/17/2020    MCV 86.4 03 ([facility]

## 2020-03-18 NOTE — PLAN OF CARE
Admitted this 77y/o female patient from [place] ED. Patient was at the cancer center & had coffee ground emesis so she was taken to the ED. Patient has been having progressive swelling of the legs,noted both legs with pitting edema.  Abdomen is round,soft

## 2020-03-18 NOTE — PROGRESS NOTES
Palliative Care  Palliative Care Consult request from Dr. Kelsey Foss noted requesting discussion for goals of care. He requested that I reach out to the patient's daughter prior to my visit.  Earlier today I spoke to Hinkley Cordial by phone and discussed the role of

## 2020-03-18 NOTE — PLAN OF CARE
2330 received patient in handoff. Second unit of blood transfusing . Tolerating well. Still c/o abdominal and back pain. Given second dose of Ultram 50 mg. Is forgetful. NPO after 2400 for EGD and US of abdomen. Will obtain consent in am as is samy from pa

## 2020-03-18 NOTE — CONSULTS
[address] [facility]  [id number]  Hospital Day #2  Date of Consult: 3/19/2020       Reason for Consultation:      Consult requested by Dr. Maeve Barba for evaluation of palliative care needs and goals of c • Stroke ([facility] 09/11/2019    TIA    • TIA (transient ischemic attack) 08/2019   • Wears glasses      Past Surgical History:   Procedure Laterality Date   • CHOLECYSTECTOMY     • COLONOSCOPY     • COLONOSCOPY     • ERCP,DIAGNOSTIC     • FLEXIBLE SIGMOIDO in Sodium Chloride 0.9 % 10 mL IV push, 40 mg, Intravenous, BID  Octreotide Acetate (sandoSTATIN) 500 mcg in sodium chloride 0.9% 100 mL infusion, 50 mcg/hr, Intravenous, Continuous  ondansetron HCl (ZOFRAN) injection 4 mg, 4 mg, Intravenous, Q6H PRN  0.9% time    Psychiatric: Mood; frustrated with daughter when she \"isn't listening to me\"  Skin: pale, warm and dry.     Palliative Performance Scale :  60%    Palliative Performance Scale   % Ambulation Activity Level Self-Care Intake Consciousness   100 Full stating that she had her \"\" to help her and her daughters as well. Prognostic awareness/understanding: Nba Mauricio is aware of her metastatic cancer but has poor insight into the serious implications of her advanced disease.  She is choosing to do wh to die.      Advance Care Planning counseling and discussion:  Patient's preference about sharing medical information: OK to share with daughters and her life partner  Patient's decision making preferences: self    POLST/CODE STATUS: We discussed the risks Other ascites     Hypoalbuminemia     Bilateral lower extremity edema     TIA (transient ischemic attack)     Metastatic colon cancer to liver (HCC)     Anemia of chronic disease     Transaminitis     Acute CVA (cerebrovascular accident) ([facility]     Iron def Care Team to participate in the care of your patient. As goals of care have been established, Palliative Care will sign off. Please re-consult if clinical condition changes or per request of patient or family. Above plan reviewed with Alfred Cortez

## 2020-03-18 NOTE — PROGRESS NOTES
[facility]PITAL[facility]  Progress Note     Antonina Dean Patient Status:  Inpatient    1945 MRN [id number]   [facility] 4NW-A Attending Michelle Dukes MD   Hosp Day # 1 PCP Lorenza Leblanc DO     Chief Complaint: coffee ground emesis today.  Discussed with family. Likely proceed with hospice level of care. I spent an extra 35 minutes face to face with the patient. More than 50% of that time was spent counseling the patient and/or on coordination of care.  The diagnosis, prognosis, a

## 2020-03-18 NOTE — PROGRESS NOTES
Heme/Onc Progress Note    Patient Name: Jennifer Riggs   YOB: 1945   Medical Record Number: [id number]   CSN: [id number]   Attending Physician: Elizabeth Zhong M.D. Subjective:  Tough night.  Some pain the in the back and abdomen overnig edema. Neurological: Grossly intact. Lymphatics: There is no palpable lymphadenopathy throughout in the cervical, supraclavicular, axillary, or inguinal regions.   Psych/Depression: anxious    Labs:  Recent Results (from the past 24 hour(s))   CEA    Col 0. 08 0.00 - 1.00 x10(3) uL    Neutrophil % 80.6 %    Lymphocyte % 10.3 %    Monocyte % 8.3 %    Eosinophil % 0.1 %    Basophil % 0.1 %    Immature Granulocyte % 0.6 %   PROTHROMBIN TIME (PT)    Collection Time: 03/17/20 10:50 AM   Result Value Ref Range Collection Time: 03/18/20  4:02 AM   Result Value Ref Range    Glucose 120 (H) 70 - 99 mg/dL    Sodium 140 136 - 145 mmol/L    Potassium 4.7 3.5 - 5.1 mmol/L    Chloride 111 98 - 112 mmol/L    CO2 22.0 21.0 - 32.0 mmol/L    Anion Gap 7 0 - 18 mmol/L    BUN source unclear but likely due to gastritis though could have some varices given her liver function. Rreceived fluids and blood. Hgb better. On PPI. EGD planned today.    2)  Advanced metastatic colon cancer with extensive liver mets.   No reasonable opt

## 2020-03-18 NOTE — OPERATIVE REPORT
Operative Report-Esophagogastroduodenoscopy      PREOPERATIVE DIAGNOSIS/INDICATION: Hematemesis    POSTOPERTATIVE DIAGNOSIS: Portal hypertensive gastropathy, EV    PROCEDURE PERFORMED: EGD    INFORMED CONSENT: Once a brief history THERAPEUTICS: Biopsies were performed.     RECOMMENDATIONS:     - Post EGD precautions, watch for bleeding, infection, perforation, adverse drug reactions     - Follow biopsies.    - Pantoprazole 40 mg BID, ceftriaxone/cipro for 5 days, octreotide gtt f

## 2020-03-19 NOTE — PROGRESS NOTES
Heme/Onc Progress Note    Patient Name: Carole Quiñonez   YOB: 1945   Medical Record Number: [id number]   CSN: [id number]       Subjective: NAEO. Meeting again with palliative today. Has decided on palliative care but not hospice.  She wants to clear.   Neck: No JVD. No palpable lymphadenopathy. Neck is supple. Chest: Clear to auscultation. Heart: Regular rate and rhythm. Abdomen: Distended - firm, enlarged liver. Extremities: Pedal pulses are present. 1-2+ edema.   Neurological: Grossly Guinea may benefit from paracentesis. Discussed with RN. Gi following.      [address] [facility]

## 2020-03-19 NOTE — PLAN OF CARE
Patient received alert and oriented, ambulating to the bathroom with stby assistance. Having loose stool and voiding with no complaints of dysuria. Prn pain medication given at hs, patient slept uneventfully throughout the night.  She is notably tearful, response  - Implement non-pharmacological measures as appropriate and evaluate response  - Consider cultural and social influences on pain and pain management  - Manage/alleviate anxiety  - Utilize distraction and/or relaxation techniques  - Monitor for op

## 2020-03-19 NOTE — PROGRESS NOTES
[facility] Gastroenterology Progress Note    S: Pt with no recurrent hematemesis at this time. Worsened abd distention at this time.      O: BP 95/55 (BP Location: Left arm)   Pulse 105   Temp 98 °F (36.7 °C) (Oral)   Resp 18   Wt 119 lb 14.9 oz (54.4

## 2020-03-19 NOTE — PLAN OF CARE
Assumed care of patient at approx 0730 this morning. Patient alert and oriented; slightly drowsy at times. Vital signs stable, patient tolerating clear liquid diet with no c/o nausea/vomiting.  Patient has no c/o pain this shift; up to bathroom with standby

## 2020-03-19 NOTE — PROGRESS NOTES
[facility]  Progress Note     America Musa Patient Status:  Inpatient    1945 MRN [id number]   [facility] 4NW-A Attending Jay Callaway MD   Hosp Day # 2 PCP Edmar Naqvi DO     Chief Complaint: coffee ground emesis hypertension gastropathy, non bleeding EV s/p banding w/ incomplete eradication, clot in body of stomach s/p removal  2. Cont. PPI BID  3. Rocephin/cipro x 5 days  4. Octreotide through 3/20  5. CLD, advance slowly  6. Repeat EGD in 3 weeks  2.  Acute blood

## 2020-03-19 NOTE — PLAN OF CARE
Pt has been asleep on/off since she came back from GI lab s/p EGD. VS stable, tolerating some clear liquids well. Up to bathroom with assistance, had soft bowel movement.

## 2020-03-19 NOTE — CM/SW NOTE
EDWIN reviewed [facility] note who stated the pt is agreeable to [facility] [place]. She indicated the pt did not want any other services as her  helps her. EDWIN paged [facility] w/PC order. Asked them to see the pt in regards to this.

## 2020-03-20 NOTE — PROGRESS NOTES
[facility] Gastroenterology Progress Note    S: Pt with no recurrent hematemesis at this time. Worsened abd distention at this time. O: /78 (BP Location: Left arm)   Pulse 99   Temp 97.5 °F (36.4 °C) (Oral)   Resp 16   Wt 119 lb 14.9 oz (54.

## 2020-03-20 NOTE — DIETARY NOTE
[address] ASSESSMENT    Pt does not meet malnutrition criteria at this time.     NUTRITION DIAGNOSIS/PROBLEM:    Increased nutrient needs related to physiologic causes increasing nutrient needs as evidenced by metastatic rectal CA oz)    NUTRITION:  Diet: Low Fiber/ Soft  Oral Supplements: Ensure Enlive at breakfast    FOOD/NUTRITION RELATED HISTORY:  Appetite: unable to assess  Intake: no PO intakes recorded  Intake Meeting Needs: added ONS to help maximize PO intakes  Food Allergi

## 2020-03-20 NOTE — PROGRESS NOTES
[facility]  Progress Note     Doroteo Richard Patient Status:  Inpatient    1945 MRN [id number]   [facility] 4NW-A Attending Sahara Junior MD   Hosp Day # 3 PCP Ramsey Hoover DO     Chief Complaint: coffee ground emesis PLAN:     1. UGIB  1. EGD 3/18: portal hypertension gastropathy, non bleeding EV s/p banding w/ incomplete eradication, clot in body of stomach s/p removal  2. Cont. PPI BID  3. Rocephin/cipro x 5 days  4. Octreotide through 3/20  5.  CLD, advance slowly  6

## 2020-03-20 NOTE — PHYSICAL THERAPY NOTE
PHYSICAL THERAPY QUICK EVALUATION - INPATIENT    Room Number: 429/429-A  Evaluation Date: 3/20/2020  Presenting Problem: Upper GI bleed  Physician Order: PT Eval and Treat    Problem List  Principal Problem:    Upper GI bleed  Active Problems:    Rectal fall risk    WEIGHT BEARING RESTRICTION                   PAIN ASSESSMENT  Rating: Unable to rate         RANGE OF MOTION AND STRENGTH ASSESSMENT    Lower extremity ROM is within functional limits     Lower extremity strength is within functional limits 3/17/2020 for Upper GI bleed. Pertinent comorbidities and personal factors impacting therapy include metastatic cancer. Functional outcome measures completed include AMPAC.   Based on this evaluation, patient's clinical presentation is evolving and overal

## 2020-03-20 NOTE — PLAN OF CARE
Assumed care of patient at 1300. She has been asleep, awakes with voice, denies pain or shortness of breath. Up to bathroom with assistance, appears weak. On octreotide drip until tomorrow at 10AM. Encouraged oral intake.

## 2020-03-20 NOTE — PLAN OF CARE
Patient received alert, oriented, very anxious regarding pain and inability to get comfortable. Patient assisted up to chair, bathroom, walked around room, prn pain medication given.     Upon reassessment she is noted to be asleep with no further episod

## 2020-03-20 NOTE — PROGRESS NOTES
Heme/Onc Progress Note    Patient Name: Debora Peters   YOB: 1945   Medical Record Number: [id number]   CSN: [id number]       Subjective: NAEO. Paracentesis attempted, but unable to perform d/t lack of ascites. Hb is stable.  Complaining of a edema. Neurological: Grossly intact. Lymphatics: There is no palpable lymphadenopathy throughout in the cervical, supraclavicular, axillary, or inguinal regions.   Psych/Depression: anxious    Labs:  Recent Results (from the past 24 hour(s))   HEMOGLOBIN

## 2020-03-21 NOTE — HOME CARE LIAISON
[address] [facility] PROCESSED REFERRAL     THANKS  Tommy Jeffrey

## 2020-03-21 NOTE — PLAN OF CARE
Problem: GASTROINTESTINAL - ADULT  Goal: Minimal or absence of nausea and vomiting  Description  INTERVENTIONS:  - Maintain adequate hydration with IV or PO as ordered and tolerated  - Nasogastric tube to low intermittent suction as ordered  - Evaluate e Order received for NPO after MN and US guided paracentesis tomorrow. D/w pt and pt's POA/ Significant other. Pt. Reports some discomfort w/ bending at the waist and states she thinks she needs paracentesis.  Small soft green colored BM this PM.

## 2020-03-21 NOTE — PROGRESS NOTES
Gastroenterology Progress Note  Patient Name: Mimi Thomas  Chief Complaint: hematemesis  S: Pt has not has any further signs of GI bleeding. She denies abdominal pain (aside from baseline). She has not had a BM today.    O: /71 (BP Location: Left a

## 2020-03-21 NOTE — PROGRESS NOTES
[facility]  Progress Note     Michelle Meade Patient Status:  Inpatient    1945 MRN [id number]   [facility] 4NW-A Attending Mic Cerda MD   Hosp Day # 5 [address],      Chief Complaint: abd distension    S: IV push  40 mg Intravenous BID       ASSESSMENT / PLAN:     1. UGIB  1. EGD 3/18: portal hypertension gastropathy, non bleeding EV s/p banding w/ incomplete eradication, clot in body of stomach s/p removal  2. Cont. PPI BID  3. Rocephin/cipro x 5 days  4.

## 2020-03-21 NOTE — PLAN OF CARE
Pt alert and oriented x3 but forgetful. VSS and afebrile. Continues on Octreotide drip. Port changed this evening. No reports of nausea at this time. Up with standby assist, free from falls and injury. Call light within reach.   Will continue to monit

## 2020-03-22 NOTE — PROGRESS NOTES
Gastroenterology Progress Note  Patient Name: Michelle Meade  Chief Complaint: hematemesis  S: Pt denies BM. She denies abdominal pain. She feels less distended after paracentesis. O: BP 97/61   Pulse 98   Temp 98.1 °F (36.7 °C)   Resp 18   Wt 119 lb 14.

## 2020-03-22 NOTE — PLAN OF CARE
Problem: GASTROINTESTINAL - ADULT  Goal: Minimal or absence of nausea and vomiting  Description  INTERVENTIONS:  - Maintain adequate hydration with IV or PO as ordered and tolerated  - Nasogastric tube to low intermittent suction as ordered  - Evaluate e clearing pt for DC. Pt. To follow up w/ repeat EGD in 3 wks. D/w pt and family. Pt. Selena Vazquez understanding. 2 copies of pt's DC paperwork given to pt for pt and also pt's dtr. [id number] Pt's Port-a-cath flushed per protocol. IV site to right forearm dc'd.  Sma

## 2020-03-22 NOTE — PLAN OF CARE
Pt alert and oriented x4. VSS and afebrile. Pt with c/o SOB with exertion and pain to abd. PRN pain medication given with moderate relief and Pt was able to rest comfortably through the night. Pt reports abd pain only with movement and sitting upright.

## 2020-03-23 NOTE — TELEPHONE ENCOUNTER
LMTCB. Provider would like to convert patient's OV visit on 04/20/2020 to a Telephone/Virtual visit on alternate day as that is provider's day in the office. Please consult phone consult schedule prior to scheduling.

## 2020-03-23 NOTE — DISCHARGE SUMMARY
[facility] [place] HOSPITALIST  DISCHARGE SUMMARY     Fadi Johnston Patient Status:  Inpatient    1945 MRN [id number]   [facility] 4NW-A Attending No att. providers found   Hosp Day # 5 PCP KIM MEIER DO     Date of Admission: 3/17/2020 admitted to [facility]  Patient had a bilateral lower extremity venous duplex ordered by oncologist yesterday which was negative for any DVT in bilateral lower extremities    Brief Synopsis:     The patient was diagnosed with an upper GI bleed.   She started on capsule by mouth daily. Refills:  0     capecitabine 150 MG tab  Commonly known as:  XELODA      Take 300 mg by mouth daily   Refills:  0     NON FORMULARY      Take 2 capsules by mouth daily.  Cannabis   Refills:  0     NON FORMULARY      Take by mouth n appointment as soon as possible for a visit in 1 week      Madi Fisher, [address] [facility] [address]  [address] [id number]    In 3 weeks  pt needs e visit; need to discuss if repeat EGD should be done    Appointments for Next 30 Days 3/23/2020 - or similar blood thinners, please call the ordering physician to see if that medication can be held for 7 days prior to the procedure. It is important for Radiology to be notified if you are not able to hold any of the above medications.  For [facility] physician's office submitted the order electronically or faxed the order. Without the order, your test may be delayed or postponed.     Children: Children under the age of 15 must have another adult caregiver with them.  Please do not bring your child/child

## 2020-03-24 NOTE — TELEPHONE ENCOUNTER
Pt scheduled telephone consult for 4/20 at 120 pm.       Richy Bailey verbally consents to a Virtual/Telephone Check-In service on 04/20/20.   Patient understands and accepts financial responsibility for any deductible, co-insurance and/or co-pays associa

## 2020-03-26 NOTE — TELEPHONE ENCOUNTER
JULIA Duffy from [facility] called to let MD know pt might possibly be passing in about a week and refusing hospice. Per Chadwick Washington she has giving the pt's family member Ativan and Morphine for comfort at this time.        Just sending as an FYI per

## 2020-03-27 NOTE — TELEPHONE ENCOUNTER
I spoke with the patient's daughter Freddie Henry. I offered any services that I could to help with her mom. I explained to her that if she needed anything at all to please send me a message or call the office.   Right now, she feels that her mom is at least m

## 2020-03-30 NOTE — PROGRESS NOTES
[facility]    PATIENT'S NAME: Charly Aden   ATTENDING PHYSICIAN: Elizabeth Zhong M.D.    PATIENT ACCOUNT #: [de-identified] LOCATION: [address] [place] [address] RECORD #: [id number] YOB: 1945   DATE OF SERVICE: 08/13/2019       CANCER [facility] hepatosplenomegaly or tenderness. EXTREMITIES:  She has no clubbing, cyanosis, or edema. NEUROLOGIC:  She is intact. LABORATORY DATA:  Her BUN is 9, creatinine 0.5. Her alkaline phosphatase is slightly low at 376.   Her CEA is slightly higher at 50

## 2020-04-03 NOTE — TELEPHONE ENCOUNTER
Received page from [facility]- reports in the past 1-1.5 weeks patient has developed mottling on her bilateral knees, confusion and difficulty walking. Hx of rectal ca with mets to liver.  Daughter has chosen to invoke her POA and would like

## 2021-11-02 NOTE — PROGRESS NOTES
3 Pt here for 2 week MD f/u and due for chemo. Energy level has been great, has been very active. Appetite has been good, has gained weight. Denies pain, except when she eats something she shouldn't. Denies bowel issues, no nausea.  Pt had no neuropathy issue

## 2023-05-19 NOTE — LETTER
Printed: 2018    Patient Name: Viki Coleman  : 1945   Medical Record #: [id number]    Consent to Cancer Treatment    I, Viki Coleman, understand that I have been diagnosed with colon cancer (stage IV).     I understand that the treatmen "Per Dr LUCI Mahoney spoke with Ms Coelho, " per the pictures she sent of her leg,it appears to be an abcess and she need to come in to clinic today to get it drained.." Ms Coelho states," I'm coming, it will take me about an hour to get there." Dr LUCI Mahoney notified.  " questions have been answered to my satisfaction. I understand that I can contact my oncologist, Shon Paul MD, or my Cancer Care Team at any time if I have questions, by calling [facility] at [phone].    Additional written information will

## 2023-10-10 NOTE — PROGRESS NOTES
Gabapentin  600MG / Tablet  Rx# [id number] Other Qty: 56  Days: 28  Refills: 0 Prescribed: 9/20/2023  Dispensed: 9/25/2023  Sold: 9/26/2023        Patient is here for APN assessment and cycle 10 of chemo. Patient c/o intermittent pelvic cramping and frequent loose stools. Some of this is diet related. Appetite is good. Patient states she is going back on her veggie diet and no meat.        Education R

## (undated) DEVICE — 1200CC GUARDIAN II: Brand: GUARDIAN

## (undated) DEVICE — FILTERLINE NASAL ADULT O2/CO2

## (undated) DEVICE — Device: Brand: DEFENDO AIR/WATER/SUCTION AND BIOPSY VALVE

## (undated) DEVICE — ENDOSCOPY PACK - LOWER: Brand: MEDLINE INDUSTRIES, INC.

## (undated) DEVICE — Device

## (undated) DEVICE — ROTH NET FOOD BOLUS

## (undated) DEVICE — 3M™ RED DOT™ MONITORING ELECTRODE WITH FOAM TAPE AND STICKY GEL, 50/BAG, 20/CASE, 72/PLT 2570: Brand: RED DOT™

## (undated) DEVICE — ENDOSCOPY PACK UPPER: Brand: MEDLINE INDUSTRIES, INC.

## (undated) DEVICE — SPEEDBAND SUPERVIEW SUPER 7

## (undated) NOTE — ED AVS SNAPSHOT
Jennifer Riggs   MRN: [id number]    Department:  [facility] Emergency Department   Date of Visit:  9/24/2019           Disclosure     Insurance plans vary and the physician(s) referred by the ER may not be covered by your plan.  Please contact your tell this physician (or your personal doctor if your instructions are to return to your personal doctor) about any new or lasting problems. The primary care or specialist physician will see patients referred from the [facility] Emergency Department.  Henry Springer

## (undated) NOTE — Clinical Note
You're seeing her for scheudled visit/labs. I just patched her up - wesley if she will have a clot but gave her Lasix bec she was miserable.   US abd - probably won't show muh but per her dtr, has been known to accumulate fast.

## (undated) NOTE — LETTER
Printed: 2019    Patient Name: Viki Coleman  : 1945   Medical Record #: [id number]    Consent to Cancer Treatment    I, Viki Coleman, understand that I have been diagnosed with Colorectal Cancer (stage IV).     I understand that the tr contact my oncologist, Dr David Rascon or my Cancer Care Team at any time if I have questions, by calling [facility] at [phone]. Additional written information will be given to me prior to start of therapy.     Additionally, I will receive a copy

## (undated) NOTE — LETTER
[facility] [address], [address]  Consent for Procedure/Sedation    Date:     Time:       1. I authorize the performance upon Aries Sandovalusing the following:  VENOUS ACCESS PORT IMPLANT     2.  I authorize Dr. aJnneth Hsu (and whomever is de ________________________________    ___________________    Witness: _________________________      Date: ___________________    Printed: 2018   1:32 PM  Patient Name: Justin Dharmesh        : 1945       Medical Record #: [id number]

## (undated) NOTE — LETTER
Bella Castaneda [address] [place] [address] E  [place], [address] [facility]    Consent for Operation  Date: ______03/18/2020____________                                Time: _______________    1.  I authorize the performance upon Antonina Dean the following operation: procedure has been videotaped, the surgeon will obtain the original videotape. The hospital will not be responsible for storage or maintenance of this tape.   7. For the purpose of advancing medical education, I consent to the admittance of observers to the STATEMENTS REQUIRING INSERTION OR COMPLETION WERE FILLED IN.     Signature of Patient:   ___________________________    When the patient is a minor or mentally incompetent to give consent:  Signature of person authorized to consent for patient: ____________ supplements, and pills I can buy without a prescription (including street drugs/illegal medications). Failure to inform my anesthesiologist about these medicines may increase my risk of anesthetic complications. iv.  If I am allergic to anything or have ha Anesthesiologist Signature     Date   Time  I have discussed the procedure and information above with the patient (or patient’s representative) and answered their questions. The patient or their representative has agreed to have anesthesia services.     ___

## (undated) NOTE — LETTER
[address] [facility] FOR BLOOD OR BLOOD COMPONENTS      In the course of your treatment, it may become necessary to administer a transfusion of blood or blood components.  This form provides basic information concerning this proc explain the alternatives to you if it has not already been done. I,Jaz Rolle, have read/had read to me the above. I understand the matters bearing on the decision whether or not to authorize a transfusion of blood or blood components.  I have no quest

## (undated) NOTE — ED AVS SNAPSHOT
Priti Leon   MRN: [id number]    Department:  [facility] Department in [place]   Date of Visit:  12/16/2019           Disclosure     Insurance plans vary and the physician(s) referred by the ER may not be covered by your plan.  Please contac tell this physician (or your personal doctor if your instructions are to return to your personal doctor) about any new or lasting problems. The primary care or specialist physician will see patients referred from the [facility] Emergency Department.  Salvadore Skiff